# Patient Record
Sex: FEMALE | Race: ASIAN | Employment: FULL TIME | URBAN - METROPOLITAN AREA
[De-identification: names, ages, dates, MRNs, and addresses within clinical notes are randomized per-mention and may not be internally consistent; named-entity substitution may affect disease eponyms.]

---

## 2018-05-09 ENCOUNTER — OFFICE VISIT (OUTPATIENT)
Dept: FAMILY MEDICINE CLINIC | Age: 53
End: 2018-05-09

## 2018-05-09 VITALS
HEIGHT: 63 IN | DIASTOLIC BLOOD PRESSURE: 63 MMHG | WEIGHT: 140.2 LBS | TEMPERATURE: 98.3 F | BODY MASS INDEX: 24.84 KG/M2 | HEART RATE: 67 BPM | RESPIRATION RATE: 16 BRPM | SYSTOLIC BLOOD PRESSURE: 103 MMHG | OXYGEN SATURATION: 100 %

## 2018-05-09 DIAGNOSIS — T73.2XXA FATIGUE DUE TO EXPOSURE, INITIAL ENCOUNTER: ICD-10-CM

## 2018-05-09 DIAGNOSIS — Z12.11 COLON CANCER SCREENING: ICD-10-CM

## 2018-05-09 DIAGNOSIS — O24.410 DIET CONTROLLED GESTATIONAL DIABETES MELLITUS (GDM) IN SECOND TRIMESTER: ICD-10-CM

## 2018-05-09 DIAGNOSIS — E55.9 HYPOVITAMINOSIS D: ICD-10-CM

## 2018-05-09 DIAGNOSIS — Z13.220 SCREENING CHOLESTEROL LEVEL: ICD-10-CM

## 2018-05-09 DIAGNOSIS — Z00.00 ENCOUNTER FOR ANNUAL PHYSICAL EXAM: Primary | ICD-10-CM

## 2018-05-09 DIAGNOSIS — Z84.81 FAMILY HISTORY OF BREAST CANCER GENE MUTATION IN FIRST DEGREE RELATIVE: ICD-10-CM

## 2018-05-09 NOTE — PATIENT INSTRUCTIONS

## 2018-05-09 NOTE — MR AVS SNAPSHOT
[address] [facility] [address] [facility] 
[phone] Patient: Teri Lerma MRN: [id number] :1965 Visit Information Date & Time Provider Department Dept. Phone Encounter #  
 2018 12:00 PM Tim Carrero MD [facility] at [address] [id number] Follow-up Instructions Return in about 1 year (around 2019), or if symptoms worsen or fail to improve, for routine follow up. Upcoming Health Maintenance Date Due COLONOSCOPY 1982 DTaP/Tdap/Td series (1 - Tdap) 1985 PAP AKA CERVICAL CYTOLOGY 1985 Influenza Age 5 to Adult 2018 BREAST CANCER SCRN MAMMOGRAM 2020 Allergies as of 2018  Review Complete On: 2018 By: Tim Carrero MD  
 No Known Allergies Current Immunizations  Reviewed on 2014 No immunizations on file. Not reviewed this visit You Were Diagnosed With   
  
 Codes Comments Encounter for annual physical exam    -  Primary ICD-10-CM: Z00.00 ICD-9-CM: V70.0 Screening cholesterol level     ICD-10-CM: A70.646 ICD-9-CM: V77.91 Fatigue due to exposure, initial encounter     ICD-10-CM: T73. 2XXA ICD-9-CM: 994.4 Diet controlled gestational diabetes mellitus (GDM) in second trimester     ICD-10-CM: O24.410 ICD-9-CM: 892.28 Hypovitaminosis D     ICD-10-CM: E55.9 ICD-9-CM: 268.9 Colon cancer screening     ICD-10-CM: Z12.11 ICD-9-CM: V76.51 Family history of breast cancer gene mutation in first degree relative     ICD-10-CM: Z84.81 ICD-9-CM: V18.9 Vitals BP Pulse Temp Resp Height(growth percentile) Weight(growth percentile) 103/63 (BP 1 Location: Left arm, BP Patient Position: Sitting) 67 98.3 °F (36.8 °C) (Oral) 16 5' 2.75\" (1.594 m) 140 lb 3.2 oz (63.6 kg) SpO2 BMI OB Status Smoking Status 100% 25.03 kg/m2 Having regular periods Never Smoker BMI and BSA Data Body Mass Index Body Surface Area 25.03 kg/m 2 1.68 m 2 Preferred Pharmacy Pharmacy Name Phone [id number] [place], [address] [facility] [phone] Your Updated Medication List  
  
   
This list is accurate as of 5/9/18 12:50 PM.  Always use your most recent med list.  
  
  
  
  
 calcium-vitamin D 500 mg(1,250mg) -200 unit per tablet Commonly known as:  OYSTER SHELL Take 1 Tab by mouth daily. therapeutic multivitamin tablet Commonly known as:  [facility] Take 1 Tab by mouth daily. We Performed the Following BRCA1 TARGETED ANALYSIS [[id number] Custom] BRCA2 TARGETED ANALYSIS [[id number] Custom] CBC WITH AUTOMATED DIFF [44066 CPT(R)] HEMOGLOBIN A1C WITH EAG [51071 CPT(R)] LIPID PANEL [03305 CPT(R)] METABOLIC PANEL, COMPREHENSIVE [90200 CPT(R)] OCCULT BLOOD, IMMUNOASSAY (FIT) [id number] CPT(R)] TSH 3RD GENERATION [83172 CPT(R)] VITAMIN D, 25 HYDROXY [id number] CPT(R)] Follow-up Instructions Return in about 1 year (around 5/9/2019), or if symptoms worsen or fail to improve, for routine follow up. Patient Instructions Well Visit, Ages 25 to 48: Care Instructions Your Care Instructions Physical exams can help you stay healthy. Your doctor has checked your overall health and may have suggested ways to take good care of yourself. He or she also may have recommended tests. At home, you can help prevent illness with healthy eating, regular exercise, and other steps. Follow-up care is a key part of your treatment and safety. Be sure to make and go to all appointments, and call your doctor if you are having problems. It's also a good idea to know your test results and keep a list of the medicines you take. How can you care for yourself at home? · Reach and stay at a healthy weight.  This will lower your risk for many problems, such as obesity, diabetes, heart disease, and high blood pressure. · Get at least 30 minutes of physical activity on most days of the week. Walking is a good choice. You also may want to do other activities, such as running, swimming, cycling, or playing tennis or team sports. Discuss any changes in your exercise program with your doctor. · Do not smoke or allow others to smoke around you. If you need help quitting, talk to your doctor about stop-smoking programs and medicines. These can increase your chances of quitting for good. · Talk to your doctor about whether you have any risk factors for sexually transmitted infections (STIs). Having one sex partner (who does not have STIs and does not have sex with anyone else) is a good way to avoid these infections. · Use birth control if you do not want to have children at this time. Talk with your doctor about the choices available and what might be best for you. · Protect your skin from too much sun. When you're outdoors from 10 a.m. to 4 p.m., stay in the shade or cover up with clothing and a hat with a wide brim. Wear sunglasses that block UV rays. Even when it's cloudy, put broad-spectrum sunscreen (SPF 30 or higher) on any exposed skin. · See a dentist one or two times a year for checkups and to have your teeth cleaned. · Wear a seat belt in the car. · Drink alcohol in moderation, if at all. That means no more than 2 drinks a day for men and 1 drink a day for women. Follow your doctor's advice about when to have certain tests. These tests can spot problems early. For everyone · Cholesterol. Have the fat (cholesterol) in your blood tested after age 21. Your doctor will tell you how often to have this done based on your age, family history, or other things that can increase your risk for heart disease. · Blood pressure.  Have your blood pressure checked during a routine doctor visit. Your doctor will tell you how often to check your blood pressure based on your age, your blood pressure results, and other factors. · Vision. Talk with your doctor about how often to have a glaucoma test. 
· Diabetes. Ask your doctor whether you should have tests for diabetes. · Colon cancer. Have a test for colon cancer at age 48. You may have one of several tests. If you are younger than 48, you may need a test earlier if you have any risk factors. Risk factors include whether you already had a precancerous polyp removed from your colon or whether your parent, brother, sister, or child has had colon cancer. For women · Breast exam and mammogram. Talk to your doctor about when you should have a clinical breast exam and a mammogram. Medical experts differ on whether and how often women under 50 should have these tests. Your doctor can help you decide what is right for you. · Pap test and pelvic exam. Begin Pap tests at age 24. A Pap test is the best way to find cervical cancer. The test often is part of a pelvic exam. Ask how often to have this test. 
· Tests for sexually transmitted infections (STIs). Ask whether you should have tests for STIs. You may be at risk if you have sex with more than one person, especially if your partners do not wear condoms. For men · Tests for sexually transmitted infections (STIs). Ask whether you should have tests for STIs. You may be at risk if you have sex with more than one person, especially if you do not wear a condom. · Testicular cancer exam. Ask your doctor whether you should check your testicles regularly. · Prostate exam. Talk to your doctor about whether you should have a blood test (called a PSA test) for prostate cancer. Experts differ on whether and when men should have this test. Some experts suggest it if you are older than 39 and are -American or have a father or brother who got prostate cancer when he was younger than 72. When should you call for help? Watch closely for changes in your health, and be sure to contact your doctor if you have any problems or symptoms that concern you. Where can you learn more? Go to http://amy-jose.info/. Enter P072 in the search box to learn more about \"Well Visit, Ages 25 to 48: Care Instructions. \" Current as of: May 12, 2017 Content Version: 11.4 © 6223-4228 [employer]. Care instructions adapted under license by [employer] (which disclaims liability or warranty for this information). If you have questions about a medical condition or this instruction, always ask your healthcare professional. [address] any warranty or liability for your use of this information. Well Visit, Ages 25 to 48: Care Instructions Your Care Instructions Physical exams can help you stay healthy. Your doctor has checked your overall health and may have suggested ways to take good care of yourself. He or she also may have recommended tests. At home, you can help prevent illness with healthy eating, regular exercise, and other steps. Follow-up care is a key part of your treatment and safety. Be sure to make and go to all appointments, and call your doctor if you are having problems. It's also a good idea to know your test results and keep a list of the medicines you take. How can you care for yourself at home? · Reach and stay at a healthy weight. This will lower your risk for many problems, such as obesity, diabetes, heart disease, and high blood pressure. · Get at least 30 minutes of physical activity on most days of the week. Walking is a good choice. You also may want to do other activities, such as running, swimming, cycling, or playing tennis or team sports. Discuss any changes in your exercise program with your doctor. · Do not smoke or allow others to smoke around you.  If you need help quitting, talk to your doctor about stop-smoking programs and medicines. These can increase your chances of quitting for good. · Talk to your doctor about whether you have any risk factors for sexually transmitted infections (STIs). Having one sex partner (who does not have STIs and does not have sex with anyone else) is a good way to avoid these infections. · Use birth control if you do not want to have children at this time. Talk with your doctor about the choices available and what might be best for you. · Protect your skin from too much sun. When you're outdoors from 10 a.m. to 4 p.m., stay in the shade or cover up with clothing and a hat with a wide brim. Wear sunglasses that block UV rays. Even when it's cloudy, put broad-spectrum sunscreen (SPF 30 or higher) on any exposed skin. · See a dentist one or two times a year for checkups and to have your teeth cleaned. · Wear a seat belt in the car. · Drink alcohol in moderation, if at all. That means no more than 2 drinks a day for men and 1 drink a day for women. Follow your doctor's advice about when to have certain tests. These tests can spot problems early. For everyone · Cholesterol. Have the fat (cholesterol) in your blood tested after age 21. Your doctor will tell you how often to have this done based on your age, family history, or other things that can increase your risk for heart disease. · Blood pressure. Have your blood pressure checked during a routine doctor visit. Your doctor will tell you how often to check your blood pressure based on your age, your blood pressure results, and other factors. · Vision. Talk with your doctor about how often to have a glaucoma test. 
· Diabetes. Ask your doctor whether you should have tests for diabetes. · Colon cancer. Have a test for colon cancer at age 48. You may have one of several tests.  If you are younger than 48, you may need a test earlier if you have any risk factors. Risk factors include whether you already had a precancerous polyp removed from your colon or whether your parent, brother, sister, or child has had colon cancer. For women · Breast exam and mammogram. Talk to your doctor about when you should have a clinical breast exam and a mammogram. Medical experts differ on whether and how often women under 50 should have these tests. Your doctor can help you decide what is right for you. · Pap test and pelvic exam. Begin Pap tests at age 24. A Pap test is the best way to find cervical cancer. The test often is part of a pelvic exam. Ask how often to have this test. 
· Tests for sexually transmitted infections (STIs). Ask whether you should have tests for STIs. You may be at risk if you have sex with more than one person, especially if your partners do not wear condoms. For men · Tests for sexually transmitted infections (STIs). Ask whether you should have tests for STIs. You may be at risk if you have sex with more than one person, especially if you do not wear a condom. · Testicular cancer exam. Ask your doctor whether you should check your testicles regularly. · Prostate exam. Talk to your doctor about whether you should have a blood test (called a PSA test) for prostate cancer. Experts differ on whether and when men should have this test. Some experts suggest it if you are older than 39 and are -American or have a father or brother who got prostate cancer when he was younger than 72. When should you call for help? Watch closely for changes in your health, and be sure to contact your doctor if you have any problems or symptoms that concern you. Where can you learn more? Go to http://amy-jose.info/. Enter P072 in the search box to learn more about \"Well Visit, Ages 25 to 48: Care Instructions. \" Current as of: May 12, 2017 Content Version: 11.4 © 9537-2594 [employer]. Care instructions adapted under license by [employer] (which disclaims liability or warranty for this information). If you have questions about a medical condition or this instruction, always ask your healthcare professional. [address] any warranty or liability for your use of this information. Introducing [facility]! Eddie Rico introduces NICE patient portal. Now you can access parts of your medical record, email your doctor's office, and request medication refills online. 1. In your internet browser, go to [email or web address] 2. Click on the First Time User? Click Here link in the Sign In box. You will see the New Member Sign Up page. 3. Enter your NICE Access Code exactly as it appears below. You will not need to use this code after youve completed the sign-up process. If you do not sign up before the expiration date, you must request a new code. · NICE Access Code: [email or web address] Expires: 8/7/2018 12:49 PM 
 
4. Enter the last four digits of your Social Security Number (xxxx) and Date of Birth (mm/dd/yyyy) as indicated and click Submit. You will be taken to the next sign-up page. 5. Create a NICE ID. This will be your NICE login ID and cannot be changed, so think of one that is secure and easy to remember. 6. Create a NICE password. You can change your password at any time. 7. Enter your Password Reset Question and Answer. This can be used at a later time if you forget your password. 8. Enter your e-mail address. You will receive e-mail notification when new information is available in [address] 9. Click Sign Up. You can now view and download portions of your medical record. 10. Click the Download Summary menu link to download a portable copy of your medical information.  
 
If you have questions, please visit the Frequently Asked Questions section of the GÃ¼dpod. Remember, SEE Forgehart is NOT to be used for urgent needs. For medical emergencies, dial 911. Now available from your iPhone and Android! Please provide this summary of care documentation to your next provider. Your primary care clinician is listed as Peri Marshall. If you have any questions after today's visit, please call [phone].

## 2018-05-09 NOTE — PROGRESS NOTES
Chief Complaint   Patient presents with   [address] Patient (previous PCP: Marisabel Aragon)

## 2018-05-14 NOTE — PROGRESS NOTES
Chief Complaint   Patient presents with   [address] Patient (previous PCP: Katt Mccord)     Subjective:  Jordan García is a 46 y.o. female presents to [facility] care for annual medical exam.   Her gyne and breast care is done elsewhere by her Ob-Gyne physician. Current Outpatient Prescriptions   Medication Sig Dispense Refill    therapeutic multivitamin (THERAGRAN) tablet Take 1 Tab by mouth daily.  calcium-vitamin D (OYSTER SHELL) 500 mg(1,250mg) -200 unit per tablet Take 1 Tab by mouth daily.        No Known Allergies  Past Medical History:   Diagnosis Date    Ventral hernia 2014     Past Surgical History:   Procedure Laterality Date    HX  SECTION      HX GYN      fibroid sx    HX GYN      c section    HX MYOMECTOMY   &      Family History   Problem Relation Age of Onset    Hypertension Mother     Diabetes Father     Cancer Sister      Breast Cancer     Social History   Substance Use Topics    Smoking status: Never Smoker    Smokeless tobacco: Never Used    Alcohol use No        Lab Results  Component Value Date/Time   WBC 8.4 2014 02:06 AM   HGB 12.3 2014 02:06 AM   HCT 38.1 2014 02:06 AM   PLATELET 915  02:06 AM   MCV 90.9 2014 02:06 AM     No results found for: CHOL, CHOLPOCT, HDL, LDL, LDLC, LDLCPOC, LDLCEXT, TRIGL, TGLPOCT, CHHD, CHHDX  No results found for: TSH, TSH2, TSH3, TSHP, TSHELE, TSHEXT, TT3, T3U, T3UP, FRT3, FT3, FT4, FT4P, T4, T4P, FT4T, TT7, TSHEXT   Lab Results   Component Value Date/Time    Sodium 137 05/15/2014 12:43 PM    Potassium 3.9 05/15/2014 12:43 PM    Chloride 104 05/15/2014 12:43 PM    CO2 29 05/15/2014 12:43 PM    Anion gap 4 (L) 05/15/2014 12:43 PM    Glucose 79 05/15/2014 12:43 PM    BUN 9 05/15/2014 12:43 PM    Creatinine 0.53 05/15/2014 12:43 PM    BUN/Creatinine ratio 17 05/15/2014 12:43 PM    GFR est AA >60 05/15/2014 12:43 PM    GFR est non-AA >60 05/15/2014 12:43 PM    Calcium 9.3 05/15/2014 12:43 PM    Bilirubin, total 0.8 05/15/2014 12:43 PM    ALT (SGPT) 49 05/15/2014 12:43 PM    AST (SGOT) 30 05/15/2014 12:43 PM    Alk. phosphatase 99 05/15/2014 12:43 PM    Protein, total 7.6 05/15/2014 12:43 PM    Albumin 3.7 05/15/2014 12:43 PM    Globulin 3.9 05/15/2014 12:43 PM    A-G Ratio 0.9 (L) 05/15/2014 12:43 PM      ROS:   Feeling generally well  No unusual headaches, no dysphagia  No prolonged cough  No dyspnea or chest pain on exertion  No abdominal pain, change in bowel habits, black or bloody stools  No urinary tract symptoms  No new or unusual musculoskeletal symptoms. Specific concerns today: Significant firstt degree family history of     Objective:  Blood pressure 103/63, pulse 67, temperature 98.3 °F (36.8 °C), temperature source Oral, resp. rate 16, height 5' 2.75\" (1.594 m), weight 140 lb 3.2 oz (63.6 kg), SpO2 100 %. Patient appears well, alert, oriented x 3, in no distress. ENT normal.  Neck supple. No adenopathy or thyromegaly, PERRRL. Lungs are clear, good air entry, no wheezes, rhonchi or rales  CVS:  S1 and S2 normal, no murmurs, regular rate and rhythm  Abdomen soft without tenderness, guarding, mass or organomegaly  Extremities show no edema, normal peripheral pulses  Neurological is normal, no focal findings. Breast and Pelvic exams are deferred.     Assessment/Plan:  Diagnoses and all orders for this visit:    Encounter for annual physical exam  -     METABOLIC PANEL, COMPREHENSIVE  -     CBC WITH AUTOMATED DIFF    Screening cholesterol level  -     LIPID PANEL    Fatigue due to exposure, initial encounter  -     TSH 3RD GENERATION    Diet controlled gestational diabetes mellitus (GDM) in second trimester  -     HEMOGLOBIN A1C WITH EAG    Hypovitaminosis D  -     VITAMIN D, 25 HYDROXY    Colon cancer screening  -     OCCULT BLOOD, IMMUNOASSAY (FIT)    Family history of breast cancer gene mutation in first degree relative  -     BRCA1 TARGETED ANALYSIS  - BRCA2 TARGETED ANALYSIS      Patient Instructions        Well Visit, Ages 25 to 48: Care Instructions  Your Care Instructions    Physical exams can help you stay healthy. Your doctor has checked your overall health and may have suggested ways to take good care of yourself. He or she also may have recommended tests. At home, you can help prevent illness with healthy eating, regular exercise, and other steps. Follow-up care is a key part of your treatment and safety. Be sure to make and go to all appointments, and call your doctor if you are having problems. It's also a good idea to know your test results and keep a list of the medicines you take. How can you care for yourself at home? · Reach and stay at a healthy weight. This will lower your risk for many problems, such as obesity, diabetes, heart disease, and high blood pressure. · Get at least 30 minutes of physical activity on most days of the week. Walking is a good choice. You also may want to do other activities, such as running, swimming, cycling, or playing tennis or team sports. Discuss any changes in your exercise program with your doctor. · Do not smoke or allow others to smoke around you. If you need help quitting, talk to your doctor about stop-smoking programs and medicines. These can increase your chances of quitting for good. · Talk to your doctor about whether you have any risk factors for sexually transmitted infections (STIs). Having one sex partner (who does not have STIs and does not have sex with anyone else) is a good way to avoid these infections. · Use birth control if you do not want to have children at this time. Talk with your doctor about the choices available and what might be best for you. · Protect your skin from too much sun. When you're outdoors from 10 a.m. to 4 p.m., stay in the shade or cover up with clothing and a hat with a wide brim. Wear sunglasses that block UV rays.  Even when it's cloudy, put broad-spectrum sunscreen (SPF 30 or higher) on any exposed skin. · See a dentist one or two times a year for checkups and to have your teeth cleaned. · Wear a seat belt in the car. · Drink alcohol in moderation, if at all. That means no more than 2 drinks a day for men and 1 drink a day for women. Follow your doctor's advice about when to have certain tests. These tests can spot problems early. For everyone  · Cholesterol. Have the fat (cholesterol) in your blood tested after age 21. Your doctor will tell you how often to have this done based on your age, family history, or other things that can increase your risk for heart disease. · Blood pressure. Have your blood pressure checked during a routine doctor visit. Your doctor will tell you how often to check your blood pressure based on your age, your blood pressure results, and other factors. · Vision. Talk with your doctor about how often to have a glaucoma test.  · Diabetes. Ask your doctor whether you should have tests for diabetes. · Colon cancer. Have a test for colon cancer at age 48. You may have one of several tests. If you are younger than 48, you may need a test earlier if you have any risk factors. Risk factors include whether you already had a precancerous polyp removed from your colon or whether your parent, brother, sister, or child has had colon cancer. For women  · Breast exam and mammogram. Talk to your doctor about when you should have a clinical breast exam and a mammogram. Medical experts differ on whether and how often women under 50 should have these tests. Your doctor can help you decide what is right for you. · Pap test and pelvic exam. Begin Pap tests at age 24. A Pap test is the best way to find cervical cancer. The test often is part of a pelvic exam. Ask how often to have this test.  · Tests for sexually transmitted infections (STIs). Ask whether you should have tests for STIs.  You may be at risk if you have sex with more than one person, especially if your partners do not wear condoms. For men  · Tests for sexually transmitted infections (STIs). Ask whether you should have tests for STIs. You may be at risk if you have sex with more than one person, especially if you do not wear a condom. · Testicular cancer exam. Ask your doctor whether you should check your testicles regularly. · Prostate exam. Talk to your doctor about whether you should have a blood test (called a PSA test) for prostate cancer. Experts differ on whether and when men should have this test. Some experts suggest it if you are older than 39 and are -American or have a father or brother who got prostate cancer when he was younger than 72. When should you call for help? Watch closely for changes in your health, and be sure to contact your doctor if you have any problems or symptoms that concern you. Where can you learn more? Go to http://amy-jose.info/. Enter P072 in the search box to learn more about \"Well Visit, Ages 25 to 48: Care Instructions. \"  Current as of: May 12, 2017  Content Version: 11.4  © 1702-8712 [employer]. Care instructions adapted under license by [employer] (which disclaims liability or warranty for this information). If you have questions about a medical condition or this instruction, always ask your healthcare professional. [address] any warranty or liability for your use of this information. Well Visit, Ages 25 to 48: Care Instructions  Your Care Instructions    Physical exams can help you stay healthy. Your doctor has checked your overall health and may have suggested ways to take good care of yourself. He or she also may have recommended tests. At home, you can help prevent illness with healthy eating, regular exercise, and other steps. Follow-up care is a key part of your treatment and safety.  Be sure to make and go to all appointments, and call your doctor if you are having problems. It's also a good idea to know your test results and keep a list of the medicines you take. How can you care for yourself at home? · Reach and stay at a healthy weight. This will lower your risk for many problems, such as obesity, diabetes, heart disease, and high blood pressure. · Get at least 30 minutes of physical activity on most days of the week. Walking is a good choice. You also may want to do other activities, such as running, swimming, cycling, or playing tennis or team sports. Discuss any changes in your exercise program with your doctor. · Do not smoke or allow others to smoke around you. If you need help quitting, talk to your doctor about stop-smoking programs and medicines. These can increase your chances of quitting for good. · Talk to your doctor about whether you have any risk factors for sexually transmitted infections (STIs). Having one sex partner (who does not have STIs and does not have sex with anyone else) is a good way to avoid these infections. · Use birth control if you do not want to have children at this time. Talk with your doctor about the choices available and what might be best for you. · Protect your skin from too much sun. When you're outdoors from 10 a.m. to 4 p.m., stay in the shade or cover up with clothing and a hat with a wide brim. Wear sunglasses that block UV rays. Even when it's cloudy, put broad-spectrum sunscreen (SPF 30 or higher) on any exposed skin. · See a dentist one or two times a year for checkups and to have your teeth cleaned. · Wear a seat belt in the car. · Drink alcohol in moderation, if at all. That means no more than 2 drinks a day for men and 1 drink a day for women. Follow your doctor's advice about when to have certain tests. These tests can spot problems early. For everyone  · Cholesterol. Have the fat (cholesterol) in your blood tested after age 21.  Your doctor will tell you how often to have this done based on your age, family history, or other things that can increase your risk for heart disease. · Blood pressure. Have your blood pressure checked during a routine doctor visit. Your doctor will tell you how often to check your blood pressure based on your age, your blood pressure results, and other factors. · Vision. Talk with your doctor about how often to have a glaucoma test.  · Diabetes. Ask your doctor whether you should have tests for diabetes. · Colon cancer. Have a test for colon cancer at age 48. You may have one of several tests. If you are younger than 48, you may need a test earlier if you have any risk factors. Risk factors include whether you already had a precancerous polyp removed from your colon or whether your parent, brother, sister, or child has had colon cancer. For women  · Breast exam and mammogram. Talk to your doctor about when you should have a clinical breast exam and a mammogram. Medical experts differ on whether and how often women under 50 should have these tests. Your doctor can help you decide what is right for you. · Pap test and pelvic exam. Begin Pap tests at age 24. A Pap test is the best way to find cervical cancer. The test often is part of a pelvic exam. Ask how often to have this test.  · Tests for sexually transmitted infections (STIs). Ask whether you should have tests for STIs. You may be at risk if you have sex with more than one person, especially if your partners do not wear condoms. For men  · Tests for sexually transmitted infections (STIs). Ask whether you should have tests for STIs. You may be at risk if you have sex with more than one person, especially if you do not wear a condom. · Testicular cancer exam. Ask your doctor whether you should check your testicles regularly. · Prostate exam. Talk to your doctor about whether you should have a blood test (called a PSA test) for prostate cancer.  Experts differ on whether and when men should have this test. Some experts suggest it if you are older than 39 and are -American or have a father or brother who got prostate cancer when he was younger than 72. When should you call for help? Watch closely for changes in your health, and be sure to contact your doctor if you have any problems or symptoms that concern you. Where can you learn more? Go to http://amy-jose.info/. Enter P072 in the search box to learn more about \"Well Visit, Ages 25 to 48: Care Instructions. \"  Current as of: May 12, 2017  Content Version: 11.4  © 9059-8501 [employer]. Care instructions adapted under license by [employer] (which disclaims liability or warranty for this information). If you have questions about a medical condition or this instruction, always ask your healthcare professional. [address] any warranty or liability for your use of this information. Follow-up Disposition:  Return in about 1 year (around 5/9/2019), or if symptoms worsen or fail to improve, for routine follow up.

## 2018-07-03 DIAGNOSIS — B36.0 TINEA VERSICOLOR: Primary | ICD-10-CM

## 2018-07-03 RX ORDER — CLOTRIMAZOLE AND BETAMETHASONE DIPROPIONATE 10; .5 MG/ML; MG/ML
LOTION TOPICAL 2 TIMES DAILY
Qty: 30 ML | Refills: 1 | Status: SHIPPED | OUTPATIENT
Start: 2018-07-03 | End: 2019-11-06

## 2019-01-16 DIAGNOSIS — J20.9 ACUTE BRONCHITIS DUE TO INFECTION: Primary | ICD-10-CM

## 2019-01-16 RX ORDER — AMOXICILLIN 500 MG/1
500 CAPSULE ORAL 2 TIMES DAILY
Qty: 20 CAP | Refills: 0 | Status: SHIPPED | OUTPATIENT
Start: 2019-01-16 | End: 2019-01-26

## 2019-02-16 DIAGNOSIS — J20.9 ACUTE BRONCHITIS DUE TO INFECTION: Primary | ICD-10-CM

## 2019-02-16 RX ORDER — AMOXICILLIN 500 MG/1
500 CAPSULE ORAL 2 TIMES DAILY
Qty: 28 CAP | Refills: 0 | Status: SHIPPED | OUTPATIENT
Start: 2019-02-16 | End: 2019-11-10 | Stop reason: SDUPTHER

## 2019-04-30 DIAGNOSIS — M25.552 PAIN OF BOTH HIP JOINTS: Primary | ICD-10-CM

## 2019-04-30 DIAGNOSIS — M25.551 PAIN OF BOTH HIP JOINTS: Primary | ICD-10-CM

## 2019-06-26 DIAGNOSIS — J02.0 STREPTOCOCCAL PHARYNGITIS: Primary | ICD-10-CM

## 2019-06-26 RX ORDER — AZITHROMYCIN 250 MG/1
TABLET, FILM COATED ORAL
Qty: 8 TAB | Refills: 0 | Status: SHIPPED | OUTPATIENT
Start: 2019-06-26 | End: 2019-06-26 | Stop reason: SDUPTHER

## 2019-06-26 RX ORDER — AZITHROMYCIN 250 MG/1
TABLET, FILM COATED ORAL
Qty: 6 TAB | Refills: 0 | Status: SHIPPED | OUTPATIENT
Start: 2019-06-26 | End: 2019-07-01

## 2019-11-06 ENCOUNTER — OFFICE VISIT (OUTPATIENT)
Dept: FAMILY MEDICINE CLINIC | Age: 54
End: 2019-11-06

## 2019-11-06 VITALS
HEIGHT: 64 IN | BODY MASS INDEX: 24.41 KG/M2 | RESPIRATION RATE: 20 BRPM | HEART RATE: 61 BPM | DIASTOLIC BLOOD PRESSURE: 61 MMHG | OXYGEN SATURATION: 99 % | WEIGHT: 143 LBS | SYSTOLIC BLOOD PRESSURE: 107 MMHG | TEMPERATURE: 97 F

## 2019-11-06 DIAGNOSIS — Z00.00 ENCOUNTER FOR ANNUAL PHYSICAL EXAM: ICD-10-CM

## 2019-11-06 DIAGNOSIS — Z13.220 SCREENING CHOLESTEROL LEVEL: ICD-10-CM

## 2019-11-06 DIAGNOSIS — Z13.29 SCREENING FOR THYROID DISORDER: ICD-10-CM

## 2019-11-06 DIAGNOSIS — E55.9 VITAMIN D DEFICIENCY: ICD-10-CM

## 2019-11-06 DIAGNOSIS — Z86.32 H/O GESTATIONAL DIABETES MELLITUS, NOT CURRENTLY PREGNANT: ICD-10-CM

## 2019-11-06 DIAGNOSIS — T73.3XXS FATIGUE DUE TO EXCESSIVE EXERTION, SEQUELA: ICD-10-CM

## 2019-11-06 DIAGNOSIS — Z00.00 ENCOUNTER FOR ANNUAL PHYSICAL EXAM: Primary | ICD-10-CM

## 2019-11-06 NOTE — PATIENT INSTRUCTIONS
Well Visit, Women 48 to 72: Care Instructions  Your Care Instructions    Physical exams can help you stay healthy. Your doctor has checked your overall health and may have suggested ways to take good care of yourself. He or she also may have recommended tests. At home, you can help prevent illness with healthy eating, regular exercise, and other steps. Follow-up care is a key part of your treatment and safety. Be sure to make and go to all appointments, and call your doctor if you are having problems. It's also a good idea to know your test results and keep a list of the medicines you take. How can you care for yourself at home? · Reach and stay at a healthy weight. This will lower your risk for many problems, such as obesity, diabetes, heart disease, and high blood pressure. · Get at least 30 minutes of exercise on most days of the week. Walking is a good choice. You also may want to do other activities, such as running, swimming, cycling, or playing tennis or team sports. · Do not smoke. Smoking can make health problems worse. If you need help quitting, talk to your doctor about stop-smoking programs and medicines. These can increase your chances of quitting for good. · Protect your skin from too much sun. When you're outdoors from 10 a.m. to 4 p.m., stay in the shade or cover up with clothing and a hat with a wide brim. Wear sunglasses that block UV rays. Even when it's cloudy, put broad-spectrum sunscreen (SPF 30 or higher) on any exposed skin. · See a dentist one or two times a year for checkups and to have your teeth cleaned. · Wear a seat belt in the car. Follow your doctor's advice about when to have certain tests. These tests can spot problems early. · Cholesterol. Your doctor will tell you how often to have this done based on your age, family history, or other things that can increase your risk for heart attack and stroke. · Blood pressure.  Have your blood pressure checked during a routine doctor visit. Your doctor will tell you how often to check your blood pressure based on your age, your blood pressure results, and other factors. · Mammogram. Ask your doctor how often you should have a mammogram, which is an X-ray of your breasts. A mammogram can spot breast cancer before it can be felt and when it is easiest to treat. · Pap test and pelvic exam. Ask your doctor how often you should have a Pap test. You may not need to have a Pap test as often as you used to. · Vision. Have your eyes checked every year or two or as often as your doctor suggests. Some experts recommend that you have yearly exams for glaucoma and other age-related eye problems starting at age 48. · Hearing. Tell your doctor if you notice any change in your hearing. You can have tests to find out how well you hear. · Diabetes. Ask your doctor whether you should have tests for diabetes. · Colorectal cancer. Your risk for colorectal cancer gets higher as you get older. Some experts say that adults should start regular screening at age 48 and stop at age 76. Others say to start before age 48 or continue after age 76. Talk with your doctor about your risk and when to start and stop screening. · Thyroid disease. Talk to your doctor about whether to have your thyroid checked as part of a regular physical exam. Women have an increased chance of a thyroid problem. · Osteoporosis. You should begin tests for bone density at age 72. If you are younger than 72, ask your doctor whether you have factors that may increase your risk for this disease. You may want to have this test before age 72. · Heart attack and stroke risk. At least every 4 to 6 years, you should have your risk for heart attack and stroke assessed. Your doctor uses factors such as your age, blood pressure, cholesterol, and whether you smoke or have diabetes to show what your risk for a heart attack or stroke is over the next 10 years.   When should you call for help?  Watch closely for changes in your health, and be sure to contact your doctor if you have any problems or symptoms that concern you. Where can you learn more? Go to http://amy-jose.info/. Enter R845 in the search box to learn more about \"Well Visit, Women 50 to 72: Care Instructions. \"  Current as of: December 13, 2018  Content Version: 12.2  © 3348-2948 [employer]. Care instructions adapted under license by [employer] (which disclaims liability or warranty for this information). If you have questions about a medical condition or this instruction, always ask your healthcare professional. [address] any warranty or liability for your use of this information.

## 2019-11-06 NOTE — PROGRESS NOTES
Chief Complaint   Patient presents with    Complete Physical     HPI:  Jennifer Lorenzo is a 53 y.o. female with no significant medical history presents for annual physical .  Patient has been doing well. Blood pressure is at goal. Her only medication is multivitamin. She has no complaints. Review of Systems  Constitutional: negative for fevers/chills  Eyes:   negative for visual disturbance   Respiratory:  negative for cough, dyspnea,wheezing  CV:   negative for chest pain, palpitations, lower extremity edema  GI:   negative for abdominal pain  Endo:             negative for polyuria,polydipsia,polyphagia,heat intolerance  Genitourinary: negative for frequency  Integument:  negative for rash and pruritus  Musculoskel: negative for myalgias, arthralgias, back pain, joint pain  Neurological:  negative for headaches, dizziness  Behavl/Psych: negative for feelings of anxiety, depression, mood changes    Past Medical History:   Diagnosis Date    Ventral hernia 2014     Past Surgical History:   Procedure Laterality Date    HX  SECTION  2012    HX GYN      fibroid sx    HX GYN      c section    HX MYOMECTOMY   &      Social History     Socioeconomic History    Marital status:      Spouse name: Not on file    Number of children: Not on file    Years of education: Not on file    Highest education level: Not on file   Tobacco Use    Smoking status: Never Smoker    Smokeless tobacco: Never Used   Substance and Sexual Activity    Alcohol use: No    Drug use: No    Sexual activity: Yes     Partners: Male     Family History   Problem Relation Age of Onset    Hypertension Mother     Diabetes Father     Cancer Sister         Breast Cancer     Current Outpatient Medications   Medication Sig Dispense Refill    calcium-vitamin D (OYSTER SHELL) 500 mg(1,250mg) -200 unit per tablet Take 1 Tab by mouth daily.        No Known Allergies    Objective:  Visit Vitals  /61   Pulse 61 Temp 97 °F (36.1 °C) (Oral)   Resp 20   Ht 5' 3.5\" (1.613 m)   Wt 143 lb (64.9 kg)   SpO2 99%   BMI 24.93 kg/m²     Physical Exam:   General appearance - alert, well appearing in no distress  Mental status - alert, oriented to person, place, and time  EYE-PERRL, EOMI  ENT-ENT exam normal, no neck nodes or sinus tenderness  Neck - supple, no significant adenopathy   Chest - clear to auscultation, no wheezes, rales or rhonchi  Heart - normal rate, regular rhythm, normal blood pressre  Abdomen - soft, nontender, nondistended, no organomegaly  Ext-peripheral pulses normal, no pedal edema  Neuro -alert, oriented, normal speech, no focal findings  Back-full range of motion, no tenderness, palpable spasm or pain on motion     Results for orders placed or performed during the hospital encounter of 05/15/14   CBC WITH AUTOMATED DIFF   Result Value Ref Range    WBC 8.2 3.6 - 11.0 K/uL    RBC 4.34 3.80 - 5.20 M/uL    HGB 13.4 11.5 - 16.0 g/dL    HCT 38.3 35.0 - 47.0 %    MCV 88.2 80.0 - 99.0 FL    MCH 30.9 26.0 - 34.0 PG    MCHC 35.0 30.0 - 36.5 g/dL    RDW 12.6 11.5 - 14.5 %    PLATELET 928 624 - 799 K/uL    NEUTROPHILS 85 (H) 32 - 75 %    LYMPHOCYTES 9 (L) 12 - 49 %    MONOCYTES 6 5 - 13 %    EOSINOPHILS 0 0 - 7 %    BASOPHILS 0 0 - 1 %    ABS. NEUTROPHILS 7.0 1.8 - 8.0 K/UL    ABS. LYMPHOCYTES 0.7 (L) 0.8 - 3.5 K/UL    ABS. MONOCYTES 0.5 0.0 - 1.0 K/UL    ABS. EOSINOPHILS 0.0 0.0 - 0.4 K/UL    ABS.  BASOPHILS 0.0 0.0 - 0.1 K/UL    DF SMEAR SCANNED     RBC COMMENTS NORMOCYTIC, NORMOCHROMIC    METABOLIC PANEL, COMPREHENSIVE   Result Value Ref Range    Sodium 137 136 - 145 mmol/L    Potassium 3.9 3.5 - 5.1 mmol/L    Chloride 104 97 - 108 mmol/L    CO2 29 21 - 32 mmol/L    Anion gap 4 (L) 5 - 15 mmol/L    Glucose 79 65 - 100 mg/dL    BUN 9 6 - 20 MG/DL    Creatinine 0.53 0.45 - 1.15 MG/DL    BUN/Creatinine ratio 17 12 - 20      GFR est AA >60 >60 ml/min/1.73m2    GFR est non-AA >60 >60 ml/min/1.73m2    Calcium 9.3 8.5 - 10.1 MG/DL    Bilirubin, total 0.8 0.2 - 1.0 MG/DL    ALT (SGPT) 49 12 - 78 U/L    AST (SGOT) 30 15 - 37 U/L    Alk. phosphatase 99 50 - 136 U/L    Protein, total 7.6 6.4 - 8.2 g/dL    Albumin 3.7 3.5 - 5.0 g/dL    Globulin 3.9 2.0 - 4.0 g/dL    A-G Ratio 0.9 (L) 1.1 - 2.2     URINALYSIS W/ REFLEX CULTURE   Result Value Ref Range    Color YELLOW/STRAW     Appearance CLEAR CLEAR    Specific gravity 1.007 1.003 - 1.030      pH (UA) 6.0 5.0 - 8.0      Protein NEGATIVE  NEGATIVE mg/dL    Glucose NEGATIVE  NEGATIVE mg/dL    Ketone NEGATIVE  NEGATIVE mg/dL    Bilirubin NEGATIVE  NEGATIVE    Blood SMALL (A) NEGATIVE    Urobilinogen 0.2 0.2 - 1.0 EU/dL    Nitrites NEGATIVE  NEGATIVE    Leukocyte Esterase NEGATIVE  NEGATIVE    UA:UC IF INDICATED CULTURE NOT INDICATED BY UA RESULT CULTURE NOT INDICATE    WBC 0-4 0 - 4 /hpf    RBC 0-5 0 - 5 /hpf    Epithelial cells FEW FEW /lpf    Bacteria NEGATIVE  NEGATIVE /hpf    Hyaline cast 0-2 0 - 2   CBC W/O DIFF   Result Value Ref Range    WBC 8.4 3.6 - 11.0 K/uL    RBC 4.19 3.80 - 5.20 M/uL    HGB 12.3 11.5 - 16.0 g/dL    HCT 38.1 35.0 - 47.0 %    MCV 90.9 80.0 - 99.0 FL    MCH 29.4 26.0 - 34.0 PG    MCHC 32.3 30.0 - 36.5 g/dL    RDW 12.9 11.5 - 14.5 %    PLATELET 484 689 - 186 K/uL   HCG URINE, QL. - POC   Result Value Ref Range    Pregnancy test,urine (POC) NEGATIVE  NEGATIVE     Assessment/Plan:  Diagnoses and all orders for this visit:    Encounter for annual physical exam  -     METABOLIC PANEL, COMPREHENSIVE; Future  -     CBC W/O DIFF; Future    Vitamin D deficiency  -     VITAMIN D, 25 HYDROXY; Future    Fatigue due to excessive exertion, sequela  -     CBC W/O DIFF; Future    Screening for thyroid disorder  -     TSH 3RD GENERATION; Future    Screening cholesterol level  -     LIPID PANEL; Future    H/O gestational diabetes mellitus, not currently pregnant  -     HEMOGLOBIN A1C WITH EAG;  Future      Patient Instructions        Well Visit, Women 48 to 72: Care Instructions  Your Care Instructions    Physical exams can help you stay healthy. Your doctor has checked your overall health and may have suggested ways to take good care of yourself. He or she also may have recommended tests. At home, you can help prevent illness with healthy eating, regular exercise, and other steps. Follow-up care is a key part of your treatment and safety. Be sure to make and go to all appointments, and call your doctor if you are having problems. It's also a good idea to know your test results and keep a list of the medicines you take. How can you care for yourself at home? · Reach and stay at a healthy weight. This will lower your risk for many problems, such as obesity, diabetes, heart disease, and high blood pressure. · Get at least 30 minutes of exercise on most days of the week. Walking is a good choice. You also may want to do other activities, such as running, swimming, cycling, or playing tennis or team sports. · Do not smoke. Smoking can make health problems worse. If you need help quitting, talk to your doctor about stop-smoking programs and medicines. These can increase your chances of quitting for good. · Protect your skin from too much sun. When you're outdoors from 10 a.m. to 4 p.m., stay in the shade or cover up with clothing and a hat with a wide brim. Wear sunglasses that block UV rays. Even when it's cloudy, put broad-spectrum sunscreen (SPF 30 or higher) on any exposed skin. · See a dentist one or two times a year for checkups and to have your teeth cleaned. · Wear a seat belt in the car. Follow your doctor's advice about when to have certain tests. These tests can spot problems early. · Cholesterol. Your doctor will tell you how often to have this done based on your age, family history, or other things that can increase your risk for heart attack and stroke. · Blood pressure. Have your blood pressure checked during a routine doctor visit.  Your doctor will tell you how often to check your blood pressure based on your age, your blood pressure results, and other factors. · Mammogram. Ask your doctor how often you should have a mammogram, which is an X-ray of your breasts. A mammogram can spot breast cancer before it can be felt and when it is easiest to treat. · Pap test and pelvic exam. Ask your doctor how often you should have a Pap test. You may not need to have a Pap test as often as you used to. · Vision. Have your eyes checked every year or two or as often as your doctor suggests. Some experts recommend that you have yearly exams for glaucoma and other age-related eye problems starting at age 48. · Hearing. Tell your doctor if you notice any change in your hearing. You can have tests to find out how well you hear. · Diabetes. Ask your doctor whether you should have tests for diabetes. · Colorectal cancer. Your risk for colorectal cancer gets higher as you get older. Some experts say that adults should start regular screening at age 48 and stop at age 76. Others say to start before age 48 or continue after age 76. Talk with your doctor about your risk and when to start and stop screening. · Thyroid disease. Talk to your doctor about whether to have your thyroid checked as part of a regular physical exam. Women have an increased chance of a thyroid problem. · Osteoporosis. You should begin tests for bone density at age 72. If you are younger than 72, ask your doctor whether you have factors that may increase your risk for this disease. You may want to have this test before age 72. · Heart attack and stroke risk. At least every 4 to 6 years, you should have your risk for heart attack and stroke assessed. Your doctor uses factors such as your age, blood pressure, cholesterol, and whether you smoke or have diabetes to show what your risk for a heart attack or stroke is over the next 10 years. When should you call for help?   Watch closely for changes in your health, and be sure to contact your doctor if you have any problems or symptoms that concern you. Where can you learn more? Go to http://amy-jose.info/. Enter Z557 in the search box to learn more about \"Well Visit, Women 50 to 72: Care Instructions. \"  Current as of: December 13, 2018  Content Version: 12.2  © 3761-5300 [employer]. Care instructions adapted under license by [employer] (which disclaims liability or warranty for this information). If you have questions about a medical condition or this instruction, always ask your healthcare professional. [address] any warranty or liability for your use of this information. Follow-up and Dispositions    · Return in about 1 year (around 11/6/2020), or if symptoms worsen or fail to improve, for routine follow up.

## 2019-11-07 LAB
25(OH)D3+25(OH)D2 SERPL-MCNC: 45.7 NG/ML (ref 30–100)
ALBUMIN SERPL-MCNC: 4.6 G/DL (ref 3.5–5.5)
ALBUMIN/GLOB SERPL: 2.3 {RATIO} (ref 1.2–2.2)
ALP SERPL-CCNC: 89 IU/L (ref 39–117)
ALT SERPL-CCNC: 19 IU/L (ref 0–32)
AST SERPL-CCNC: 17 IU/L (ref 0–40)
BILIRUB SERPL-MCNC: 0.4 MG/DL (ref 0–1.2)
BUN SERPL-MCNC: 8 MG/DL (ref 6–24)
BUN/CREAT SERPL: 11 (ref 9–23)
CALCIUM SERPL-MCNC: 9.9 MG/DL (ref 8.7–10.2)
CHLORIDE SERPL-SCNC: 102 MMOL/L (ref 96–106)
CHOLEST SERPL-MCNC: 169 MG/DL (ref 100–199)
CO2 SERPL-SCNC: 27 MMOL/L (ref 20–29)
CREAT SERPL-MCNC: 0.7 MG/DL (ref 0.57–1)
ERYTHROCYTE [DISTWIDTH] IN BLOOD BY AUTOMATED COUNT: 12.7 % (ref 12.3–15.4)
EST. AVERAGE GLUCOSE BLD GHB EST-MCNC: 103 MG/DL
GLOBULIN SER CALC-MCNC: 2 G/DL (ref 1.5–4.5)
GLUCOSE SERPL-MCNC: 90 MG/DL (ref 65–99)
HBA1C MFR BLD: 5.2 % (ref 4.8–5.6)
HCT VFR BLD AUTO: 40 % (ref 34–46.6)
HDLC SERPL-MCNC: 70 MG/DL
HGB BLD-MCNC: 13.6 G/DL (ref 11.1–15.9)
LDLC SERPL CALC-MCNC: 88 MG/DL (ref 0–99)
MCH RBC QN AUTO: 29.5 PG (ref 26.6–33)
MCHC RBC AUTO-ENTMCNC: 34 G/DL (ref 31.5–35.7)
MCV RBC AUTO: 87 FL (ref 79–97)
PLATELET # BLD AUTO: 194 X10E3/UL (ref 150–450)
POTASSIUM SERPL-SCNC: 4.1 MMOL/L (ref 3.5–5.2)
PROT SERPL-MCNC: 6.6 G/DL (ref 6–8.5)
RBC # BLD AUTO: 4.61 X10E6/UL (ref 3.77–5.28)
SODIUM SERPL-SCNC: 143 MMOL/L (ref 134–144)
TRIGL SERPL-MCNC: 54 MG/DL (ref 0–149)
TSH SERPL DL<=0.005 MIU/L-ACNC: 1.66 UIU/ML (ref 0.45–4.5)
VLDLC SERPL CALC-MCNC: 11 MG/DL (ref 5–40)
WBC # BLD AUTO: 3.6 X10E3/UL (ref 3.4–10.8)

## 2019-11-10 DIAGNOSIS — J20.9 ACUTE BRONCHITIS DUE TO INFECTION: ICD-10-CM

## 2019-11-10 RX ORDER — AMOXICILLIN 500 MG/1
500 CAPSULE ORAL 2 TIMES DAILY
Qty: 28 CAP | Refills: 0 | Status: SHIPPED | OUTPATIENT
Start: 2019-11-10 | End: 2019-11-24

## 2020-02-07 DIAGNOSIS — L08.9 SKIN INFECTION: ICD-10-CM

## 2020-02-07 DIAGNOSIS — H60.542 ECZEMA OF EXTERNAL EAR, LEFT: Primary | ICD-10-CM

## 2020-02-07 RX ORDER — DOXYCYCLINE HYCLATE 100 MG
100 TABLET ORAL 2 TIMES DAILY
Qty: 14 TAB | Refills: 0 | Status: SHIPPED | OUTPATIENT
Start: 2020-02-07 | End: 2020-02-14

## 2020-02-07 RX ORDER — TRIAMCINOLONE ACETONIDE 1 MG/G
CREAM TOPICAL 2 TIMES DAILY
Qty: 15 G | Refills: 0 | Status: SHIPPED | OUTPATIENT
Start: 2020-02-07 | End: 2020-02-21 | Stop reason: ALTCHOICE

## 2020-02-07 NOTE — TELEPHONE ENCOUNTER
Briefly saw pt in office for concerns in left ear. No AOM noted - no erythema or bulging of TM. Eczematous changes noted. Will use topical TAC and cover for MRSA with Doxycycline given profession.

## 2020-02-08 ENCOUNTER — NURSE TRIAGE (OUTPATIENT)
Dept: OTHER | Facility: CLINIC | Age: 55
End: 2020-02-08

## 2020-02-08 ENCOUNTER — APPOINTMENT (OUTPATIENT)
Dept: CT IMAGING | Age: 55
DRG: 565 | End: 2020-02-08
Attending: EMERGENCY MEDICINE
Payer: COMMERCIAL

## 2020-02-08 ENCOUNTER — HOSPITAL ENCOUNTER (INPATIENT)
Age: 55
LOS: 3 days | Discharge: HOME | DRG: 565 | End: 2020-02-12
Attending: EMERGENCY MEDICINE | Admitting: INTERNAL MEDICINE
Payer: COMMERCIAL

## 2020-02-08 DIAGNOSIS — H60.12 CELLULITIS OF AURICLE OF LEFT EAR: Primary | ICD-10-CM

## 2020-02-08 LAB
ALBUMIN SERPL-MCNC: 4 G/DL (ref 3.5–5)
ALBUMIN/GLOB SERPL: 1 {RATIO} (ref 1.1–2.2)
ALP SERPL-CCNC: 88 U/L (ref 45–117)
ALT SERPL-CCNC: 45 U/L (ref 12–78)
ANION GAP SERPL CALC-SCNC: 4 MMOL/L (ref 5–15)
AST SERPL-CCNC: 41 U/L (ref 15–37)
BASOPHILS # BLD: 0 K/UL (ref 0–0.1)
BASOPHILS NFR BLD: 1 % (ref 0–1)
BILIRUB SERPL-MCNC: 0.5 MG/DL (ref 0.2–1)
BUN SERPL-MCNC: 7 MG/DL (ref 6–20)
BUN/CREAT SERPL: 11 (ref 12–20)
CALCIUM SERPL-MCNC: 9.3 MG/DL (ref 8.5–10.1)
CHLORIDE SERPL-SCNC: 105 MMOL/L (ref 97–108)
CO2 SERPL-SCNC: 29 MMOL/L (ref 21–32)
COMMENT, HOLDF: NORMAL
CREAT SERPL-MCNC: 0.62 MG/DL (ref 0.55–1.02)
DIFFERENTIAL METHOD BLD: NORMAL
EOSINOPHIL # BLD: 0.1 K/UL (ref 0–0.4)
EOSINOPHIL NFR BLD: 2 % (ref 0–7)
ERYTHROCYTE [DISTWIDTH] IN BLOOD BY AUTOMATED COUNT: 12.6 % (ref 11.5–14.5)
GLOBULIN SER CALC-MCNC: 4 G/DL (ref 2–4)
GLUCOSE SERPL-MCNC: 78 MG/DL (ref 65–100)
HCT VFR BLD AUTO: 42.7 % (ref 35–47)
HGB BLD-MCNC: 14.2 G/DL (ref 11.5–16)
IMM GRANULOCYTES # BLD AUTO: 0 K/UL (ref 0–0.04)
IMM GRANULOCYTES NFR BLD AUTO: 0 % (ref 0–0.5)
LACTATE SERPL-SCNC: 0.7 MMOL/L (ref 0.4–2)
LYMPHOCYTES # BLD: 1.2 K/UL (ref 0.8–3.5)
LYMPHOCYTES NFR BLD: 30 % (ref 12–49)
MCH RBC QN AUTO: 29.3 PG (ref 26–34)
MCHC RBC AUTO-ENTMCNC: 33.3 G/DL (ref 30–36.5)
MCV RBC AUTO: 88.2 FL (ref 80–99)
MONOCYTES # BLD: 0.3 K/UL (ref 0–1)
MONOCYTES NFR BLD: 8 % (ref 5–13)
NEUTS SEG # BLD: 2.4 K/UL (ref 1.8–8)
NEUTS SEG NFR BLD: 59 % (ref 32–75)
NRBC # BLD: 0 K/UL (ref 0–0.01)
NRBC BLD-RTO: 0 PER 100 WBC
PLATELET # BLD AUTO: 162 K/UL (ref 150–400)
PMV BLD AUTO: 10.5 FL (ref 8.9–12.9)
POTASSIUM SERPL-SCNC: 3.9 MMOL/L (ref 3.5–5.1)
PROT SERPL-MCNC: 8 G/DL (ref 6.4–8.2)
RBC # BLD AUTO: 4.84 M/UL (ref 3.8–5.2)
SAMPLES BEING HELD,HOLD: NORMAL
SODIUM SERPL-SCNC: 138 MMOL/L (ref 136–145)
WBC # BLD AUTO: 4.1 K/UL (ref 3.6–11)

## 2020-02-08 PROCEDURE — 96367 TX/PROPH/DG ADDL SEQ IV INF: CPT

## 2020-02-08 PROCEDURE — 83605 ASSAY OF LACTIC ACID: CPT

## 2020-02-08 PROCEDURE — 83036 HEMOGLOBIN GLYCOSYLATED A1C: CPT

## 2020-02-08 PROCEDURE — 70491 CT SOFT TISSUE NECK W/DYE: CPT

## 2020-02-08 PROCEDURE — 74011636320 HC RX REV CODE- 636/320: Performed by: RADIOLOGY

## 2020-02-08 PROCEDURE — 96365 THER/PROPH/DIAG IV INF INIT: CPT

## 2020-02-08 PROCEDURE — 74011000258 HC RX REV CODE- 258: Performed by: RADIOLOGY

## 2020-02-08 PROCEDURE — 36415 COLL VENOUS BLD VENIPUNCTURE: CPT

## 2020-02-08 PROCEDURE — 87040 BLOOD CULTURE FOR BACTERIA: CPT

## 2020-02-08 PROCEDURE — 99283 EMERGENCY DEPT VISIT LOW MDM: CPT

## 2020-02-08 PROCEDURE — 80053 COMPREHEN METABOLIC PANEL: CPT

## 2020-02-08 PROCEDURE — 85025 COMPLETE CBC W/AUTO DIFF WBC: CPT

## 2020-02-08 RX ORDER — VANCOMYCIN HYDROCHLORIDE
1250
Status: COMPLETED | OUTPATIENT
Start: 2020-02-08 | End: 2020-02-09

## 2020-02-08 RX ORDER — SODIUM CHLORIDE 0.9 % (FLUSH) 0.9 %
10 SYRINGE (ML) INJECTION
Status: COMPLETED | OUTPATIENT
Start: 2020-02-08 | End: 2020-02-08

## 2020-02-08 RX ADMIN — SODIUM CHLORIDE 100 ML: 900 INJECTION, SOLUTION INTRAVENOUS at 23:33

## 2020-02-08 RX ADMIN — IOPAMIDOL 100 ML: 612 INJECTION, SOLUTION INTRAVENOUS at 23:33

## 2020-02-08 RX ADMIN — Medication 10 ML: at 23:33

## 2020-02-08 NOTE — ED TRIAGE NOTES
Triage: Pt arrives from home with CC of left ear pain. She was diagnosed by her family doctor with an ear infection and prescribed doxycycline and amoxicillin. She has been taking them but now has continued pain, swelling to the ear, and headache.  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

## 2020-02-08 NOTE — TELEPHONE ENCOUNTER
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 Nursing judgement to be seen by HCP within 4  Hours.     Understanding verbalized    Reason for Disposition   Nursing judgment    Protocols used: NO GUIDELINE OR REFERENCE AVAILABLE-ADULT-

## 2020-02-09 ENCOUNTER — APPOINTMENT (OUTPATIENT)
Dept: GENERAL RADIOLOGY | Age: 55
DRG: 565 | End: 2020-02-09
Attending: INTERNAL MEDICINE
Payer: COMMERCIAL

## 2020-02-09 PROBLEM — L03.90 CELLULITIS: Status: ACTIVE | Noted: 2020-02-09

## 2020-02-09 LAB
ANION GAP SERPL CALC-SCNC: 5 MMOL/L (ref 5–15)
BUN SERPL-MCNC: 11 MG/DL (ref 6–20)
BUN/CREAT SERPL: 18 (ref 12–20)
CALCIUM SERPL-MCNC: 8.1 MG/DL (ref 8.5–10.1)
CHLORIDE SERPL-SCNC: 110 MMOL/L (ref 97–108)
CO2 SERPL-SCNC: 24 MMOL/L (ref 21–32)
CREAT SERPL-MCNC: 0.61 MG/DL (ref 0.55–1.02)
CRP SERPL-MCNC: 1.93 MG/DL (ref 0–0.6)
EST. AVERAGE GLUCOSE BLD GHB EST-MCNC: 103 MG/DL
GLUCOSE SERPL-MCNC: 106 MG/DL (ref 65–100)
HBA1C MFR BLD: 5.2 % (ref 4–5.6)
MAGNESIUM SERPL-MCNC: 1.7 MG/DL (ref 1.6–2.4)
PHOSPHATE SERPL-MCNC: 3.2 MG/DL (ref 2.6–4.7)
POTASSIUM SERPL-SCNC: 3.5 MMOL/L (ref 3.5–5.1)
SODIUM SERPL-SCNC: 139 MMOL/L (ref 136–145)
TSH SERPL DL<=0.05 MIU/L-ACNC: 1.52 UIU/ML (ref 0.36–3.74)

## 2020-02-09 PROCEDURE — 74011000258 HC RX REV CODE- 258: Performed by: EMERGENCY MEDICINE

## 2020-02-09 PROCEDURE — 83735 ASSAY OF MAGNESIUM: CPT

## 2020-02-09 PROCEDURE — 74011250636 HC RX REV CODE- 250/636: Performed by: INTERNAL MEDICINE

## 2020-02-09 PROCEDURE — 74011250636 HC RX REV CODE- 250/636: Performed by: EMERGENCY MEDICINE

## 2020-02-09 PROCEDURE — 84100 ASSAY OF PHOSPHORUS: CPT

## 2020-02-09 PROCEDURE — 74011000258 HC RX REV CODE- 258: Performed by: INTERNAL MEDICINE

## 2020-02-09 PROCEDURE — 71045 X-RAY EXAM CHEST 1 VIEW: CPT

## 2020-02-09 PROCEDURE — 74011250637 HC RX REV CODE- 250/637: Performed by: EMERGENCY MEDICINE

## 2020-02-09 PROCEDURE — 65270000029 HC RM PRIVATE

## 2020-02-09 PROCEDURE — 84443 ASSAY THYROID STIM HORMONE: CPT

## 2020-02-09 PROCEDURE — 36415 COLL VENOUS BLD VENIPUNCTURE: CPT

## 2020-02-09 PROCEDURE — 86140 C-REACTIVE PROTEIN: CPT

## 2020-02-09 PROCEDURE — 80048 BASIC METABOLIC PNL TOTAL CA: CPT

## 2020-02-09 PROCEDURE — 74011250637 HC RX REV CODE- 250/637: Performed by: INTERNAL MEDICINE

## 2020-02-09 RX ORDER — HEPARIN SODIUM 5000 [USP'U]/ML
5000 INJECTION, SOLUTION INTRAVENOUS; SUBCUTANEOUS EVERY 8 HOURS
Status: DISCONTINUED | OUTPATIENT
Start: 2020-02-09 | End: 2020-02-12 | Stop reason: HOSPADM

## 2020-02-09 RX ORDER — FERROUS SULFATE, DRIED 160(50) MG
1 TABLET, EXTENDED RELEASE ORAL DAILY
Status: DISCONTINUED | OUTPATIENT
Start: 2020-02-09 | End: 2020-02-12 | Stop reason: HOSPADM

## 2020-02-09 RX ORDER — AMOXICILLIN AND CLAVULANATE POTASSIUM 875; 125 MG/1; MG/1
1 TABLET, FILM COATED ORAL 2 TIMES DAILY
Qty: 20 TAB | Refills: 0 | Status: SHIPPED | OUTPATIENT
Start: 2020-02-09 | End: 2020-02-12

## 2020-02-09 RX ORDER — ACETAMINOPHEN 325 MG/1
650 TABLET ORAL
Status: DISCONTINUED | OUTPATIENT
Start: 2020-02-09 | End: 2020-02-12 | Stop reason: HOSPADM

## 2020-02-09 RX ORDER — SODIUM CHLORIDE 0.9 % (FLUSH) 0.9 %
5-40 SYRINGE (ML) INJECTION EVERY 8 HOURS
Status: DISCONTINUED | OUTPATIENT
Start: 2020-02-09 | End: 2020-02-12 | Stop reason: HOSPADM

## 2020-02-09 RX ORDER — ACETAMINOPHEN 500 MG
1000 TABLET ORAL
Status: COMPLETED | OUTPATIENT
Start: 2020-02-09 | End: 2020-02-09

## 2020-02-09 RX ORDER — KETOROLAC TROMETHAMINE 30 MG/ML
15 INJECTION, SOLUTION INTRAMUSCULAR; INTRAVENOUS
Status: ACTIVE | OUTPATIENT
Start: 2020-02-09 | End: 2020-02-12

## 2020-02-09 RX ORDER — CIPROFLOXACIN 500 MG/1
500 TABLET ORAL 2 TIMES DAILY
Qty: 20 TAB | Refills: 0 | Status: SHIPPED | OUTPATIENT
Start: 2020-02-09 | End: 2020-02-12

## 2020-02-09 RX ORDER — SODIUM CHLORIDE 0.9 % (FLUSH) 0.9 %
5-40 SYRINGE (ML) INJECTION AS NEEDED
Status: DISCONTINUED | OUTPATIENT
Start: 2020-02-09 | End: 2020-02-12 | Stop reason: HOSPADM

## 2020-02-09 RX ORDER — DIPHENHYDRAMINE HCL 25 MG
25 CAPSULE ORAL
Status: COMPLETED | OUTPATIENT
Start: 2020-02-09 | End: 2020-02-09

## 2020-02-09 RX ADMIN — Medication 10 ML: at 21:13

## 2020-02-09 RX ADMIN — PIPERACILLIN AND TAZOBACTAM 3.38 G: 3; .375 INJECTION, POWDER, LYOPHILIZED, FOR SOLUTION INTRAVENOUS at 21:13

## 2020-02-09 RX ADMIN — ACETAMINOPHEN 650 MG: 325 TABLET ORAL at 15:19

## 2020-02-09 RX ADMIN — CEFEPIME HYDROCHLORIDE 2 G: 2 INJECTION, POWDER, FOR SOLUTION INTRAVENOUS at 00:09

## 2020-02-09 RX ADMIN — Medication 10 ML: at 12:15

## 2020-02-09 RX ADMIN — PIPERACILLIN AND TAZOBACTAM 3.38 G: 3; .375 INJECTION, POWDER, LYOPHILIZED, FOR SOLUTION INTRAVENOUS at 06:22

## 2020-02-09 RX ADMIN — Medication 10 ML: at 06:22

## 2020-02-09 RX ADMIN — VANCOMYCIN HYDROCHLORIDE 1000 MG: 1 INJECTION, POWDER, LYOPHILIZED, FOR SOLUTION INTRAVENOUS at 12:12

## 2020-02-09 RX ADMIN — PIPERACILLIN AND TAZOBACTAM 3.38 G: 3; .375 INJECTION, POWDER, LYOPHILIZED, FOR SOLUTION INTRAVENOUS at 12:15

## 2020-02-09 RX ADMIN — OYSTER SHELL CALCIUM WITH VITAMIN D 1 TABLET: 500; 200 TABLET, FILM COATED ORAL at 08:24

## 2020-02-09 RX ADMIN — DIPHENHYDRAMINE HYDROCHLORIDE 25 MG: 25 CAPSULE ORAL at 03:08

## 2020-02-09 RX ADMIN — VANCOMYCIN HYDROCHLORIDE 1250 MG: 10 INJECTION, POWDER, LYOPHILIZED, FOR SOLUTION INTRAVENOUS at 00:56

## 2020-02-09 RX ADMIN — Medication 1 CAPSULE: at 08:24

## 2020-02-09 RX ADMIN — ACETAMINOPHEN 1000 MG: 500 TABLET ORAL at 00:30

## 2020-02-09 NOTE — H&P
[address]  HISTORY AND PHYSICAL    Name:  Pasha Rendon  MR#:  [id number]  :  1965  ACCOUNT #:  [de-identified]  ADMIT DATE:  2020    The patient was seen, evaluated and admitted by me on 2020. PRIMARY CARE PHYSICIAN:  Tatyana Vásquez MD    SOURCE OF INFORMATION:  The patient. CHIEF COMPLAINT:  Swelling and redness of the left ear. HISTORY OF PRESENT ILLNESS:  This is a 58-year-old woman with no significant past medical history, was in her usual state of health until about 2 days ago when the patient developed swelling and redness involving the auricle of the left ear. 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 The patient has been on these 2 antibiotics for 24 hours without any significant improvement in the swelling and redness of the left ear. The swelling is spreading to the periauricular region. No trauma to the ear. No drainage from the ear. No prior infection of the ear. The swelling is associated with low-grade fever, also associated with pain. The pain is constant with radiation to the neck, described as aching pain with no known relieving factors. The patient came to the emergency room because of worsening symptoms. When the patient arrived at the emergency room, CT scan of the soft tissue of the neck was obtained. The CT scan shows left periauricular soft tissue swelling with no abscess formation. The patient was started on antibiotics and was referred to the hospitalist service for evaluation for admission. No record of prior admission to this hospital.    PAST MEDICAL HISTORY:  Not significant. ALLERGIES:  NO KNOWN DRUG ALLERGIES. MEDICATIONS:  1. Calcium with vitamin D 1 tablet daily. 2.  Doxycycline 100 mg twice daily. 3.  Kenalog topical cream applied to affected area twice daily. FAMILY HISTORY:  This was reviewed. Mother has hypertension. Father has diabetes. PAST SURGICAL HISTORY:  This is significant for  section and myomectomy. SOCIAL HISTORY:  No history of alcohol or tobacco abuse. REVIEW OF SYSTEMS:  HEAD, EYES, EARS, NOSE AND THROAT:  No headache, no dizziness, no blurring of vision, no photophobia. RESPIRATORY SYSTEM:  No cough, no shortness of breath, no hemoptysis. CARDIOVASCULAR SYSTEM:  No chest pain, no orthopnea, no palpitation. GASTROINTESTINAL SYSTEM:  No nausea or vomiting. No diarrhea. No constipation. GENITOURINARY SYSTEM:  No dysuria, no urgency and no frequency. All other systems are reviewed and they are negative. PHYSICAL EXAMINATION:  GENERAL APPEARANCE:  The patient appeared ill in moderate distress. VITAL SIGNS:  On arrival at the emergency room, temperature 97.9, pulse 72, respiratory rate 18, blood pressure 113/76, oxygen saturation 100% on room air. HEAD:  Normocephalic, atraumatic. EYES:  Normal eye movements. No redness, no drainage, no discharge. EARS:  Redness, swelling and tenderness of the auricle of the left ear noted and present on admission. MOUTH AND THROAT:  No visible oral lesion. NECK:  Neck is supple. No JVD, no thyromegaly. CHEST:  Clear breath sounds. No wheezing, no crackles. HEART:  Normal S1 and S2, regular. No clinically appreciable murmur. ABDOMEN:  Soft, nontender, normal bowel sounds. CNS:  Alert, oriented x3. No gross focal neurological deficit. EXTREMITIES:  No edema. Pulses 2+ bilaterally. MUSCULOSKELETAL SYSTEM:  No evidence of joint deformity and swelling. SKIN:  Swelling, redness and tenderness of the auricle of the left ear as well as the periauricular region noted and present on admission. PSYCHIATRY:  Normal mood and affect. LYMPHATIC SYSTEM:  No cervical lymphadenopathy. DIAGNOSTIC DATA:  CT scan of the soft tissue of the neck shows left periauricular soft tissue swelling. No abscess. No significant adenopathy.     LABORATORY DATA: Lactic acid level 0.7. Hematology:  WBC 4.1, hemoglobin, hematocrit 42.7, platelets 928. Chemistry:  Sodium 138, potassium 3.9, chloride 105, CO2 29, glucose 78, BUN 7, creatinine 0.62, calcium 9.3, total bilirubin 0.5, ALT 45, AST 41, alkaline phosphatase 88, total protein 8.0, albumin level 4.0, globulin at 4.0.    ASSESSMENT:  Cellulitis of auricle of left ear. PLAN:  Cellulitis of auricle of left ear. We will admit the patient for further evaluation and treatment. We will start the patient on vancomycin and Zosyn. We will monitor the patient's clinical response to antibiotics. ENT consult will be requested to assist in further evaluation and treatment. We will check hemoglobin A1c level. We will check C-reactive protein level. We will also check chest x-ray. OTHER ISSUES:  Code status, the patient is a full code. We will place the patient on heparin for DVT prophylaxis. FUNCTIONAL STATUS PRIOR TO ADMISSION:  The patient is ambulatory with no assistant or device.       Jerel Larid MD      [id number]  D:  02/09/2020 5:16  T:  02/09/2020 6:36  JOB #:  [id number]  CC:  Edwin Wise MD

## 2020-02-09 NOTE — ROUTINE PROCESS
TRANSFER - OUT REPORT: 
 
Verbal report given to Tabitha López RN(name) on Vicenta Olvera  being transferred to (unit) for routine progression of care Report consisted of patients Situation, Background, Assessment and  
Recommendations(SBAR). Information from the following report(s) SBAR, ED Summary, [facility] and Recent Results was reviewed with the receiving nurse. Lines:  
Peripheral IV 02/08/20 Right Antecubital (Active) Site Assessment Clean, dry, & intact 2/9/2020  1:02 AM  
Phlebitis Assessment 0 2/9/2020  1:02 AM  
Dressing Status Clean, dry, & intact 2/9/2020  1:02 AM  
  
 
Opportunity for questions and clarification was provided. Patient transported with: 
  
Visit Vitals /57 (BP 1 Location: Left arm, BP Patient Position: At rest) Pulse 69 Temp 97.6 °F (36.4 °C) Resp 16 Ht 5' 3.5\" (1.613 m) Wt 62.6 kg (138 lb) SpO2 97% BMI 24.06 kg/m²

## 2020-02-09 NOTE — PROGRESS NOTES
TRANSFER - IN REPORT:    Verbal report received from [address] (name) on Elena Gamboa  being received from ED (unit) for routine progression of care      Report consisted of patients Situation, Background, Assessment and   Recommendations(SBAR). Information from the following report(s) ED Summary, MAR and Med Rec Status was reviewed with the receiving nurse. Opportunity for questions and clarification was provided. Assessment completed upon patients arrival to unit and care assumed. Patient arrived via wheelchair. Patient is alert and orient x 4, now in bed resting comfortably. Bed in lowest position, side rails up x 2, call bell within pt reach.

## 2020-02-09 NOTE — PROGRESS NOTES
Problem: Falls - Risk of  Goal: *Absence of Falls  Description  Document Sugey Flores Fall Risk and appropriate interventions in the flowsheet. Outcome: Progressing Towards Goal  Note:   Fall Risk Interventions:     Bed in lowest position. Side rails up x 2. Call bell within the patient reach. Personal belongings within patient reach. Problem: Pain  Goal: *Control of Pain  Outcome: Progressing Towards Goal  Note:   Patient verbalizes pain is controlled 0-2/10.

## 2020-02-09 NOTE — ED PROVIDER NOTES
HPI the patient has a 2-day history of a cellulitis of her left ear, and she has been on doxycycline and amoxicillin for 24 hours. She now has developed increased pain and swelling of the ear and also tenderness and mild swelling to the upper neck just inferior to the ear. She has had a low-grade fever. She has not had vomiting or other systemic symptoms.     Past Medical History:   Diagnosis Date    Ventral hernia 2014       Past Surgical History:   Procedure Laterality Date    HX  SECTION  2012    HX GYN      fibroid sx    HX GYN      c section    HX MYOMECTOMY   &          Family History:   Problem Relation Age of Onset    Hypertension Mother     Diabetes Father     Cancer Sister         Breast Cancer       Social History     Socioeconomic History    Marital status:      Spouse name: Not on file    Number of children: Not on file    Years of education: Not on file    Highest education level: Not on file   Occupational History    Not on file   Social Needs    Financial resource strain: Not on file    Food insecurity:     Worry: Not on file     Inability: Not on file    Transportation needs:     Medical: Not on file     Non-medical: Not on file   Tobacco Use    Smoking status: Never Smoker    Smokeless tobacco: Never Used   Substance and Sexual Activity    Alcohol use: No    Drug use: No    Sexual activity: Yes     Partners: Male   Lifestyle    Physical activity:     Days per week: Not on file     Minutes per session: Not on file    Stress: Not on file   Relationships    Social connections:     Talks on phone: Not on file     Gets together: Not on file     Attends [employer] service: Not on file     Active member of club or organization: Not on file     Attends meetings of clubs or organizations: Not on file     Relationship status: Not on file    Intimate partner violence:     Fear of current or ex partner: Not on file     Emotionally abused: Not on file Physically abused: Not on file     Forced sexual activity: Not on file   Other Topics Concern    Not on file   Social History Narrative    Not on file         ALLERGIES: Patient has no known allergies. Review of Systems   Constitutional: Positive for fever. HENT: Positive for ear pain. Negative for facial swelling. Respiratory: Negative for shortness of breath. Cardiovascular: Negative for chest pain. Gastrointestinal: Negative for abdominal pain, diarrhea, nausea and vomiting. Musculoskeletal: Positive for neck pain. Negative for back pain and neck stiffness. Skin: Negative for rash. Neurological: Negative. Negative for syncope and headaches. Psychiatric/Behavioral: Negative for confusion. All other systems reviewed and are negative. Vitals:    02/08/20 1756   BP: 113/76   Pulse: 72   Resp: 16   Temp: 97.9 °F (36.6 °C)   SpO2: 100%            Physical Exam  Constitutional:       General: She is not in acute distress. Appearance: She is well-developed. HENT:      Head: Normocephalic. Comments: The left auricle is red, edematous, and tender and appears to be a cellulitis. There is also tenderness and mild swelling of the neck inferior to the auricle     Left Ear: Tympanic membrane and ear canal normal.   Eyes:      Pupils: Pupils are equal, round, and reactive to light. Neck:      Musculoskeletal: Normal range of motion. Cardiovascular:      Rate and Rhythm: Normal rate. Pulmonary:      Effort: Pulmonary effort is normal.   Musculoskeletal: Normal range of motion. Skin:     General: Skin is warm and dry. Capillary Refill: Capillary refill takes less than 2 seconds. Neurological:      Mental Status: She is alert. Psychiatric:         Behavior: Behavior normal.          [facility]       Procedures      Hospitalist Perfect Serve for Admission  12:15 AM    ED Room Number: ER07/07  Patient Name and age:  Andrea Vergara 63 y.o.  female  Working Diagnosis:   1. Cellulitis of auricle of left ear      Readmission: no  Isolation Requirements:  no  Recommended Level of Care:  med/surg  Code Status:  Full Code  Department:[facility] Adult ED - 21   Other:  Cellulitis of ear. On atb for 2 days - no better.  Pt is one of our id

## 2020-02-09 NOTE — PROGRESS NOTES
Patient seen and examined. Otitis externa likely infectious. On IV antibiotics CT scan reviewed no abnormality seen. .  I discussed with ENT physician who looked at the CAT scan as well and said no surgical indication.

## 2020-02-09 NOTE — PROGRESS NOTES
Pharmacist Note - Vancomycin Dosing    Consult provided for this 63 y.o. female for indication of skin and soft tissue infection. Antibiotic regimen(s): Zosyn and Vancomycin  Patient on vancomycin PTA? NO     Recent Labs     20  0346 20  2225   WBC  --  4.1   CREA 0.61 0.62   BUN 11 7     Frequency of BMP: daily  Height: 161 cm  Weight: 62.6 kg  Est CrCl: 76 ml/min  Temp (24hrs), Av.9 °F (36.6 °C), Min:97.6 °F (36.4 °C), Max:98.2 °F (36.8 °C)    Cultures: blood      Goal trough = 10 - 15 mcg/mL    Therapy will be initiated with a loading dose of 1250 mg IV x 1 to be followed by a maintenance dose of 1000 mg IV every 12 hours. Pharmacy to follow patient daily and order levels / make dose adjustments as appropriate.

## 2020-02-10 ENCOUNTER — APPOINTMENT (OUTPATIENT)
Dept: VASCULAR SURGERY | Age: 55
DRG: 565 | End: 2020-02-10
Attending: INTERNAL MEDICINE
Payer: COMMERCIAL

## 2020-02-10 PROCEDURE — 74011250637 HC RX REV CODE- 250/637: Performed by: INTERNAL MEDICINE

## 2020-02-10 PROCEDURE — 74011000258 HC RX REV CODE- 258: Performed by: INTERNAL MEDICINE

## 2020-02-10 PROCEDURE — 65270000029 HC RM PRIVATE

## 2020-02-10 PROCEDURE — 93971 EXTREMITY STUDY: CPT

## 2020-02-10 PROCEDURE — 94760 N-INVAS EAR/PLS OXIMETRY 1: CPT

## 2020-02-10 PROCEDURE — 74011250636 HC RX REV CODE- 250/636: Performed by: INTERNAL MEDICINE

## 2020-02-10 RX ORDER — SODIUM CHLORIDE 9 MG/ML
75 INJECTION, SOLUTION INTRAVENOUS CONTINUOUS
Status: DISCONTINUED | OUTPATIENT
Start: 2020-02-10 | End: 2020-02-12 | Stop reason: HOSPADM

## 2020-02-10 RX ADMIN — SODIUM CHLORIDE 75 ML/HR: 900 INJECTION, SOLUTION INTRAVENOUS at 12:00

## 2020-02-10 RX ADMIN — VANCOMYCIN HYDROCHLORIDE 1000 MG: 1 INJECTION, POWDER, LYOPHILIZED, FOR SOLUTION INTRAVENOUS at 16:55

## 2020-02-10 RX ADMIN — Medication 10 ML: at 22:00

## 2020-02-10 RX ADMIN — Medication 10 ML: at 07:36

## 2020-02-10 RX ADMIN — PIPERACILLIN AND TAZOBACTAM 3.38 G: 3; .375 INJECTION, POWDER, LYOPHILIZED, FOR SOLUTION INTRAVENOUS at 10:05

## 2020-02-10 RX ADMIN — VANCOMYCIN HYDROCHLORIDE 1000 MG: 1 INJECTION, POWDER, LYOPHILIZED, FOR SOLUTION INTRAVENOUS at 02:06

## 2020-02-10 RX ADMIN — OYSTER SHELL CALCIUM WITH VITAMIN D 1 TABLET: 500; 200 TABLET, FILM COATED ORAL at 10:06

## 2020-02-10 RX ADMIN — Medication 1 CAPSULE: at 10:06

## 2020-02-10 RX ADMIN — ACETAMINOPHEN 650 MG: 325 TABLET ORAL at 02:05

## 2020-02-10 RX ADMIN — PIPERACILLIN AND TAZOBACTAM 3.38 G: 3; .375 INJECTION, POWDER, LYOPHILIZED, FOR SOLUTION INTRAVENOUS at 20:28

## 2020-02-10 NOTE — PROGRESS NOTES
[address] [facility] Adult  Hospitalist Group                                                                                          Hospitalist Progress Note  Dany Vallejo MD  Answering service: [phone] from in house phone        Date of Service:  2/10/2020  NAME:  Arianna Sparks  :  1965  MRN:  [id number]      Admission Summary: This is a 77-year-old woman with no significant past medical history, was in her usual state of health until about 2 days ago when the patient developed swelling and redness involving the auricle of the left ear. 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 The patient has been on these 2 antibiotics for 24 hours without any significant improvement in the swelling and redness of the left ear. The swelling is spreading to the periauricular region. No trauma to the ear. No drainage from the ear. No prior infection of the ear. The swelling is associated with low-grade fever, also associated with pain. The pain is constant with radiation to the neck, described as aching pain with no known relieving factors. The patient came to the emergency room because of worsening symptoms. When the patient arrived at the emergency room, CT scan of the soft tissue of the neck was obtained. The CT scan shows left periauricular soft tissue swelling with no abscess formation. The patient was started on antibiotics and was referred to the hospitalist service for evaluation for admission. No record of prior admission to this hospital.       Interval history / Subjective:   Patient seen and examined. Chart labs and imaging reviewed. Her swelling of the left ear is getting better but still redness and swelling in the area. Looks like the cartilage is involved. Consult ENT today. Discussion with vascular surgery if any blood vessels are involved.   CT scan reviewed again and does not show any thrombus in the vein . Started on IV fluids. Repeat CBC BMP today     Assessment & Plan:     Left ear chondritis and cellulitis with neck swelling. crp 1.93  Failure of doxy OP  Started on vanc and zosyn iv . Improving now though the swelling in the ear is persistent. Continue IV fluids. CT scan reviewed does not have any clot in the veins  and also did not have  Mastoid involvement. ENT consulted. Blood culture NGTD   Toradol for pain     Mild hypokalemia  Potassium of 3.5. Replace   Check again         Code status: full   DVT prophylaxis: SCD     Care Plan discussed with: Patient/Family  Anticipated Disposition: Home w/Family  Anticipated Discharge: Less than 24 hours     Hospital Problems  Date Reviewed: 2/9/2020          Codes Class Noted POA    * (Principal) Cellulitis ICD-10-CM: L03.90  ICD-9-CM: 682.9  2/9/2020 Yes                Review of Systems:   A comprehensive review of systems was negative except for that written in the HPI. Vital Signs:    Last 24hrs VS reviewed since prior progress note. Most recent are:  Visit Vitals  /82 (BP 1 Location: Left arm, BP Patient Position: At rest)   Pulse (!) 55   Temp 97.5 °F (36.4 °C)   Resp 17   Ht 5' 3.5\" (1.613 m)   Wt 62.6 kg (138 lb)   SpO2 100%   BMI 24.06 kg/m²       No intake or output data in the 24 hours ending 02/10/20 1447     Physical Examination:             Constitutional:  No acute distress, cooperative, pleasant    ENT:  Left ear has redness and swelling. Asymmetrical left neck swelling. Resp:  CTA bilaterally. No wheezing/rhonchi/rales. No accessory muscle use   CV:  Regular rhythm, normal rate, no murmurs, gallops, rubs    GI:  Soft, non distended, non tender. normoactive bowel sounds, no hepatosplenomegaly     Musculoskeletal:  No edema, warm, 2+ pulses throughout    Neurologic:  Moves all extremities.   AAOx3, CN II-XII reviewed            Data Review:    Review and/or order of clinical lab test      Labs:     Recent Labs 02/08/20  2225   WBC 4.1   HGB 14.2   HCT 42.7        Recent Labs     02/09/20  0346 02/08/20  2225    138   K 3.5 3.9   * 105   CO2 24 29   BUN 11 7   CREA 0.61 0.62   * 78   CA 8.1* 9.3   MG 1.7  --    PHOS 3.2  --      Recent Labs     02/08/20  2225   SGOT 41*   ALT 45   AP 88   TBILI 0.5   TP 8.0   ALB 4.0   GLOB 4.0     No results for input(s): INR, PTP, APTT, INREXT in the last 72 hours. No results for input(s): FE, TIBC, PSAT, FERR in the last 72 hours. No results found for: FOL, RBCF   No results for input(s): PH, PCO2, PO2 in the last 72 hours. No results for input(s): CPK, CKNDX, TROIQ in the last 72 hours.     No lab exists for component: CPKMB  Lab Results   Component Value Date/Time    Cholesterol, total 169 11/06/2019 02:10 PM    HDL Cholesterol 70 11/06/2019 02:10 PM    LDL, calculated 88 11/06/2019 02:10 PM    Triglyceride 54 11/06/2019 02:10 PM     No results found for: [facility]  Lab Results   Component Value Date/Time    Color YELLOW/STRAW 05/15/2014 12:43 PM    Appearance CLEAR 05/15/2014 12:43 PM    Specific gravity 1.007 05/15/2014 12:43 PM    pH (UA) 6.0 05/15/2014 12:43 PM    Protein NEGATIVE  05/15/2014 12:43 PM    Glucose NEGATIVE  05/15/2014 12:43 PM    Ketone NEGATIVE  05/15/2014 12:43 PM    Bilirubin NEGATIVE  05/15/2014 12:43 PM    Urobilinogen 0.2 05/15/2014 12:43 PM    Nitrites NEGATIVE  05/15/2014 12:43 PM    Leukocyte Esterase NEGATIVE  05/15/2014 12:43 PM    Epithelial cells FEW 05/15/2014 12:43 PM    Bacteria NEGATIVE  05/15/2014 12:43 PM    WBC 0-4 05/15/2014 12:43 PM    RBC 0-5 05/15/2014 12:43 PM         Medications Reviewed:     Current Facility-Administered Medications   Medication Dose Route Frequency    Vancomycin:  Please draw a trough immediately prior to the dose due at 13:00 today, 2/10/2020   Other ONCE    0.9% sodium chloride infusion  75 mL/hr IntraVENous CONTINUOUS    calcium-vitamin D (OS-JAVON) 500 mg-200 unit tablet  1 Tab Oral DAILY  sodium chloride (NS) flush 5-40 mL  5-40 mL IntraVENous Q8H    sodium chloride (NS) flush 5-40 mL  5-40 mL IntraVENous PRN    acetaminophen (TYLENOL) tablet 650 mg  650 mg Oral Q4H PRN    heparin (porcine) injection 5,000 Units  5,000 Units SubCUTAneous Q8H    ketorolac (TORADOL) injection 15 mg  15 mg IntraVENous Q6H PRN    lactobac ac& pc-s.therm-b.anim (REYMUNDO Q/RISAQUAD)  1 Cap Oral DAILY    piperacillin-tazobactam (ZOSYN) 3.375 g in 0.9% sodium chloride (MBP/ADV) 100 mL  3.375 g IntraVENous Q8H    Vancomycin Pharmacy Dosing   Other Rx Dosing/Monitoring    vancomycin (VANCOCIN) 1,000 mg in 0.9% sodium chloride (MBP/ADV) 250 mL  1,000 mg IntraVENous Q12H     ______________________________________________________________________  EXPECTED LENGTH OF STAY: 2d 4h  ACTUAL LENGTH OF STAY:          1                 Francisca Ortiz MD

## 2020-02-10 NOTE — PROGRESS NOTES
Spiritual Care Partner Volunteer visited patient in [place] [address] on 2/10/20. Documented by:   Chaplain Carvajal MDiv, MACE  [phone] PRAY [phone])

## 2020-02-10 NOTE — CONSULTS
Dr. Kvng Kincaid,    Thank you for involving me in this patient's care. Please see below for my assessment and feel free to contact me directly with any questions. -BF    OTOLARYNGOLOGY - HEAD AND NECK SURGERY HISTORY AND PHYSICAL    Requesting Physician:    Ely Solorzano MD     CC:   Left ear swelling    HPI:     Deni Peck is a 63 y.o. female seen today in consultation at the request of Dr. Kvng Kincaid for left ear cellulitis. This is a 50-year-old woman with no significant past medical history, was in her usual state of health until about 3 days ago when the patient developed swelling and redness involving the auricle of the left ear.  it started after pickign in the left ear. She has a history of eczema of her ear canals and the itching is usually severe and treated with steroid creams. 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  The patient had been on these 2 antibiotics for 24 hours without any significant improvement in the swelling and redness of the left ear.  The swelling is spreading to the periauricular region.  No trauma to the ear.  No drainage from the ear.  No prior infection of the ear.  The swelling is associated with low-grade fever, also associated with pain.  The pain is constant with radiation to the neck, described as aching pain with no known relieving factors.  The patient came to the emergency room because of worsening symptoms.  When the patient arrived at the emergency room, CT scan of the soft tissue of the neck was obtained.  I personally reviewed the CT scan which shows left periauricular soft tissue swelling with no abscess formation.  The patient was started on Zosyn and Vancomycin which has improved her symptoms.        Past Medical History:   Diagnosis Date    Ventral hernia 2014     Past Surgical History:   Procedure Laterality Date    HX  SECTION      HX GYN      fibroid sx    HX GYN      c section    HX MYOMECTOMY  2005 & 2011     Current Facility-Administered Medications   Medication Dose Route Frequency    Vancomycin:  Please draw a trough immediately prior to the dose due at 13:00 today, 2/10/2020   Other ONCE    0.9% sodium chloride infusion  75 mL/hr IntraVENous CONTINUOUS    calcium-vitamin D (OS-JAVON) 500 mg-200 unit tablet  1 Tab Oral DAILY    sodium chloride (NS) flush 5-40 mL  5-40 mL IntraVENous Q8H    sodium chloride (NS) flush 5-40 mL  5-40 mL IntraVENous PRN    acetaminophen (TYLENOL) tablet 650 mg  650 mg Oral Q4H PRN    heparin (porcine) injection 5,000 Units  5,000 Units SubCUTAneous Q8H    ketorolac (TORADOL) injection 15 mg  15 mg IntraVENous Q6H PRN    lactobac ac& pc-s.therm-b.anim (REYMUNDO Q/RISAQUAD)  1 Cap Oral DAILY    piperacillin-tazobactam (ZOSYN) 3.375 g in 0.9% sodium chloride (MBP/ADV) 100 mL  3.375 g IntraVENous Q8H    Vancomycin Pharmacy Dosing   Other Rx Dosing/Monitoring    vancomycin (VANCOCIN) 1,000 mg in 0.9% sodium chloride (MBP/ADV) 250 mL  1,000 mg IntraVENous Q12H      No Known Allergies  Family History   Problem Relation Age of Onset    Hypertension Mother     Diabetes Father     Cancer Sister         Breast Cancer     Social History     Tobacco Use    Smoking status: Never Smoker    Smokeless tobacco: Never Used   Substance Use Topics    Alcohol use: No    Drug use: No         REVIEW OF SYSTEMS  A full 10 point review of systems was performed today. The review was non-pertinent other than the details already listed in the history of present illness. Visit Vitals  /72 (BP 1 Location: Left arm, BP Patient Position: At rest)   Pulse 62   Temp 97.8 °F (36.6 °C)   Resp 15   Ht 5' 3.5\" (1.613 m)   Wt 62.6 kg (138 lb)   SpO2 100%   BMI 24.06 kg/m²        PHYSICAL EXAM  General:  No acute distress. Alert and oriented x 3. MSK:   Normal muscle bulk  Psych:  Mood and affect appropriate.    Neuro:  CN II - XII grossly intact bilaterally. Eyes:  PERRL/EOMI, no nystagmus. ENT:   EACs are patent, clean and dry. Thick skin from eczema. No purulence. Left lobule slightly tender. Slight edema of left auricle. TMs clear and intact with normal anatomic landmarks. No middle ear fluid. Anterior rhinoscopy without mucopurulence or polyps. OC/OP clear without masses or lesions. Lymph:  Neck soft and supple without lymphadenopathy. Resp:   No audible stridor or wheezing. Skin:   Head and neck skin is without suspicious lesions. DATA REVIEW:  CT neck dated 2/8/2020: I personally reviewed the scan which has the following pertinent findings:  Mastoid and middle ear space normal.  No abscess or mass. Left auricle edematous. Lab Results   Component Value Date/Time    WBC 4.1 02/08/2020 10:25 PM    HGB 14.2 02/08/2020 10:25 PM    HCT 42.7 02/08/2020 10:25 PM    PLATELET 844 03/55/5625 10:25 PM    MCV 88.2 02/08/2020 10:25 PM        ASSESSMENT/PLAN:  62 yo F with left auricular chondritis improving on Vancomycin and Zosyn. Cipro has caused tendonitis for her int he past, so consider changing to clindamycin in prep for discharge together with Otovel BID left ear x 7 days. F/u as outpatient in 7-10 days. Santo ROBINS  [facility], [address] [place] [address], [address], [facility]  [address] [facility], [address]   B) [phone] (T) [phone]

## 2020-02-11 LAB
ANION GAP SERPL CALC-SCNC: 5 MMOL/L (ref 5–15)
BUN SERPL-MCNC: 12 MG/DL (ref 6–20)
BUN/CREAT SERPL: 18 (ref 12–20)
CALCIUM SERPL-MCNC: 9 MG/DL (ref 8.5–10.1)
CHLORIDE SERPL-SCNC: 109 MMOL/L (ref 97–108)
CO2 SERPL-SCNC: 25 MMOL/L (ref 21–32)
CREAT SERPL-MCNC: 0.65 MG/DL (ref 0.55–1.02)
DATE LAST DOSE: ABNORMAL
GLUCOSE SERPL-MCNC: 76 MG/DL (ref 65–100)
POTASSIUM SERPL-SCNC: 3.6 MMOL/L (ref 3.5–5.1)
REPORTED DOSE,DOSE: ABNORMAL UNITS
REPORTED DOSE/TIME,TMG: ABNORMAL
SODIUM SERPL-SCNC: 139 MMOL/L (ref 136–145)
VANCOMYCIN TROUGH SERPL-MCNC: 13 UG/ML (ref 5–10)

## 2020-02-11 PROCEDURE — 74011250636 HC RX REV CODE- 250/636: Performed by: INTERNAL MEDICINE

## 2020-02-11 PROCEDURE — 36415 COLL VENOUS BLD VENIPUNCTURE: CPT

## 2020-02-11 PROCEDURE — 80202 ASSAY OF VANCOMYCIN: CPT

## 2020-02-11 PROCEDURE — 74011250637 HC RX REV CODE- 250/637: Performed by: INTERNAL MEDICINE

## 2020-02-11 PROCEDURE — 74011000258 HC RX REV CODE- 258: Performed by: INTERNAL MEDICINE

## 2020-02-11 PROCEDURE — 65270000029 HC RM PRIVATE

## 2020-02-11 PROCEDURE — 80048 BASIC METABOLIC PNL TOTAL CA: CPT

## 2020-02-11 RX ADMIN — Medication 10 ML: at 22:54

## 2020-02-11 RX ADMIN — Medication 10 ML: at 05:14

## 2020-02-11 RX ADMIN — OYSTER SHELL CALCIUM WITH VITAMIN D 1 TABLET: 500; 200 TABLET, FILM COATED ORAL at 09:07

## 2020-02-11 RX ADMIN — Medication 10 ML: at 16:31

## 2020-02-11 RX ADMIN — PIPERACILLIN AND TAZOBACTAM 3.38 G: 3; .375 INJECTION, POWDER, LYOPHILIZED, FOR SOLUTION INTRAVENOUS at 18:17

## 2020-02-11 RX ADMIN — PIPERACILLIN AND TAZOBACTAM 3.38 G: 3; .375 INJECTION, POWDER, LYOPHILIZED, FOR SOLUTION INTRAVENOUS at 05:10

## 2020-02-11 RX ADMIN — VANCOMYCIN HYDROCHLORIDE 1000 MG: 1 INJECTION, POWDER, LYOPHILIZED, FOR SOLUTION INTRAVENOUS at 16:31

## 2020-02-11 RX ADMIN — VANCOMYCIN HYDROCHLORIDE 1000 MG: 1 INJECTION, POWDER, LYOPHILIZED, FOR SOLUTION INTRAVENOUS at 05:13

## 2020-02-11 RX ADMIN — Medication 1 CAPSULE: at 09:07

## 2020-02-11 NOTE — PROGRESS NOTES
Following note sent to pharmacy via [facility]:    \"Zosyn that was started at 0530 and stopped at 0640 was just restarted . .. night shift nurse said to check with you/pharmacist to see if remaining doses should be retimed; Just to recap overnight issues:  Vanc and Zosyn times overlapped and when infusing together patient became nauseas and had lower abdominal cramping; both subsided when Zosyn was stopped. \"

## 2020-02-11 NOTE — PROGRESS NOTES
[address] [facility] Adult  Hospitalist Group                                                                                          Hospitalist Progress Note  Nessa Tom MD  Answering service: [phone] from in house phone        Date of Service:  2020  NAME:  Anitha Eisenberg  :  1965  MRN:  [id number]      Admission Summary: This is a 58-year-old woman with no significant past medical history, was in her usual state of health until about 2 days ago when the patient developed swelling and redness involving the auricle of the left ear. 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 The patient has been on these 2 antibiotics for 24 hours without any significant improvement in the swelling and redness of the left ear. The swelling is spreading to the periauricular region. No trauma to the ear. No drainage from the ear. No prior infection of the ear. The swelling is associated with low-grade fever, also associated with pain. The pain is constant with radiation to the neck, described as aching pain with no known relieving factors. The patient came to the emergency room because of worsening symptoms. When the patient arrived at the emergency room, CT scan of the soft tissue of the neck was obtained. The CT scan shows left periauricular soft tissue swelling with no abscess formation. The patient was started on antibiotics and was referred to the hospitalist service for evaluation for admission. No record of prior admission to this hospital.       Interval history / Subjective:   Patient seen and examined. Chart labs and imaging reviewed. Swelling of the left ear is better. The redness is better as well. Seen by ENT yesterday. Patient wants to see if the lobe of the ear also gets better. Overall improving  Possible discharge later today or tomorrow morning.      Assessment & Plan:     Left auriclular chondritis and cellulitis with neck swelling. crp 1.93  Failure of doxy OP  Started on vanc and zosyn iv . Improving now though the swelling in the ear is persistent. Continue IV fluids. CT scan reviewed does not have any clot in the veins  and also did not have  Mastoid involvement. ENT see the patient. Blood culture NGTD   Toradol for pain   Plan to discharge soon on p.o. antibiotics. Mild hypokalemia  Potassium of 3.5. Replace   Now 3.6        Code status: full   DVT prophylaxis: SCD     Care Plan discussed with: Patient/Family  Anticipated Disposition: Home w/Family  Anticipated Discharge: Less than 24 hours     Hospital Problems  Date Reviewed: 2/9/2020          Codes Class Noted POA    * (Principal) Cellulitis ICD-10-CM: L03.90  ICD-9-CM: 682.9  2/9/2020 Yes                Review of Systems:   A comprehensive review of systems was negative except for that written in the HPI. Vital Signs:    Last 24hrs VS reviewed since prior progress note. Most recent are:  Visit Vitals  /76 (BP 1 Location: Left arm, BP Patient Position: At rest)   Pulse 60   Temp 97.9 °F (36.6 °C)   Resp 16   Ht 5' 3.5\" (1.613 m)   Wt 62.6 kg (138 lb)   SpO2 100%   BMI 24.06 kg/m²         Intake/Output Summary (Last 24 hours) at 2/11/2020 1127  Last data filed at 2/10/2020 1906  Gross per 24 hour   Intake 662.5 ml   Output 300 ml   Net 362.5 ml        Physical Examination:             Constitutional:  No acute distress, cooperative, pleasant    ENT:  Left ear has redness and swelling. Asymmetrical left neck swelling. Now improving   Resp:  CTA bilaterally. No wheezing/rhonchi/rales. No accessory muscle use   CV:  Regular rhythm, normal rate, no murmurs, gallops, rubs    GI:  Soft, non distended, non tender. normoactive bowel sounds, no hepatosplenomegaly     Musculoskeletal:  No edema, warm, 2+ pulses throughout    Neurologic:  Moves all extremities.   AAOx3, CN II-XII reviewed            Data Review: Review and/or order of clinical lab test      Labs:     Recent Labs     02/08/20  2225   WBC 4.1   HGB 14.2   HCT 42.7        Recent Labs     02/11/20  0437 02/09/20  0346 02/08/20  2225    139 138   K 3.6 3.5 3.9   * 110* 105   CO2 25 24 29   BUN 12 11 7   CREA 0.65 0.61 0.62   GLU 76 106* 78   CA 9.0 8.1* 9.3   MG  --  1.7  --    PHOS  --  3.2  --      Recent Labs     02/08/20  2225   SGOT 41*   ALT 45   AP 88   TBILI 0.5   TP 8.0   ALB 4.0   GLOB 4.0     No results for input(s): INR, PTP, APTT, INREXT, INREXT in the last 72 hours. No results for input(s): FE, TIBC, PSAT, FERR in the last 72 hours. No results found for: FOL, RBCF   No results for input(s): PH, PCO2, PO2 in the last 72 hours. No results for input(s): CPK, CKNDX, TROIQ in the last 72 hours.     No lab exists for component: CPKMB  Lab Results   Component Value Date/Time    Cholesterol, total 169 11/06/2019 02:10 PM    HDL Cholesterol 70 11/06/2019 02:10 PM    LDL, calculated 88 11/06/2019 02:10 PM    Triglyceride 54 11/06/2019 02:10 PM     No results found for: Sanam Rider  Lab Results   Component Value Date/Time    Color YELLOW/STRAW 05/15/2014 12:43 PM    Appearance CLEAR 05/15/2014 12:43 PM    Specific gravity 1.007 05/15/2014 12:43 PM    pH (UA) 6.0 05/15/2014 12:43 PM    Protein NEGATIVE  05/15/2014 12:43 PM    Glucose NEGATIVE  05/15/2014 12:43 PM    Ketone NEGATIVE  05/15/2014 12:43 PM    Bilirubin NEGATIVE  05/15/2014 12:43 PM    Urobilinogen 0.2 05/15/2014 12:43 PM    Nitrites NEGATIVE  05/15/2014 12:43 PM    Leukocyte Esterase NEGATIVE  05/15/2014 12:43 PM    Epithelial cells FEW 05/15/2014 12:43 PM    Bacteria NEGATIVE  05/15/2014 12:43 PM    WBC 0-4 05/15/2014 12:43 PM    RBC 0-5 05/15/2014 12:43 PM         Medications Reviewed:     Current Facility-Administered Medications   Medication Dose Route Frequency    0.9% sodium chloride infusion  75 mL/hr IntraVENous CONTINUOUS    calcium-vitamin D (OS-JAOVN) 500 mg-200 unit tablet  1 Tab Oral DAILY    sodium chloride (NS) flush 5-40 mL  5-40 mL IntraVENous Q8H    sodium chloride (NS) flush 5-40 mL  5-40 mL IntraVENous PRN    acetaminophen (TYLENOL) tablet 650 mg  650 mg Oral Q4H PRN    heparin (porcine) injection 5,000 Units  5,000 Units SubCUTAneous Q8H    ketorolac (TORADOL) injection 15 mg  15 mg IntraVENous Q6H PRN    lactobac ac& pc-s.therm-b.anim (REYMUNDO Q/RISAQUAD)  1 Cap Oral DAILY    piperacillin-tazobactam (ZOSYN) 3.375 g in 0.9% sodium chloride (MBP/ADV) 100 mL  3.375 g IntraVENous Q8H    Vancomycin Pharmacy Dosing   Other Rx Dosing/Monitoring    vancomycin (VANCOCIN) 1,000 mg in 0.9% sodium chloride (MBP/ADV) 250 mL  1,000 mg IntraVENous Q12H     ______________________________________________________________________  EXPECTED LENGTH OF STAY: 2d 4h  ACTUAL LENGTH OF STAY:          2                 Allan Castro MD

## 2020-02-11 NOTE — PROGRESS NOTES
Problem: Cellulitis Care Plan (Adult)  Goal: *Control of acute pain  Outcome: Progressing Towards Goal  Goal: *Skin integrity maintained  Outcome: Progressing Towards Goal  Goal: *Absence of infection signs and symptoms  Outcome: Progressing Towards Goal     Problem: Falls - Risk of  Goal: *Absence of Falls  Description  Document Gaines Marcello Fall Risk and appropriate interventions in the flowsheet.   Outcome: Progressing Towards Goal  Note: Fall Risk Interventions:                                Problem: Pain  Goal: *Control of Pain  Outcome: Progressing Towards Goal  Goal: *PALLIATIVE CARE:  Alleviation of Pain  Outcome: Progressing Towards Goal

## 2020-02-11 NOTE — PROGRESS NOTES
Rn called spoke to pharm concerning vanc trough and current dose being given. Per pharmacist stated trough level is fine. Lab needs to update there parameters.

## 2020-02-11 NOTE — PROGRESS NOTES
Problem: Cellulitis Care Plan (Adult)  Goal: *Control of acute pain  Outcome: Progressing Towards Goal     Problem: Cellulitis Care Plan (Adult)  Goal: *Skin integrity maintained  Outcome: Progressing Towards Goal

## 2020-02-11 NOTE — PROGRESS NOTES
Patient unable to tolerate infusion with zosyn and vancomycin together. RN offered nausea medication and informed would need to get order. Patient refused stated she doesn't want anymore medication. RN informed patient zosyn can be stopped but would need to consult with pharm to see about getting time adjusted. RN spoke to pharm and informed of patient intolerance. Pharmacist stated ok to stop zosyn for now then resume after vancomycin. Pharmacist stated she would have next shift look into re timing for unsure if patient will be able to tolerate zosyn after vanc is done. RN informed pharm would have next shift nurse aware of changes. Patient informed of discussion between RN and pharm.

## 2020-02-12 VITALS
TEMPERATURE: 97.5 F | RESPIRATION RATE: 14 BRPM | BODY MASS INDEX: 23.56 KG/M2 | HEIGHT: 64 IN | HEART RATE: 67 BPM | DIASTOLIC BLOOD PRESSURE: 84 MMHG | SYSTOLIC BLOOD PRESSURE: 125 MMHG | WEIGHT: 138 LBS | OXYGEN SATURATION: 99 %

## 2020-02-12 PROBLEM — H61.032: Status: ACTIVE | Noted: 2020-02-12

## 2020-02-12 PROBLEM — L03.221 CELLULITIS OF NECK: Status: ACTIVE | Noted: 2020-02-12

## 2020-02-12 LAB
ANION GAP SERPL CALC-SCNC: 5 MMOL/L (ref 5–15)
BUN SERPL-MCNC: 9 MG/DL (ref 6–20)
BUN/CREAT SERPL: 12 (ref 12–20)
CALCIUM SERPL-MCNC: 8.9 MG/DL (ref 8.5–10.1)
CHLORIDE SERPL-SCNC: 113 MMOL/L (ref 97–108)
CO2 SERPL-SCNC: 19 MMOL/L (ref 21–32)
CREAT SERPL-MCNC: 0.73 MG/DL (ref 0.55–1.02)
GLUCOSE SERPL-MCNC: 75 MG/DL (ref 65–100)
POTASSIUM SERPL-SCNC: 3.9 MMOL/L (ref 3.5–5.1)
SODIUM SERPL-SCNC: 137 MMOL/L (ref 136–145)

## 2020-02-12 PROCEDURE — 74011250636 HC RX REV CODE- 250/636: Performed by: INTERNAL MEDICINE

## 2020-02-12 PROCEDURE — 74011000258 HC RX REV CODE- 258: Performed by: INTERNAL MEDICINE

## 2020-02-12 PROCEDURE — 80048 BASIC METABOLIC PNL TOTAL CA: CPT

## 2020-02-12 PROCEDURE — 36415 COLL VENOUS BLD VENIPUNCTURE: CPT

## 2020-02-12 PROCEDURE — 74011250637 HC RX REV CODE- 250/637: Performed by: INTERNAL MEDICINE

## 2020-02-12 RX ORDER — CIPROFLOXACIN AND FLUOCINOLONE ACETONIDE .75; .0625 MG/.25ML; MG/.25ML
0.25 SOLUTION AURICULAR (OTIC) 2 TIMES DAILY
Qty: 14 VIAL | Refills: 0 | Status: SHIPPED | OUTPATIENT
Start: 2020-02-12 | End: 2020-02-12 | Stop reason: SDUPTHER

## 2020-02-12 RX ORDER — AMOXICILLIN AND CLAVULANATE POTASSIUM 500; 125 MG/1; MG/1
1 TABLET, FILM COATED ORAL 2 TIMES DAILY
Qty: 14 TAB | Refills: 0 | Status: SHIPPED | OUTPATIENT
Start: 2020-02-12 | End: 2020-03-19 | Stop reason: SDUPTHER

## 2020-02-12 RX ORDER — AMOXICILLIN AND CLAVULANATE POTASSIUM 500; 125 MG/1; MG/1
1 TABLET, FILM COATED ORAL 2 TIMES DAILY
Qty: 14 TAB | Refills: 0 | Status: SHIPPED | OUTPATIENT
Start: 2020-02-12 | End: 2020-02-12 | Stop reason: SDUPTHER

## 2020-02-12 RX ORDER — CIPROFLOXACIN AND FLUOCINOLONE ACETONIDE .75; .0625 MG/.25ML; MG/.25ML
0.25 SOLUTION AURICULAR (OTIC) 2 TIMES DAILY
Qty: 14 VIAL | Refills: 0 | Status: SHIPPED | OUTPATIENT
Start: 2020-02-12 | End: 2020-02-19

## 2020-02-12 RX ADMIN — SODIUM CHLORIDE 75 ML/HR: 900 INJECTION, SOLUTION INTRAVENOUS at 04:21

## 2020-02-12 RX ADMIN — VANCOMYCIN HYDROCHLORIDE 1000 MG: 1 INJECTION, POWDER, LYOPHILIZED, FOR SOLUTION INTRAVENOUS at 05:38

## 2020-02-12 RX ADMIN — PIPERACILLIN AND TAZOBACTAM 3.38 G: 3; .375 INJECTION, POWDER, LYOPHILIZED, FOR SOLUTION INTRAVENOUS at 01:00

## 2020-02-12 RX ADMIN — Medication 1 CAPSULE: at 09:35

## 2020-02-12 RX ADMIN — Medication 10 ML: at 05:38

## 2020-02-12 RX ADMIN — PIPERACILLIN AND TAZOBACTAM 3.38 G: 3; .375 INJECTION, POWDER, LYOPHILIZED, FOR SOLUTION INTRAVENOUS at 09:33

## 2020-02-12 RX ADMIN — OYSTER SHELL CALCIUM WITH VITAMIN D 1 TABLET: 500; 200 TABLET, FILM COATED ORAL at 09:34

## 2020-02-12 NOTE — PROGRESS NOTES
[address] 33      2/12/2020      RE: Alex Alves      To Whom it May Concern: This is to certify that Alex Alves was admitted to the hospital from 2/9/2020 to 2/12/2020 and may return to work on 2/14/2020 without restrictions. Please feel free to contact my office if you have any questions or concerns. Thank you for your assistance in this matter.     Sincerely,      Malini Menard MD [facility]  [facility] [place] [address] , [place] [address]  Office    / [phone]

## 2020-02-12 NOTE — PROGRESS NOTES
Problem: Cellulitis Care Plan (Adult)  Goal: *Control of acute pain  Outcome: Progressing Towards Goal  Goal: *Skin integrity maintained  Outcome: Progressing Towards Goal  Goal: *Absence of infection signs and symptoms  Outcome: Progressing Towards Goal     Problem: Pain  Goal: *Control of Pain  Outcome: Progressing Towards Goal     Problem: Falls - Risk of  Goal: *Absence of Falls  Description  Document Paddy President Fall Risk and appropriate interventions in the flowsheet.   Outcome: Resolved/Met  Note:   Fall Risk Interventions:

## 2020-02-12 NOTE — DISCHARGE INSTRUCTIONS
Patient Education        Cellulitis: Care Instructions  Your Care Instructions    Cellulitis is a skin infection caused by bacteria, most often strep or staph. It often occurs after a break in the skin from a scrape, cut, bite, or puncture, or after a rash. Cellulitis may be treated without doing tests to find out what caused it. But your doctor may do tests, if needed, to look for a specific bacteria, like methicillin-resistant Staphylococcus aureus (MRSA). The doctor has checked you carefully, but problems can develop later. If you notice any problems or new symptoms, get medical treatment right away. Follow-up care is a key part of your treatment and safety. Be sure to make and go to all appointments, and call your doctor if you are having problems. It's also a good idea to know your test results and keep a list of the medicines you take. How can you care for yourself at home? · Take your antibiotics as directed. Do not stop taking them just because you feel better. You need to take the full course of antibiotics. · Prop up the infected area on pillows to reduce pain and swelling. Try to keep the area above the level of your heart as often as you can. · If your doctor told you how to care for your wound, follow your doctor's instructions. If you did not get instructions, follow this general advice:  ? Wash the wound with clean water 2 times a day. Don't use hydrogen peroxide or alcohol, which can slow healing. ? You may cover the wound with a thin layer of petroleum jelly, such as Vaseline, and a nonstick bandage. ? Apply more petroleum jelly and replace the bandage as needed. · Be safe with medicines. Take pain medicines exactly as directed. ? If the doctor gave you a prescription medicine for pain, take it as prescribed. ? If you are not taking a prescription pain medicine, ask your doctor if you can take an over-the-counter medicine.   To prevent cellulitis in the future  · Try to prevent cuts, scrapes, or other injuries to your skin. Cellulitis most often occurs where there is a break in the skin. · If you get a scrape, cut, mild burn, or bite, wash the wound with clean water as soon as you can to help avoid infection. Don't use hydrogen peroxide or alcohol, which can slow healing. · If you have swelling in your legs (edema), support stockings and good skin care may help prevent leg sores and cellulitis. · Take care of your feet, especially if you have diabetes or other conditions that increase the risk of infection. Wear shoes and socks. Do not go barefoot. If you have athlete's foot or other skin problems on your feet, talk to your doctor about how to treat them. When should you call for help? Call your doctor now or seek immediate medical care if:    · You have signs that your infection is getting worse, such as:  ? Increased pain, swelling, warmth, or redness. ? Red streaks leading from the area. ? Pus draining from the area. ? A fever.     · You get a rash.    Watch closely for changes in your health, and be sure to contact your doctor if:    · You do not get better as expected. Where can you learn more? Go to http://amy-jose.info/. Murali Calero in the search box to learn more about \"Cellulitis: Care Instructions. \"  Current as of: April 1, 2019  Content Version: 12.2  © 0839-9698 [employer], [employer]. Care instructions adapted under license by [employer] (which disclaims liability or warranty for this information). If you have questions about a medical condition or this instruction, always ask your healthcare professional. [place] [address] any warranty or liability for your use of this information. Please call Dr. Caro s office for a return appointment to be made 7-10 days after discharge. Gilbert Mobley, [address] [facility]   [facility], [address] [place] [address], [address] [place] [address]   (o) [phone]  (s) [phone]            Discharge Instructions       PATIENT ID: Raudel De Paz  MRN: [id number]   YOB: 1965    DATE OF ADMISSION: 2/8/2020  8:18 PM    DATE OF DISCHARGE: 2/12/2020    PRIMARY CARE PROVIDER: Efren Ram MD     ATTENDING PHYSICIAN: Carmen Puga MD  DISCHARGING PROVIDER: Keo Davies MD    To contact this individual call [phone] and ask the  to page. If unavailable ask to be transferred the Adult Hospitalist Department. DISCHARGE DIAGNOSES   Left auricle chondritis and neck cellulitis     CONSULTATIONS: IP CONSULT TO OTOLARYNGOLOGY    PROCEDURES/SURGERIES: * No surgery found *    PENDING TEST RESULTS:   At the time of discharge the following test results are still pending:     FOLLOW UP APPOINTMENTS:   Follow-up Information     Follow up With Specialties Details Why Contact Info    Efren Ram MD Internal Medicine In 1 week  [address] [address]  [place] [address] [phone]      Eneida Garcia MD Otolaryngology In 2 weeks  [address]  [phone]             ADDITIONAL CARE RECOMMENDATIONS:   Please follow up with ENT and pcp   Take ear drops and antibiotics as instructed   Daily probiotic while on antibiotic. DIET: Regular Diet  Oral Nutritional Supplements:  ACTIVITY: Activity as tolerated    WOUND CARE:     EQUIPMENT needed:       DISCHARGE MEDICATIONS:   See Medication Reconciliation Form    · It is important that you take the medication exactly as they are prescribed. · Keep your medication in the bottles provided by the pharmacist and keep a list of the medication names, dosages, and times to be taken in your wallet. · Do not take other medications without consulting your doctor. NOTIFY YOUR PHYSICIAN FOR ANY OF THE FOLLOWING:   Fever over 101 degrees for 24 hours.    Chest pain, shortness of breath, fever, chills, nausea, vomiting, diarrhea, change in mentation, falling, weakness, bleeding. Severe pain or pain not relieved by medications. Or, any other signs or symptoms that you may have questions about.       DISPOSITION:  x  Home With:   OT  PT  HH  RN       SNF/Inpatient Rehab/LTAC    Independent/assisted living    Hospice    Other:     CDMP Checked:   Yes x     PROBLEM LIST Updated:  Yes x       Signed:   Cecilia Aviles MD  2/12/2020  10:54 AM

## 2020-02-13 ENCOUNTER — PATIENT OUTREACH (OUTPATIENT)
Dept: OTHER | Age: 55
End: 2020-02-13

## 2020-02-13 LAB
BACTERIA SPEC CULT: NORMAL
SERVICE CMNT-IMP: NORMAL

## 2020-02-13 NOTE — PROGRESS NOTES
[facility] outreach      Patient admitted for Cellulitis failing OP abx 2/8-2/12. * IV Vanc/ Zosyn;  DC with Augmentin/ Cipro qtts. Initial attempt to contact patient for transitions of care. Left discreet message on voicemail with this CM contact information. Will attempt to contact again. Chart Review:    IP [facility] 2/8-2/12  TRANSITIONAL CARE with Jarad Hameed MD  Friday Feb 21, 2020 11:30 AM  [facility] at [address] [facility]  [phone]    amoxicillin-clavulanate  500-125 mg per tablet  Commonly known as: Augmentin  Your last dose was: Your next dose is: Take 1 Tab by  mouth two (2)  times a day for 7  days. 1 Tab  ciprofloxacin 0.3% -  fluocinolone 0.025%  0.3-0.025 % (0.25 mL) otic  solution  Commonly known as: Otovel  Your last dose was: Your next dose is:  Administer 0.25  mL in left ear two  (2) times a day    HISTORY OF PRESENT ILLNESS:  This is a 21-year-old woman with no significant past medical history, was in her usual state of health until about 2 days ago when the patient developed swelling and redness involving the auricle of the left ear. 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 The patient has been on these 2 antibiotics for 24 hours without any significant improvement in the swelling and redness of the left ear. The swelling is spreading to the periauricular region. No trauma to the ear. No drainage from the ear. No prior infection of the ear. The swelling is associated with low-grade fever, also associated with pain. The pain is constant with radiation to the neck, described as aching pain with no known relieving factors. The patient came to the emergency room because of worsening symptoms. When the patient arrived at the emergency room, CT scan of the soft tissue of the neck was obtained.   The CT scan shows left periauricular soft tissue swelling with no abscess formation. The patient was started on antibiotics and was referred to the hospitalist service for evaluation for admission. No record of prior admission to this hospital.       PLAN:  Cellulitis of auricle of left ear. We will admit the patient for further evaluation and treatment. We will start the patient on vancomycin and Zosyn. We will monitor the patient's clinical response to antibiotics. ENT consult will be requested to assist in further evaluation and treatment. We will check hemoglobin A1c level. We will check C-reactive protein level. We will also check chest x-ray. Special Requests:      Preliminary   NO SPECIAL REQUESTS    Culture result: NO GROWTH 4 DAYS          CM assessment 2/410  Rhett Concepcion is a 63year old female to [facility] with left ear chondritis and cellulitis with neck swelling. IVF and IV antibiotics started. Anticipate discharge to home on PO antibiotics.   Verified face sheet and demographics.     Shellie Pitts RN, BSN, ACM

## 2020-02-14 ENCOUNTER — PATIENT OUTREACH (OUTPATIENT)
Dept: OTHER | Age: 55
End: 2020-02-14

## 2020-02-14 NOTE — PROGRESS NOTES
Transition Of Care Note      Patient admitted for Cellulitis failing OP abx -. * IV Vanc/ Zosyn;  DC with Augmentin/ Cipro qtts. Medical History:     Past Medical History:   Diagnosis Date    Ventral hernia 2014       Care Manager contacted the patient by telephone to perform post IP discharge assessment. Verified  and zip code with patient as identifiers. 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 Patient's primary care provider relationship reviewed with patient and modified, as applicable. 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     - She does feel less energy, but feels that she must see her patients, with a lack of physician coverage. 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 Red Flags:  Red Flags for infection:    Fever, chills, persistent tenderness or swelling at site of wound. Redness and/or warmth to the touch, numbness, significant tingling or pain at the wound site. Condition Focused Assessment:   Patient reports the following:   Abreviated assessment with physician as patient. Sepsis Condition Focused Assessment    In the last 24 hour have you experienced; Fever no    Low body temperature no    Chills or shaking no    Sweating no    Fast heart rate no    Fast breathing no    Dizziness/lightheadedness no    Confusion or unusual change in mental status no    Diarrhea no    Nausea no    Vomiting no    Shortness of breath or difficulty breathing no    Less urine output no    Cold, clammy, and pale skin no     Skin rash or skin color changes no  Skin- any open wounds or incisions? No      Medication:   New Medications at Discharge: amoxicillin-clavulanate  500-125 mg per tablet  Commonly known as: Augmentin  Your last dose was: Your next dose is: Take 1 Tab by  mouth two (2)  times a day for 7  days.   1 Tab  ciprofloxacin 0.3% -  fluocinolone 0.025%  0.3-0.025 % (0.25 mL) otic  solution  Commonly known as: Otovel  Your last dose was: Your next dose is:  Administer 0.25  mL in left ear two  (2) times a day     Current Outpatient Medications   Medication Sig    amoxicillin-clavulanate (AUGMENTIN) 500-125 mg per tablet Take 1 Tab by mouth two (2) times a day for 7 days.  ciprofloxacin 0.3% -fluocinolone 0.025% (OTOVEL) 0.3-0.025 % (0.25 mL) otic solution Administer 0.25 mL in left ear two (2) times a day for 7 days.  doxycycline (VIBRA-TABS) 100 mg tablet Take 1 Tab by mouth two (2) times a day for 7 days.  triamcinolone acetonide (KENALOG) 0.1 % topical cream Apply  to affected area two (2) times a day. use thin layer    calcium-vitamin D (OYSTER SHELL) 500 mg(1,250mg) -200 unit per tablet Take 1 Tab by mouth daily. No current facility-administered medications for this visit. There are no discontinued medications. Performed medication reconciliation with patient, and patient verbalizes understanding of administration of home medications. There were no barriers to obtaining medications identified at this time. Was this a readmission? no     Discharge Instructions :  Reviewed discharge instructions with patient. Patient verbalizes understanding of discharge instructions and follow-up care. Advance Care Planning:   Patient was offered the opportunity to discuss advance care planning:  yes     Does patient have an Advance Directive:  no   If no, did you provide information on Caring Connections? yes     PCP/Specialist follow up: Patient scheduled to follow up with Tana Hand MD on 2/21. Future Appointments   Date Time Provider Vida Rios   2/21/2020 11:30 AM MD Cj Buckner Dr. is herself an ID doctor, so is knowlegable about s/sx of infection and sepsis. Patient given an opportunity to ask questions. No other clinical/social/functional needs noted.    The patient agrees to contact the PCP office for questions related to their healthcare. The patient expressed thanks, offered no additional questions and ended the call. * She will keep my number and call if problems arise. She sees no need for a return call. - CM will monitor chart after FU apt. And follow for one month post op.

## 2020-02-19 NOTE — DISCHARGE SUMMARY
Discharge Summary       PATIENT ID: Vi Lovell  MRN: [id number]   YOB: 1965    DATE OF ADMISSION: 2/8/2020  8:18 PM    DATE OF DISCHARGE: 2/ 12/2020    PRIMARY CARE PROVIDER: Maxi Ríos MD     ATTENDING PHYSICIAN: Sula Sandifer  DISCHARGING PROVIDER: Ryan Ortiz MD    To contact this individual call [phone] and ask the  to page. If unavailable ask to be transferred the Adult Hospitalist Department. CONSULTATIONS: None    PROCEDURES/SURGERIES: * No surgery found *    [address] COURSE:   67-year-old woman with no significant past medical history, was in her usual state of health until about 2 days ago when the patient developed swelling and redness involving the auricle of the left ear. 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 The patient has been on these 2 antibiotics for 24 hours without any significant improvement in the swelling and redness of the left ear. The swelling is spreading to the periauricular region. No trauma to the ear. No drainage from the ear. No prior infection of the ear. The swelling is associated with low-grade fever, also associated with pain. The pain is constant with radiation to the neck, described as aching pain with no known relieving factors. The patient came to the emergency room because of worsening symptoms. When the patient arrived at the emergency room, CT scan of the soft tissue of the neck was obtained. The CT scan shows left periauricular soft tissue swelling with no abscess formation. The patient was started on antibiotics and was referred to the hospitalist service for evaluation for admission. No record of prior admission to this hospital.    CT   There is left periauricular soft tissue swelling without abscess. No mass or significant adenopathy is identified within the neck.      The carotid and jugular vessels are patent.    The submandibular and parotid glands are normal. Thyroid is unremarkable.     No pharyngeal or laryngeal edema or mass is identified. Deep parapharyngeal and  retropharyngeal regions are normal. Subglottic airway is clear.     No abnormalities are identified in the skull base.      The visualized mastoid air cells and paranasal sinuses are clear.     The lung apices are clear.     Doppler     The subclavian vein(s) were visualized in the transverse and longitudinal views. The vessels showed normal color filling and compressibility. Doppler interrogation showed phasic and spontaneous flow. Left Upper Venous     The internal jugular, subclavian, axillary, proximal brachial, mid brachial, distal brachial, radial, cephalic upper arm, cephalic forearm and basilic upper arm vein(s) were visualized in the transverse and longitudinal views. The vessels showed normal color filling and compressibility. Doppler interrogation showed phasic and spontaneous flow. Hospital Course     58-year-old woman who was presented to the hospital and then the left ear and neck swelling and getting admitted for left auricular chondritis and left neck cellulitis. She was maintained on vancomycin and Zosyn IV. ENT had seen the patient and started her on auricular drops and wanted to see her outpatient. Blood cultures were negative. Doppler of the neck done because of swelling in the left leg was negative for any clot. Her chondritis and left neck swelling/cellulitis slowly got better with IV antibiotics and she was discharged from the hospital to follow-up outpatient. Also had mild hypokalemia which was PO and IV.       Left auriclular chondritis and cellulitis with neck swelling. crp 1.93  Failure of doxy OP  Started on vanc and zosyn iv . Improving now though the swelling in the ear is persistent. Continue IV fluids. CT scan reviewed does not have any clot in the veins  and also did not have  Mastoid involvement.   ENT see the patient. Blood culture NGTD   Toradol for pain   Plan to discharge soon on p.o. antibiotics.     Mild hypokalemia  Potassium of 3.5. Replace   Now 3.6           DISCHARGE DIAGNOSES / PLAN:      1. ADDITIONAL CARE RECOMMENDATIONS:        PENDING TEST RESULTS:   At the time of discharge the following test results are still pending:     FOLLOW UP APPOINTMENTS:    Follow-up Information     Follow up With Specialties Details Why Contact Info    Rosana Goodman MD Internal Medicine On 2/21/2020 Hospital f/u PCP appointment Friday, 2/21/20 @ 11:30 a.m.  Tiffany Pryor [address]  [address]      Frederick Alvarez MD Otolaryngology In 2 weeks  [address]  [phone]               DIET: Regular Diet  Oral Nutritional Supplements:    ACTIVITY: Activity as tolerated    WOUND CARE:     EQUIPMENT needed:       DISCHARGE MEDICATIONS:  Discharge Medication List as of 2/12/2020  1:27 PM      CONTINUE these medications which have CHANGED    Details   amoxicillin-clavulanate (AUGMENTIN) 500-125 mg per tablet Take 1 Tab by mouth two (2) times a day for 7 days. , Print, Disp-14 Tab, R-0      ciprofloxacin 0.3% -fluocinolone 0.025% (OTOVEL) 0.3-0.025 % (0.25 mL) otic solution Administer 0.25 mL in left ear two (2) times a day for 7 days. , Print, Disp-14 Vial, R-0         CONTINUE these medications which have NOT CHANGED    Details   doxycycline (VIBRA-TABS) 100 mg tablet Take 1 Tab by mouth two (2) times a day for 7 days. , Normal, Disp-14 Tab, R-0      triamcinolone acetonide (KENALOG) 0.1 % topical cream Apply  to affected area two (2) times a day. use thin layer, Normal, Disp-15 g, R-0      calcium-vitamin D (OYSTER SHELL) 500 mg(1,250mg) -200 unit per tablet Take 1 Tab by mouth daily. , Historical Med               NOTIFY YOUR PHYSICIAN FOR ANY OF THE FOLLOWING:   Fever over 101 degrees for 24 hours.    Chest pain, shortness of breath, fever, chills, nausea, vomiting, diarrhea, change in mentation, falling, weakness, bleeding. Severe pain or pain not relieved by medications. Or, any other signs or symptoms that you may have questions about. DISPOSITION:  x  Home With:   OT  PT  HH  RN       Long term SNF/Inpatient Rehab    Independent/assisted living    Hospice    Other:       PATIENT CONDITION AT DISCHARGE:     Functional status    Poor     Deconditioned    x Independent      Cognition   x  Lucid     Forgetful     Dementia      Catheters/lines (plus indication)    Sampson     PICC     PEG    x None      Code status    x Full code     DNR      PHYSICAL EXAMINATION AT DISCHARGE:  General:          Alert, cooperative, no distress, appears stated age. HEENT:           Atraumatic, anicteric sclerae, pink conjunctivae, mild left auricle erythema                          No oral ulcers, mucosa moist, throat clear, dentition fair  Neck:               Supple, symmetrical  Lungs:             Clear to auscultation bilaterally. No Wheezing or Rhonchi. No rales. Chest wall:      No tenderness  No Accessory muscle use. Heart:              Regular  rhythm,  No  murmur   No edema  Abdomen:        Soft, non-tender. Not distended. Bowel sounds normal  Extremities:     No cyanosis. No clubbing,                            Skin turgor normal, Capillary refill normal  Skin:                Not pale. Not Jaundiced  No rashes   Psych:             Not anxious or agitated.   Neurologic:      Alert, moves all extremities, answers questions appropriately and responds to commands       CHRONIC MEDICAL DIAGNOSES:  Problem List as of 2/12/2020 Date Reviewed: 2/9/2020          Codes Class Noted - Resolved    * (Principal) Cellulitis ICD-10-CM: L03.90  ICD-9-CM: 682.9  2/9/2020 - Present        Ventral hernia ICD-10-CM: K43.9  ICD-9-CM: 553.20  8/28/2014 - Present        Cellulitis of neck ICD-10-CM: W75.858  ICD-9-CM: 682.1  2/12/2020 - Present        Chondritis of auricle, left ICD-10-CM: W79.376  ICD-9-CM: 380.03  2/12/2020 - Present              Greater than 30  minutes were spent with the patient on counseling and coordination of care    Signed:   Lucila Daniel MD  2/19/2020  2:59 PM

## 2020-02-21 ENCOUNTER — OFFICE VISIT (OUTPATIENT)
Dept: FAMILY MEDICINE CLINIC | Age: 55
End: 2020-02-21

## 2020-02-21 VITALS
WEIGHT: 140 LBS | BODY MASS INDEX: 23.9 KG/M2 | OXYGEN SATURATION: 100 % | RESPIRATION RATE: 20 BRPM | DIASTOLIC BLOOD PRESSURE: 68 MMHG | SYSTOLIC BLOOD PRESSURE: 119 MMHG | HEART RATE: 76 BPM | TEMPERATURE: 96.8 F | HEIGHT: 64 IN

## 2020-02-21 DIAGNOSIS — L03.221 CELLULITIS OF NECK: ICD-10-CM

## 2020-02-21 DIAGNOSIS — H60.542 ECZEMA OF EXTERNAL EAR, LEFT: Primary | ICD-10-CM

## 2020-02-21 DIAGNOSIS — B37.9 ANTIBIOTIC-INDUCED YEAST INFECTION: ICD-10-CM

## 2020-02-21 DIAGNOSIS — T36.95XA ANTIBIOTIC-INDUCED YEAST INFECTION: ICD-10-CM

## 2020-02-21 RX ORDER — FLUCONAZOLE 150 MG/1
150 TABLET ORAL DAILY
Qty: 1 TAB | Refills: 1 | Status: SHIPPED | OUTPATIENT
Start: 2020-02-21 | End: 2020-02-22

## 2020-02-21 RX ORDER — CLOTRIMAZOLE AND BETAMETHASONE DIPROPIONATE 10; .64 MG/G; MG/G
CREAM TOPICAL
Qty: 15 G | Refills: 1 | Status: SHIPPED | OUTPATIENT
Start: 2020-02-21 | End: 2020-09-30

## 2020-02-21 NOTE — PATIENT INSTRUCTIONS

## 2020-02-21 NOTE — PROGRESS NOTES
Chief Complaint   Patient presents with    Transitions Of Care     HPI:  Vi Lovell is a 63 y.o. female withno significant past medical history presents to Transitions Of Care  Patient was admitted at [facility] swelling and tenderness of left ear lobe and surrounding tissue 20-20  Her diagnosis was Left auriclular chondritis and cellulitis with neck swelling. She is doing well. Patient was discharged with referral to ENT. Review of Systems  As per hpi    Past Medical History:   Diagnosis Date    Ventral hernia 2014     Past Surgical History:   Procedure Laterality Date    HX  SECTION  2012    HX GYN      fibroid sx    HX GYN      c section    HX MYOMECTOMY   &      Social History     Socioeconomic History    Marital status:      Spouse name: Not on file    Number of children: Not on file    Years of education: Not on file    Highest education level: Not on file   Tobacco Use    Smoking status: Never Smoker    Smokeless tobacco: Never Used   Substance and Sexual Activity    Alcohol use: No    Drug use: No    Sexual activity: Yes     Partners: Male     Family History   Problem Relation Age of Onset    Hypertension Mother     Diabetes Father     Cancer Sister         Breast Cancer     Current Outpatient Medications   Medication Sig Dispense Refill    calcium-vitamin D (OYSTER SHELL) 500 mg(1,250mg) -200 unit per tablet Take 1 Tab by mouth daily.  triamcinolone acetonide (KENALOG) 0.1 % topical cream Apply  to affected area two (2) times a day.  use thin layer 15 g 0     No Known Allergies    Objective:  Visit Vitals  /68   Pulse 76   Temp 96.8 °F (36 °C) (Oral)   Resp 20   Ht 5' 3.5\" (1.613 m)   Wt 140 lb (63.5 kg)   SpO2 100%   BMI 24.41 kg/m²     Physical Exam:   General appearance - alert, well appearing in no distress  Mental status - alert, oriented to person, place, and time  Neck - supple, no significant adenopathy   Chest - clear to auscultation, no wheezes, rales or rhonchi    Heart - normal rate, regular rhythm, normal blood pressure  Abdomen - soft, nontender, nondistended, no organomegaly  Ext-peripheral pulses normal, no pedal edema  Neuro - no focal findings     Assessment/Plan:  Diagnoses and all orders for this visit:    Eczema of external ear, left  -     clotrimazole-betamethasone (LOTRISONE) topical cream; Apply to affected area 2 times a day, Normal, Disp-15 g, R-1    Antibiotic-induced yeast infection  -     fluconazole (DIFLUCAN) 150 mg tablet; Take 1 Tab by mouth daily for 1 day. FDA advises cautious prescribing of oral fluconazole in pregnancy. , Normal, Disp-1 Tab, R-1    Cellulitis of neck      Patient Instructions          Cellulitis: Care Instructions  Your Care Instructions    Cellulitis is a skin infection caused by bacteria, most often strep or staph. It often occurs after a break in the skin from a scrape, cut, bite, or puncture, or after a rash. Cellulitis may be treated without doing tests to find out what caused it. But your doctor may do tests, if needed, to look for a specific bacteria, like methicillin-resistant Staphylococcus aureus (MRSA). The doctor has checked you carefully, but problems can develop later. If you notice any problems or new symptoms, get medical treatment right away. Follow-up care is a key part of your treatment and safety. Be sure to make and go to all appointments, and call your doctor if you are having problems. It's also a good idea to know your test results and keep a list of the medicines you take. How can you care for yourself at home? · Take your antibiotics as directed. Do not stop taking them just because you feel better. You need to take the full course of antibiotics. · Prop up the infected area on pillows to reduce pain and swelling. Try to keep the area above the level of your heart as often as you can.   · If your doctor told you how to care for your wound, follow your doctor's instructions. If you did not get instructions, follow this general advice:  ? Wash the wound with clean water 2 times a day. Don't use hydrogen peroxide or alcohol, which can slow healing. ? You may cover the wound with a thin layer of petroleum jelly, such as Vaseline, and a nonstick bandage. ? Apply more petroleum jelly and replace the bandage as needed. · Be safe with medicines. Take pain medicines exactly as directed. ? If the doctor gave you a prescription medicine for pain, take it as prescribed. ? If you are not taking a prescription pain medicine, ask your doctor if you can take an over-the-counter medicine. To prevent cellulitis in the future  · Try to prevent cuts, scrapes, or other injuries to your skin. Cellulitis most often occurs where there is a break in the skin. · If you get a scrape, cut, mild burn, or bite, wash the wound with clean water as soon as you can to help avoid infection. Don't use hydrogen peroxide or alcohol, which can slow healing. · If you have swelling in your legs (edema), support stockings and good skin care may help prevent leg sores and cellulitis. · Take care of your feet, especially if you have diabetes or other conditions that increase the risk of infection. Wear shoes and socks. Do not go barefoot. If you have athlete's foot or other skin problems on your feet, talk to your doctor about how to treat them. When should you call for help? Call your doctor now or seek immediate medical care if:    · You have signs that your infection is getting worse, such as:  ? Increased pain, swelling, warmth, or redness. ? Red streaks leading from the area. ? Pus draining from the area. ? A fever.     · You get a rash.    Watch closely for changes in your health, and be sure to contact your doctor if:    · You do not get better as expected. Where can you learn more? Go to http://amy-jose.info/.   Rolly Elliott in the search box to learn more about \"Cellulitis: Care Instructions. \"  Current as of: April 1, 2019  Content Version: 12.2  © 5058-0114 [employer]. Care instructions adapted under license by [employer] (which disclaims liability or warranty for this information). If you have questions about a medical condition or this instruction, always ask your healthcare professional. [place] [address] any warranty or liability for your use of this information. Follow-up and Dispositions    · Return in about 3 months (around 5/21/2020), or if symptoms worsen or fail to improve, for routine follow up.

## 2020-02-24 ENCOUNTER — DOCUMENTATION ONLY (OUTPATIENT)
Dept: OTHER | Age: 55
End: 2020-02-24

## 2020-02-24 NOTE — PROGRESS NOTES
Chart Review only   No patient call. Patient admitted for Cellulitis failing OP abx 2/8-2/12.    * IV Vanc/ Zosyn;  DC with Augmentin/ Cipro qtts. * Dr. Kirt Rogers requested only one FU call on closing [facility] episode. * Attended PCP FU 2/21. Chart Review:  PCP FU visit attended 2/21    HPI:  Anabela Soria is a 63 y.o. female withno significant past medical history presents to [facility]  Patient was admitted at [facility] swelling and tenderness of left ear lobe and surrounding tissue 2/8/20-2/12/20  Her diagnosis was Left auriclular chondritis and cellulitis with neck swelling. She is doing well. Patient was discharged with referral to ENT. Objective:  Visit Vitals  /68   Pulse 76   Temp 96.8 °F (36 °C) (Oral)   Resp 20   Ht 5' 3.5\" (1.613 m)   Wt 140 lb (63.5 kg)   SpO2 100%   BMI 24.41 kg/m²     Assessment/Plan:  Diagnoses and all orders for this visit:     Eczema of external ear, left  -     clotrimazole-betamethasone (LOTRISONE) topical cream; Apply to affected area 2 times a day, Normal, Disp-15 g, R-1     Antibiotic-induced yeast infection  -     fluconazole (DIFLUCAN) 150 mg tablet; Take 1 Tab by mouth daily for 1 day. FDA advises cautious prescribing of oral fluconazole in pregnancy. , Normal, Disp-1 Tab, R-1

## 2020-03-17 ENCOUNTER — PATIENT OUTREACH (OUTPATIENT)
Dept: OTHER | Age: 55
End: 2020-03-17

## 2020-03-17 NOTE — PROGRESS NOTES
[facility]  Wrap Up    Patient admitted for Cellulitis failing OP abx 2/8-2/12. * IV Vanc/ Zosyn;  DC with Augmentin/ Cipro qtts. Resolving current episode (Transitions of care complete). No further ED/UC or hospital admissions within 30 days post discharge. Patient attended follow-up appointments as directed. Final  Contact with Dr. Daisy Mead for transitions of care. * She is doing well. Very busy at work. * Completed abx course with no problems.

## 2020-03-19 RX ORDER — AMOXICILLIN AND CLAVULANATE POTASSIUM 500; 125 MG/1; MG/1
1 TABLET, FILM COATED ORAL 2 TIMES DAILY
Qty: 14 TAB | Refills: 0 | Status: SHIPPED | OUTPATIENT
Start: 2020-03-19 | End: 2020-03-26

## 2020-09-15 ENCOUNTER — EMPLOYEE WELLNESS (OUTPATIENT)
Dept: OTHER | Facility: CLINIC | Age: 55
End: 2020-09-15

## 2020-09-15 LAB
CHOLEST SERPL-MCNC: 168 MG/DL
GLUCOSE SERPL-MCNC: 92 MG/DL (ref 65–100)
HDLC SERPL-MCNC: 77 MG/DL
LDLC SERPL CALC-MCNC: 82 MG/DL (ref 0–100)
TRIGL SERPL-MCNC: 45 MG/DL (ref ?–150)

## 2020-09-30 ENCOUNTER — OFFICE VISIT (OUTPATIENT)
Dept: FAMILY MEDICINE CLINIC | Age: 55
End: 2020-09-30
Payer: COMMERCIAL

## 2020-09-30 VITALS
SYSTOLIC BLOOD PRESSURE: 104 MMHG | DIASTOLIC BLOOD PRESSURE: 59 MMHG | HEART RATE: 70 BPM | WEIGHT: 145 LBS | RESPIRATION RATE: 20 BRPM | OXYGEN SATURATION: 99 % | TEMPERATURE: 97.1 F | HEIGHT: 64 IN | BODY MASS INDEX: 24.75 KG/M2

## 2020-09-30 DIAGNOSIS — Z01.818 PRE-OP EVALUATION: Primary | ICD-10-CM

## 2020-09-30 PROCEDURE — 99213 OFFICE O/P EST LOW 20 MIN: CPT | Performed by: INTERNAL MEDICINE

## 2020-09-30 RX ORDER — BISMUTH SUBSALICYLATE 262 MG
1 TABLET,CHEWABLE ORAL DAILY
COMMUNITY

## 2020-09-30 RX ORDER — MOXIFLOXACIN 5 MG/ML
SOLUTION/ DROPS OPHTHALMIC
COMMUNITY
Start: 2020-09-09 | End: 2021-07-07

## 2020-09-30 RX ORDER — PREDNISOLONE ACETATE 10 MG/ML
SUSPENSION/ DROPS OPHTHALMIC
COMMUNITY
Start: 2020-09-09 | End: 2021-07-07

## 2020-09-30 RX ORDER — KETOROLAC TROMETHAMINE 5 MG/ML
SOLUTION OPHTHALMIC
COMMUNITY
Start: 2020-09-09 | End: 2021-07-07

## 2020-09-30 NOTE — PROGRESS NOTES
Chief Complaint   Patient presents with    Pre-op Exam     surgery 10/8/2020     HPI:  Shaquille Herman is a 63 y.o. female with significant medical history below presents for pre-op evaluation. Patient has a cataract extraction with Lens implantation procedure scheduled with Dr. Chen Leblanc on 10/08/2020 at [facility]. Today, she has no new complaints. Blood pressure is at goal. Denies fever/chills, cough, shortness of breath, wheezing. Most recent blood work have been reviewed and stable. Review of Systems  As per hpi    Past Medical History:   Diagnosis Date    Eczema     Ventral hernia 2014     Past Surgical History:   Procedure Laterality Date    HX  SECTION  2012    HX GYN      fibroid sx    HX GYN      c section    HX MYOMECTOMY   &      Social History     Socioeconomic History    Marital status:      Spouse name: Not on file    Number of children: Not on file    Years of education: Not on file    Highest education level: Not on file   Tobacco Use    Smoking status: Never Smoker    Smokeless tobacco: Never Used   Substance and Sexual Activity    Alcohol use: No    Drug use: No    Sexual activity: Yes     Partners: Male     Family History   Problem Relation Age of Onset    Hypertension Mother     Diabetes Father     Cancer Sister         Breast Cancer     Current Outpatient Medications   Medication Sig Dispense Refill    moxifloxacin (VIGAMOX) 0.5 % ophthalmic solution       prednisoLONE acetate (PRED FORTE) 1 % ophthalmic suspension       ketorolac (ACULAR) 0.5 % ophthalmic solution       multivitamin (ONE A DAY) tablet Take 1 Tab by mouth daily.  calcium-vitamin D (OYSTER SHELL) 500 mg(1,250mg) -200 unit per tablet Take 1 Tab by mouth daily.        No Known Allergies    Objective:  Visit Vitals  BP (!) 104/59   Pulse 70   Temp 97.1 °F (36.2 °C) (Temporal)   Resp 20   Ht 5' 3.5\" (1.613 m)   Wt 145 lb (65.8 kg)   SpO2 99%   BMI 25.28 kg/m²     Physical Exam:   General appearance - alert, well appearing in no distress  Mental status - alert, oriented to person, place, and time  EYE-PERRL, EOMI  ENT-ENT exam normal, no neck nodes or sinus tenderness  Neck - supple, no significant adenopathy   Chest - clear to auscultation, no wheezes, rales or rhonchi  Heart - normal rate, regular rhythm, no murmurs  Abdomen - soft, nontender, nondistended, no organomegaly  Ext-peripheral pulses normal, no pedal edema  Neuro - no focal findings     Results for orders placed or performed in visit on 09/15/20   [facility] SCREEN   Result Value Ref Range    Glucose 92 65 - 100 mg/dL    Cholesterol, total 168 <200 MG/DL    HDL Cholesterol 77 MG/DL    LDL, calculated 82 0 - 100 MG/DL    Triglyceride 45 <150 MG/DL     Assessment/Plan:  Diagnoses and all orders for this visit:    Pre-op evaluation      Patient Instructions   Based on history and physical exam patient stable for scheduled procedure    Follow-up and Dispositions    · Return if symptoms worsen or fail to improve, for post surgery follow up.

## 2020-10-14 DIAGNOSIS — B36.0 TINEA VERSICOLOR: ICD-10-CM

## 2020-10-14 RX ORDER — CLOTRIMAZOLE AND BETAMETHASONE DIPROPIONATE 10; .5 MG/ML; MG/ML
LOTION TOPICAL 2 TIMES DAILY
Qty: 30 ML | Refills: 3 | Status: SHIPPED | OUTPATIENT
Start: 2020-10-14 | End: 2021-07-07 | Stop reason: SDUPTHER

## 2020-11-19 DIAGNOSIS — Z41.9 SURGERY, ELECTIVE: Primary | ICD-10-CM

## 2020-11-19 DIAGNOSIS — K04.7 DENTAL ABSCESS: ICD-10-CM

## 2020-11-19 RX ORDER — PENICILLIN V POTASSIUM 500 MG/1
500 TABLET, FILM COATED ORAL 3 TIMES DAILY
Qty: 30 TAB | Refills: 1 | Status: SHIPPED | OUTPATIENT
Start: 2020-11-19 | End: 2020-11-29

## 2020-11-19 RX ORDER — CLINDAMYCIN HYDROCHLORIDE 300 MG/1
300 CAPSULE ORAL 3 TIMES DAILY
Qty: 30 CAP | Refills: 1 | Status: SHIPPED | OUTPATIENT
Start: 2020-11-19 | End: 2020-11-29

## 2021-03-05 DIAGNOSIS — K04.7 DENTAL ABSCESS: Primary | ICD-10-CM

## 2021-03-05 RX ORDER — AMOXICILLIN 500 MG/1
500 CAPSULE ORAL 3 TIMES DAILY
Qty: 30 CAP | Refills: 0 | Status: SHIPPED | OUTPATIENT
Start: 2021-03-05 | End: 2021-03-15

## 2021-05-02 RX ORDER — DOXYCYCLINE 100 MG/1
100 CAPSULE ORAL 2 TIMES DAILY
Qty: 14 CAP | Refills: 0 | Status: SHIPPED | OUTPATIENT
Start: 2021-05-02 | End: 2021-05-09

## 2021-05-24 DIAGNOSIS — Z12.31 ENCOUNTER FOR SCREENING MAMMOGRAM FOR BREAST CANCER: Primary | ICD-10-CM

## 2021-06-01 ENCOUNTER — HOSPITAL ENCOUNTER (OUTPATIENT)
Dept: MAMMOGRAPHY | Age: 56
Discharge: HOME | End: 2021-06-01
Attending: INTERNAL MEDICINE
Payer: COMMERCIAL

## 2021-06-01 DIAGNOSIS — Z12.31 ENCOUNTER FOR SCREENING MAMMOGRAM FOR BREAST CANCER: ICD-10-CM

## 2021-06-01 PROCEDURE — 77063 BREAST TOMOSYNTHESIS BI: CPT

## 2021-06-02 ENCOUNTER — TRANSCRIBE ORDER (OUTPATIENT)
Dept: GENERAL RADIOLOGY | Age: 56
End: 2021-06-02

## 2021-06-02 DIAGNOSIS — R92.8 ABNORMAL MAMMOGRAM: Primary | ICD-10-CM

## 2021-06-14 ENCOUNTER — TELEPHONE (OUTPATIENT)
Dept: FAMILY MEDICINE CLINIC | Age: 56
End: 2021-06-14

## 2021-06-14 ENCOUNTER — HOSPITAL ENCOUNTER (OUTPATIENT)
Dept: MAMMOGRAPHY | Age: 56
Discharge: HOME | End: 2021-06-14
Attending: INTERNAL MEDICINE
Payer: COMMERCIAL

## 2021-06-14 DIAGNOSIS — R92.8 ABNORMAL MAMMOGRAM: ICD-10-CM

## 2021-06-14 PROCEDURE — 76642 ULTRASOUND BREAST LIMITED: CPT

## 2021-06-14 PROCEDURE — 77065 DX MAMMO INCL CAD UNI: CPT

## 2021-06-14 NOTE — TELEPHONE ENCOUNTER
Brittany Carrion, from [facility] called regarding patient. Mass in left breast, and needs a needle biopsy. Please call @[phone]. There will be an order in [place] that needs to be signed.

## 2021-06-14 NOTE — PROGRESS NOTES
I spoke with Mukesh Morrow office and a PCP will sign the order in [address] [place] [address].   [email or web address]

## 2021-06-21 ENCOUNTER — HOSPITAL ENCOUNTER (OUTPATIENT)
Dept: MAMMOGRAPHY | Age: 56
Discharge: HOME | End: 2021-06-21
Attending: INTERNAL MEDICINE
Payer: COMMERCIAL

## 2021-06-21 ENCOUNTER — HOSPITAL ENCOUNTER (OUTPATIENT)
Dept: MAMMOGRAPHY | Age: 56
Discharge: HOME | End: 2021-06-21
Attending: FAMILY MEDICINE
Payer: COMMERCIAL

## 2021-06-21 DIAGNOSIS — R92.8 ABNORMAL MAMMOGRAM: ICD-10-CM

## 2021-06-21 PROCEDURE — 74011000250 HC RX REV CODE- 250: Performed by: RADIOLOGY

## 2021-06-21 PROCEDURE — A4648 IMPLANTABLE TISSUE MARKER: HCPCS

## 2021-06-21 PROCEDURE — 77065 DX MAMMO INCL CAD UNI: CPT

## 2021-06-21 PROCEDURE — 88342 IMHCHEM/IMCYTCHM 1ST ANTB: CPT

## 2021-06-21 PROCEDURE — 88360 TUMOR IMMUNOHISTOCHEM/MANUAL: CPT

## 2021-06-21 PROCEDURE — 88305 TISSUE EXAM BY PATHOLOGIST: CPT

## 2021-06-21 RX ORDER — LIDOCAINE HYDROCHLORIDE AND EPINEPHRINE 10; 10 MG/ML; UG/ML
20 INJECTION, SOLUTION INFILTRATION; PERINEURAL ONCE
Status: COMPLETED | OUTPATIENT
Start: 2021-06-21 | End: 2021-06-21

## 2021-06-21 RX ORDER — LIDOCAINE HYDROCHLORIDE 10 MG/ML
5 INJECTION INFILTRATION; PERINEURAL
Status: COMPLETED | OUTPATIENT
Start: 2021-06-21 | End: 2021-06-21

## 2021-06-21 RX ADMIN — LIDOCAINE HYDROCHLORIDE 5 ML: 10 INJECTION, SOLUTION INFILTRATION; PERINEURAL at 08:20

## 2021-06-21 RX ADMIN — LIDOCAINE HYDROCHLORIDE,EPINEPHRINE BITARTRATE 200 MG: 10; .01 INJECTION, SOLUTION INFILTRATION; PERINEURAL at 08:20

## 2021-06-21 NOTE — PROGRESS NOTES
Patient tolerated left breast biopsy well with scant bleeding. Biopsy site was bandaged in the usual fashion and discharge instructions were reviewed with the patient. She was provided with a written copy as well. Advised patient that results should be available by Wednesday and that she will receive a phone call in the afternoon. Encouraged her to call with any questions or concerns.

## 2021-06-23 NOTE — PROGRESS NOTES
Pt aware of results already - invasive ductal carcinoma, favor grade 2. ER/TX+, Her2/danilo neg. Has f/u with Dr. Rony Chan already.

## 2021-06-24 ENCOUNTER — TRANSCRIBE ORDER (OUTPATIENT)
Dept: SCHEDULING | Age: 56
End: 2021-06-24

## 2021-06-24 DIAGNOSIS — C50.412 MALIGNANT NEOPLASM OF UPPER-OUTER QUADRANT OF LEFT FEMALE BREAST (HCC): ICD-10-CM

## 2021-06-24 DIAGNOSIS — N63.21 UNSPECIFIED LUMP IN THE LEFT BREAST, UPPER OUTER QUADRANT: Primary | ICD-10-CM

## 2021-06-24 NOTE — PROGRESS NOTES
Dr. Isac Bean called patient yesterday with pathology. She expresses no concerns with biopsy site. Patient reports that she has already met with an oncologist and will set up an appointment with a breast surgeon herself. Noted an appointment in her chart scheduled with Dr. Ubaldo Younger 6/25.

## 2021-06-25 ENCOUNTER — OFFICE VISIT (OUTPATIENT)
Dept: SURGERY | Age: 56
End: 2021-06-25
Payer: COMMERCIAL

## 2021-06-25 ENCOUNTER — NURSE NAVIGATOR (OUTPATIENT)
Dept: CASE MANAGEMENT | Age: 56
End: 2021-06-25

## 2021-06-25 ENCOUNTER — DOCUMENTATION ONLY (OUTPATIENT)
Dept: SURGERY | Age: 56
End: 2021-06-25

## 2021-06-25 VITALS
SYSTOLIC BLOOD PRESSURE: 105 MMHG | DIASTOLIC BLOOD PRESSURE: 85 MMHG | WEIGHT: 145 LBS | BODY MASS INDEX: 24.75 KG/M2 | HEIGHT: 64 IN

## 2021-06-25 DIAGNOSIS — Z17.0 MALIGNANT NEOPLASM OF UPPER-OUTER QUADRANT OF LEFT BREAST IN FEMALE, ESTROGEN RECEPTOR POSITIVE (HCC): Primary | ICD-10-CM

## 2021-06-25 DIAGNOSIS — C50.412 MALIGNANT NEOPLASM OF UPPER-OUTER QUADRANT OF LEFT BREAST IN FEMALE, ESTROGEN RECEPTOR POSITIVE (HCC): Primary | ICD-10-CM

## 2021-06-25 PROCEDURE — 99205 OFFICE O/P NEW HI 60 MIN: CPT | Performed by: SURGERY

## 2021-06-25 NOTE — H&P (VIEW-ONLY)
HISTORY OF PRESENT ILLNESS  Trey Russell is a 61 y.o. female. HPI  NEW patient consult referred by  Dr. Claude Crutch for LEFT breast cancer. Denies palpable lumps. Found on screening mammogram. Denies pain. 21 - LEFT breast bx:  LEFT breast IDC, gr2,  ER 99%, MN 80%, HER2 neg, ki-67 30%    Family History:  Sister, breast cancer, dx at 61, survivor      Mammogram, 21, BIRADS 4c - 1.2 X 2.0 X 1.6 cm LEFT breast mass uoq    Past Medical History:   Diagnosis Date    Eczema     Ventral hernia 2014     Past Surgical History:   Procedure Laterality Date    HX  SECTION      HX GYN      fibroid sx    HX GYN      c section    HX MYOMECTOMY   &      Social History     Socioeconomic History    Marital status:      Spouse name: Not on file    Number of children: Not on file    Years of education: Not on file    Highest education level: Not on file   Occupational History    Not on file   Tobacco Use    Smoking status: Never Smoker    Smokeless tobacco: Never Used   Substance and Sexual Activity    Alcohol use: No    Drug use: No    Sexual activity: Yes     Partners: Male   Other Topics Concern    Not on file   Social History Narrative    Not on file     Social Determinants of Health     Financial Resource Strain:     Difficulty of Paying Living Expenses:    Food Insecurity:     Worried About [address] in the Last Year:     [address] in the Last Year:    Transportation Needs:     Lack of Transportation (Medical):      Lack of Transportation (Non-Medical):    Physical Activity:     Days of Exercise per Week:     Minutes of Exercise per Session:    Stress:     Feeling of Stress :    Social Connections:     Frequency of Communication with Friends and Family:     Frequency of Social Gatherings with Friends and Family:     Attends [employer] Services:     Active Member of Clubs or Organizations:     Attends Club or Organization Meetings:  Marital Status:    Intimate Partner Violence:     Fear of Current or Ex-Partner:     Emotionally Abused:     Physically Abused:     Sexually Abused:      OB History    No obstetric history on file. Obstetric Comments   Menarche 14, LMP 2016, # of children 1, age of 4st delivery 51, Hysterectomy/oophorectomy NO/NO, Breast bx NO, history of breast feeding NO, BCP NO, Hormone therapy NO             Current Outpatient Medications:     ketorolac (ACULAR) 0.5 % ophthalmic solution, , Disp: , Rfl:     multivitamin (ONE A DAY) tablet, Take 1 Tab by mouth daily. , Disp: , Rfl:     calcium-vitamin D (OYSTER SHELL) 500 mg(1,250mg) -200 unit per tablet, Take 1 Tab by mouth daily. , Disp: , Rfl:     clotrimazole-betamethasone (LOTRISONE) 1-0.05 % lotion, Apply  to affected area two (2) times a day., Disp: 30 mL, Rfl: 3    moxifloxacin (VIGAMOX) 0.5 % ophthalmic solution, , Disp: , Rfl:     prednisoLONE acetate (PRED FORTE) 1 % ophthalmic suspension, , Disp: , Rfl:   No Known Allergies    Review of Systems   All other systems reviewed and are negative. Physical Exam  Vitals and nursing note reviewed. Constitutional:       Appearance: She is well-developed. HENT:      Head: Normocephalic. Neck:      Thyroid: No thyromegaly. Cardiovascular:      Rate and Rhythm: Normal rate and regular rhythm. Heart sounds: Normal heart sounds. Pulmonary:      Effort: Pulmonary effort is normal.      Breath sounds: Normal breath sounds. Chest:      Breasts: Breasts are symmetrical.         Right: No inverted nipple, mass, nipple discharge, skin change or tenderness. Left: No inverted nipple, mass, nipple discharge, skin change or tenderness. Abdominal:      Palpations: Abdomen is soft. Musculoskeletal:         General: Normal range of motion. Cervical back: Neck supple. Lymphadenopathy:      Cervical: No cervical adenopathy.       Upper Body:      Right upper body: No supraclavicular, axillary or pectoral adenopathy. Left upper body: No supraclavicular, axillary or pectoral adenopathy. Skin:     General: Skin is warm and dry. Neurological:      Mental Status: She is alert and oriented to person, place, and time. BREAST ULTRASOUND, Preop planning  Indication:preop planning  left Breast    Technique: The area was scanned using a high-frequency linear-array near-field transducer  Findings: 2 cm  irregular mass with dropout  Impression: Biopsy site visible with ultrasound  Disposition:  Will schedule breast mri    ASSESSMENT and PLAN    ICD-10-CM ICD-9-CM    1. Malignant neoplasm of upper-outer quadrant of left breast in female, estrogen receptor positive ([facility] [address]  C50.412 174.4     Z17.0 V86.0      60 yo female with T1-T2 N0  IDC, gr2,  ER 99%, FL 80%, HER2 neg, ki-67 30%  I have reviewed the imaging and pathology with her and she was given copies of these reports. 90 minutes were spent face-to-face with the patient during this encounter and 90% of that time was spent on counseling and coordination of care. 1. Discussed lumpectomy and radiation vs mastectomy. Discussed reconstruction. MRI ordered to see if patient is a candidate for a lumpectomy. 2. Discussed sentinel lymph node biopsy. 3. Discussed external beam radiation. 4. Discussed hormone therapy. 5. Discussed the possibility of chemotherapy.    6. Discussed genetic testing    Will send oncotype per med onc  Genetic testing   I will call her after mri

## 2021-06-25 NOTE — PROGRESS NOTES
[address]  Breast Navigator Encounter    Name:    Malick Garces  Age:    61 y.o. Diagnosis:     LEFT breast cancer    Interdisciplinary Team:  Med-Onc:    Surg-Onc:    Dr. Christin Noriega:    Plastics:    :     Nurse Navigator:  Julianna Kilgore RN, BSN, [facility]      Encounter type:  []Patient Initiated  []Navigator Follow-up []Pre-op  []Post-op  [x]Check-in Prior to First Treatment []Treatment Modality Change  []Survivorship Transition []Other:       Narrative:    Met patient in office today while she was here for her breast talk. Discussed healthy diet. She is already scheduled for her MRI on 6/28. Provided the patient with my contact information and discussed my role in her care. Will continue to follow patient throughout  the care continuum.             Referrals/Handouts:            Julianna Kilgore RN, BSN, [facility]  Oncology Breast Navigator     [address]  [address] [place] [address] [facility]  W: [phone]  F: [phone]  [email or web address]  Good Help to Those in [facility]

## 2021-06-25 NOTE — LETTER
6/28/2021    Patient: Eddie Watts   YOB: 1965   Date of Visit: 6/25/2021     Demetrio Mcgill, [address] [place] [address]  [address]  [facility] [address].  [facility]    Dear Demetrio Mcgill MD,      Thank you for referring Ms. Vicenta Olvera to [address] [place] [address] for evaluation. My notes for this consultation are attached. If you have questions, please do not hesitate to call me. I look forward to following your patient along with you.       Sincerely,    Musa Chairez MD

## 2021-06-25 NOTE — PROGRESS NOTES
HISTORY OF PRESENT ILLNESS  Louis Medina is a 61 y.o. female. HPI  NEW patient consult referred by  Dr. Lashon Tompkins for LEFT breast cancer. Denies palpable lumps. Found on screening mammogram. Denies pain. 20 - LEFT breast bx:  LEFT breast IDC, gr2,  ER 99%, [place] 80%, HER2 neg, ki-67 30%    Family History:  Sister, breast cancer, dx at 61, survivor      Mammogram, 21, BIRADS 4c - 1.2 X 2.0 X 1.6 cm LEFT breast mass uoq    Past Medical History:   Diagnosis Date    Eczema     Ventral hernia 2014     Past Surgical History:   Procedure Laterality Date    HX  SECTION      HX GYN      fibroid sx    HX GYN      c section    HX MYOMECTOMY   &      Social History     Socioeconomic History    Marital status:      Spouse name: Not on file    Number of children: Not on file    Years of education: Not on file    Highest education level: Not on file   Occupational History    Not on file   Tobacco Use    Smoking status: Never Smoker    Smokeless tobacco: Never Used   Substance and Sexual Activity    Alcohol use: No    Drug use: No    Sexual activity: Yes     Partners: Male   Other Topics Concern    Not on file   Social History Narrative    Not on file     Social Determinants of Health     Financial Resource Strain:     Difficulty of Paying Living Expenses:    Food Insecurity:     Worried About [address] in the Last Year:     [address] in the Last Year:    Transportation Needs:     Lack of Transportation (Medical):      Lack of Transportation (Non-Medical):    Physical Activity:     Days of Exercise per Week:     Minutes of Exercise per Session:    Stress:     Feeling of Stress :    Social Connections:     Frequency of Communication with Friends and Family:     Frequency of Social Gatherings with Friends and Family:     Attends Mosque Services:     Active Member of Clubs or Organizations:     Attends Club or Organization Meetings:  Marital Status:    Intimate Partner Violence:     Fear of Current or Ex-Partner:     Emotionally Abused:     Physically Abused:     Sexually Abused:      OB History    No obstetric history on file. Obstetric Comments   Menarche 14, LMP 2016, # of children 1, age of 4st delivery 51, Hysterectomy/oophorectomy NO/NO, Breast bx NO, history of breast feeding NO, BCP NO, Hormone therapy NO             Current Outpatient Medications:     ketorolac (ACULAR) 0.5 % ophthalmic solution, , Disp: , Rfl:     multivitamin (ONE A DAY) tablet, Take 1 Tab by mouth daily. , Disp: , Rfl:     calcium-vitamin D (OYSTER SHELL) 500 mg(1,250mg) -200 unit per tablet, Take 1 Tab by mouth daily. , Disp: , Rfl:     clotrimazole-betamethasone (LOTRISONE) 1-0.05 % lotion, Apply  to affected area two (2) times a day., Disp: 30 mL, Rfl: 3    moxifloxacin (VIGAMOX) 0.5 % ophthalmic solution, , Disp: , Rfl:     prednisoLONE acetate (PRED FORTE) 1 % ophthalmic suspension, , Disp: , Rfl:   No Known Allergies    Review of Systems   All other systems reviewed and are negative. Physical Exam  Vitals and nursing note reviewed. Constitutional:       Appearance: She is well-developed. HENT:      Head: Normocephalic. Neck:      Thyroid: No thyromegaly. Cardiovascular:      Rate and Rhythm: Normal rate and regular rhythm. Heart sounds: Normal heart sounds. Pulmonary:      Effort: Pulmonary effort is normal.      Breath sounds: Normal breath sounds. Chest:      Breasts: Breasts are symmetrical.         Right: No inverted nipple, mass, nipple discharge, skin change or tenderness. Left: No inverted nipple, mass, nipple discharge, skin change or tenderness. Abdominal:      Palpations: Abdomen is soft. Musculoskeletal:         General: Normal range of motion. Cervical back: Neck supple. Lymphadenopathy:      Cervical: No cervical adenopathy.       Upper Body:      Right upper body: No supraclavicular, axillary or pectoral adenopathy. Left upper body: No supraclavicular, axillary or pectoral adenopathy. Skin:     General: Skin is warm and dry. Neurological:      Mental Status: She is alert and oriented to person, place, and time. BREAST ULTRASOUND, Preop planning  Indication:preop planning  left Breast    Technique: The area was scanned using a high-frequency linear-array near-field transducer  Findings: 2 cm  irregular mass with dropout  Impression: Biopsy site visible with ultrasound  Disposition:  Will schedule breast mri    ASSESSMENT and PLAN    ICD-10-CM ICD-9-CM    1. Malignant neoplasm of upper-outer quadrant of left breast in female, estrogen receptor positive ([facility] [address]  C50.412 174.4     Z17.0 V86.0      60 yo female with T1-T2 N0  IDC, gr2,  ER 99%, [place] 80%, HER2 neg, ki-67 30%  I have reviewed the imaging and pathology with her and she was given copies of these reports. 90 minutes were spent face-to-face with the patient during this encounter and 90% of that time was spent on counseling and coordination of care. 1. Discussed lumpectomy and radiation vs mastectomy. Discussed reconstruction. MRI ordered to see if patient is a candidate for a lumpectomy. 2. Discussed sentinel lymph node biopsy. 3. Discussed external beam radiation. 4. Discussed hormone therapy. 5. Discussed the possibility of chemotherapy.    6. Discussed genetic testing    Will send oncotype per med onc  Genetic testing   I will call her after mri

## 2021-06-25 NOTE — PATIENT INSTRUCTIONS
Learning About Breast Cancer Treatments  Your Care Instructions     Breast cancer means abnormal cells grow out of control in one or both breasts. These cancer cells can spread from the breast to nearby lymph nodes and other tissues. They can also spread to other parts of the body. The type and stage of cancer you have is based on:  · Where in the breast the cancer started. · The genetics of those cancer cells. · How far cancer has spread within the breast, to nearby tissues, or to other organs. · What the cancer cells look like under a microscope. · Whether there is cancer in the nearby lymph nodes. Tests that help find the stage of your cancer can help choose the right treatment for you. These tests can include a breast biopsy, lymph node biopsy, blood tests, and X-rays. You may need other tests as well, such as a bone, CT, or MRI scan. Whether you have more tests depends on your symptoms and the stage of the cancer. When you find out that you have cancer, you may feel many emotions and may need some help coping. Seek out family, friends, and counselors for support. You also can do things at home to make yourself feel better while you go through treatment. Call the [employer] ([phone]) or visit its website at [address] for more information. How is breast cancer treated? Your doctor may combine treatments. This is a common way to treat breast cancer. Treatment depends on what type and stage of cancer you have. You may have:  · Surgery to remove the cancer. · Radiation. This uses high-dose X-rays to kill cancer cells and shrink tumors. · Chemotherapy. This uses medicine to kill cancer cells. · Hormone therapy. This uses medicines such as tamoxifen. It limits the effect of the hormone estrogen. This hormone can help some types of breast cancer cells to grow. · Targeted therapy.  This uses medicines such as trastuzumab (Herceptin) to help your immune system fight the cancer. What surgeries are done for breast cancer? Surgery is a key part of treatment for breast cancer. The main types of surgeries are:  · Breast-conserving surgery, such as lumpectomy. It removes the cancer in the breast and just enough tissue to make sure that all of the cancer was removed. · Surgery to remove the breast. This includes:  ? Total mastectomy. This removes the whole breast.  ? Nipple-sparing mastectomy. This removes the whole breast but leaves the nipple. ? Modified radical mastectomy. This removes the whole breast and the lymph nodes under the arm (axillary lymph nodes). ? Radical mastectomy. This removes the whole breast, the chest muscles, and all the lymph nodes under the arm. If lymph nodes under the arm are removed, they are looked at under the microscope to check for cancer. There are two types of lymph node removal:  · [place] lymph node biopsy removes the lymph node that is the first to receive lymph fluid from the tumor. If cancer has spread from the breast to the lymph nodes, cancer cells most often show up in the sentinel node first.  · Axillary lymph node dissection removes most of the lymph nodes in the armpit. The type of surgery you have depends on the size, location, and type of the cancer. It also depends on your overall health and personal preferences. Even if your doctor removes all the cancer that can be seen at the time of your surgery, you may still need more treatment. You may get radiation, chemotherapy, or hormone therapy after surgery to try to kill any cancer cells that may be left. No matter what kind of surgery you have, you will get information about why you are having it, what its risks are, how to prepare, and what to do after surgery. Follow-up care is a key part of your treatment and safety. Be sure to make and go to all appointments, and call your doctor if you are having problems.  It's also a good idea to know your test results and keep a list of the medicines you take. Where can you learn more? Go to http://www.gray.com/  Enter X810 in the search box to learn more about \"Learning About Breast Cancer Treatments. \"  Current as of: December 17, 2020               Content Version: 12.8  © 6001-3576 Healthwise, [employer]. Care instructions adapted under license by [employer] (which disclaims liability or warranty for this information). If you have questions about a medical condition or this instruction, always ask your healthcare professional. [address] any warranty or liability for your use of this information.

## 2021-06-28 ENCOUNTER — HOSPITAL ENCOUNTER (OUTPATIENT)
Dept: MRI IMAGING | Age: 56
Discharge: HOME | End: 2021-06-28
Attending: INTERNAL MEDICINE
Payer: COMMERCIAL

## 2021-06-28 VITALS — BODY MASS INDEX: 24.59 KG/M2 | WEIGHT: 141 LBS

## 2021-06-28 DIAGNOSIS — C50.412 MALIGNANT NEOPLASM OF UPPER-OUTER QUADRANT OF LEFT FEMALE BREAST (HCC): ICD-10-CM

## 2021-06-28 DIAGNOSIS — N63.21 UNSPECIFIED LUMP IN THE LEFT BREAST, UPPER OUTER QUADRANT: ICD-10-CM

## 2021-06-28 PROCEDURE — 77049 MRI BREAST C-+ W/CAD BI: CPT

## 2021-06-28 PROCEDURE — A9585 GADOBUTROL INJECTION: HCPCS | Performed by: INTERNAL MEDICINE

## 2021-06-28 PROCEDURE — 74011250636 HC RX REV CODE- 250/636: Performed by: INTERNAL MEDICINE

## 2021-06-28 RX ADMIN — GADOBUTROL 6.5 ML: 604.72 INJECTION INTRAVENOUS at 14:20

## 2021-06-30 ENCOUNTER — HOSPITAL ENCOUNTER (OUTPATIENT)
Dept: CT IMAGING | Age: 56
Discharge: HOME | End: 2021-06-30
Attending: INTERNAL MEDICINE
Payer: COMMERCIAL

## 2021-06-30 DIAGNOSIS — N63.21 UNSPECIFIED LUMP IN THE LEFT BREAST, UPPER OUTER QUADRANT: ICD-10-CM

## 2021-06-30 DIAGNOSIS — C50.412 MALIGNANT NEOPLASM OF UPPER-OUTER QUADRANT OF LEFT FEMALE BREAST (HCC): ICD-10-CM

## 2021-06-30 PROCEDURE — 74177 CT ABD & PELVIS W/CONTRAST: CPT

## 2021-06-30 PROCEDURE — 74011000636 HC RX REV CODE- 636: Performed by: RADIOLOGY

## 2021-06-30 RX ADMIN — IOPAMIDOL 100 ML: 755 INJECTION, SOLUTION INTRAVENOUS at 10:44

## 2021-07-02 ENCOUNTER — NURSE NAVIGATOR (OUTPATIENT)
Dept: SURGERY | Age: 56
End: 2021-07-02

## 2021-07-02 NOTE — PROGRESS NOTES
[address]  Breast Navigator Encounter    Name:    Andrew Medellin  Age:    61 y.o. Diagnosis:     LEFT breast cancer, ER/NJ+/HER-2-    Interdisciplinary Team:  Med-Onc:    Dr. Diane Sanders  Surg-Onc:    Dr. Nancy Cao:    Plastics:    :     Nurse Navigator:  Nikolas Gilliam RN, BSN, CBCN      Encounter type:  [x]Patient Initiated  []Navigator Follow-up []Pre-op  []Post-op  []Check-in Prior to First Treatment []Treatment Modality Change  []Survivorship Transition []Other:       Narrative:    Returned patient's call today. She was asking about next steps. · Asked if she could swim this weekend. I told her she could. She is going to remove her steri-strips because they have not come off yet. · Is seeing her PCP next week for a physical.  Wants to know what labs/studies need to be done pre-op. I told her probably a CBC, CMP and EKG. I told her I can fax an order over to Dr. Vicente Cornejo office before her appt. Call in to PAT to see what labs she needs so I can put on order. · Is interested in a lumpectomy. Has heard from Dr. Shadi Bradley regarding her MRI and has results of her CT Scan, which was normal.  She is ready to proceed with surgery at [facility]. · Discussed nothing else to do pre-op. She may continue swimming and her other exercise. · Discussed post-op course. 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  Prefers [facility] for surgery since she mainly works at [employer]. · I will have Dr. Shadi Bradley follow-up with patient next week.         Referrals/Handouts:               Nikolas Gilliam, RN, BSN, [facility]  Oncology Breast Navigator     [address]  [address] [place], [place] [address]Libia huber  [address].  W: [phone]  F: [phone]  [email or web address]  Good Help to Those in [facility]

## 2021-07-06 ENCOUNTER — DOCUMENTATION ONLY (OUTPATIENT)
Dept: CASE MANAGEMENT | Age: 56
End: 2021-07-06

## 2021-07-06 ENCOUNTER — NURSE NAVIGATOR (OUTPATIENT)
Dept: CASE MANAGEMENT | Age: 56
End: 2021-07-06

## 2021-07-06 NOTE — PROGRESS NOTES
Per [facility] PAT, patient should have CBC, but since she has no DM or HTN, does not need a BMP or EKG pre-op. Has a physical scheduled today with her PCP.

## 2021-07-06 NOTE — PROGRESS NOTES
[address]  Breast Navigator Encounter    Name:    Ramesh Linda  Age:    61 y.o. Diagnosis:     LEFT breast cancer    Interdisciplinary Team:  Med-Onc:    Surg-Onc:    Dr. Rand Pérez:    Plastics:    :     Nurse Navigator:  Bruno Haji RN, BSN, CBCN      Encounter type:  [x]Patient Initiated  []Navigator Follow-up []Pre-op  []Post-op  []Check-in Prior to First Treatment []Treatment Modality Change  []Survivorship Transition []Other:       Narrative:    Calling about surgery date. Referred her to Riki Ingram, surgery scheduler. I let her know that I had called PAT and the only thing she needs pre-op is a CBC since she is healthy with no HTN or DM. I will continue to follow her.       Referrals/Handouts:            Bruno Haji RN, BSN, [facility]  Oncology Breast Navigator     [address]  [address] [facility] [address].  W: [phone]  F: [phone]  [email or web address]  Good Help to Those in [facility]

## 2021-07-07 ENCOUNTER — OFFICE VISIT (OUTPATIENT)
Dept: FAMILY MEDICINE CLINIC | Age: 56
End: 2021-07-07
Payer: COMMERCIAL

## 2021-07-07 ENCOUNTER — NURSE NAVIGATOR (OUTPATIENT)
Dept: SURGERY | Age: 56
End: 2021-07-07

## 2021-07-07 VITALS
TEMPERATURE: 98.3 F | RESPIRATION RATE: 20 BRPM | OXYGEN SATURATION: 99 % | HEART RATE: 69 BPM | SYSTOLIC BLOOD PRESSURE: 105 MMHG | HEIGHT: 64 IN | DIASTOLIC BLOOD PRESSURE: 66 MMHG | WEIGHT: 139 LBS | BODY MASS INDEX: 23.73 KG/M2

## 2021-07-07 DIAGNOSIS — Z13.220 SCREENING CHOLESTEROL LEVEL: ICD-10-CM

## 2021-07-07 DIAGNOSIS — Z17.0 MALIGNANT NEOPLASM OF UPPER-OUTER QUADRANT OF LEFT BREAST IN FEMALE, ESTROGEN RECEPTOR POSITIVE (HCC): Primary | ICD-10-CM

## 2021-07-07 DIAGNOSIS — Z13.29 SCREENING FOR THYROID DISORDER: ICD-10-CM

## 2021-07-07 DIAGNOSIS — C50.412 MALIGNANT NEOPLASM OF UPPER-OUTER QUADRANT OF LEFT BREAST IN FEMALE, ESTROGEN RECEPTOR POSITIVE (HCC): Primary | ICD-10-CM

## 2021-07-07 DIAGNOSIS — Z13.1 SCREENING FOR DIABETES MELLITUS (DM): ICD-10-CM

## 2021-07-07 DIAGNOSIS — H60.542 ECZEMA OF EXTERNAL EAR, LEFT: ICD-10-CM

## 2021-07-07 DIAGNOSIS — R79.89 LOW VITAMIN D LEVEL: ICD-10-CM

## 2021-07-07 DIAGNOSIS — Z00.00 WELLNESS EXAMINATION: Primary | ICD-10-CM

## 2021-07-07 DIAGNOSIS — Z01.818 PRE-OP EVALUATION: ICD-10-CM

## 2021-07-07 PROBLEM — N63.20 MASS OF LEFT BREAST: Status: ACTIVE | Noted: 2021-06-14

## 2021-07-07 PROBLEM — C50.419 MALIGNANT NEOPLASM OF UPPER-OUTER QUADRANT OF FEMALE BREAST (HCC): Status: ACTIVE | Noted: 2021-06-21

## 2021-07-07 PROCEDURE — 99396 PREV VISIT EST AGE 40-64: CPT | Performed by: INTERNAL MEDICINE

## 2021-07-07 PROCEDURE — 93000 ELECTROCARDIOGRAM COMPLETE: CPT | Performed by: INTERNAL MEDICINE

## 2021-07-07 RX ORDER — CLOTRIMAZOLE AND BETAMETHASONE DIPROPIONATE 10; .64 MG/G; MG/G
CREAM TOPICAL
Qty: 45 G | Refills: 1 | Status: SHIPPED | OUTPATIENT
Start: 2021-07-07

## 2021-07-07 NOTE — PATIENT INSTRUCTIONS
Well Visit, Ages 25 to 48: Care Instructions  Overview     Well visits can help you stay healthy. Your doctor has checked your overall health and may have suggested ways to take good care of yourself. Your doctor also may have recommended tests. At home, you can help prevent illness with healthy eating, regular exercise, and other steps. Follow-up care is a key part of your treatment and safety. Be sure to make and go to all appointments, and call your doctor if you are having problems. It's also a good idea to know your test results and keep a list of the medicines you take. How can you care for yourself at home? · Get screening tests that you and your doctor decide on. Screening helps find diseases before any symptoms appear. · Eat healthy foods. Choose fruits, vegetables, whole grains, protein, and low-fat dairy foods. Limit fat, especially saturated fat. Reduce salt in your diet. · Limit alcohol. If you are a man, have no more than 2 drinks a day or 14 drinks a week. If you are a woman, have no more than 1 drink a day or 7 drinks a week. · Get at least 30 minutes of physical activity on most days of the week. Walking is a good choice. You also may want to do other activities, such as running, swimming, cycling, or playing tennis or team sports. Discuss any changes in your exercise program with your doctor. · Reach and stay at a healthy weight. This will lower your risk for many problems, such as obesity, diabetes, heart disease, and high blood pressure. · Do not smoke or allow others to smoke around you. If you need help quitting, talk to your doctor about stop-smoking programs and medicines. These can increase your chances of quitting for good. · Care for your mental health. It is easy to get weighed down by worry and stress. Learn strategies to manage stress, like deep breathing and mindfulness, and stay connected with your family and community.  If you find you often feel sad or hopeless, talk with your doctor. Treatment can help. · Talk to your doctor about whether you have any risk factors for sexually transmitted infections (STIs). You can help prevent STIs if you wait to have sex with a new partner (or partners) until you've each been tested for STIs. It also helps if you use condoms (male or female condoms) and if you limit your sex partners to one person who only has sex with you. Vaccines are available for some STIs, such as HPV. · Use birth control if it's important to you to prevent pregnancy. Talk with your doctor about the choices available and what might be best for you. · If you think you may have a problem with alcohol or drug use, talk to your doctor. This includes prescription medicines (such as amphetamines and opioids) and illegal drugs (such as cocaine and methamphetamine). Your doctor can help you figure out what type of treatment is best for you. · Protect your skin from too much sun. When you're outdoors from 10 a.m. to 4 p.m., stay in the shade or cover up with clothing and a hat with a wide brim. Wear sunglasses that block UV rays. Even when it's cloudy, put broad-spectrum sunscreen (SPF 30 or higher) on any exposed skin. · See a dentist one or two times a year for checkups and to have your teeth cleaned. · Wear a seat belt in the car. When should you call for help? Watch closely for changes in your health, and be sure to contact your doctor if you have any problems or symptoms that concern you. Where can you learn more? Go to http://www.[employer].com/  Enter P072 in the search box to learn more about \"Well Visit, Ages 25 to 48: Care Instructions. \"  Current as of: May 27, 2020               Content Version: 12.8  © 0047-2206 Healthwise, [employer]. Care instructions adapted under license by [employer] (which disclaims liability or warranty for this information).  If you have questions about a medical condition or this instruction, always ask your healthcare professional. [place] [address] any warranty or liability for your use of this information.

## 2021-07-07 NOTE — PROGRESS NOTES
Chief Complaint   Patient presents with    Complete Physical     HPI:  Pola Veliz is a 61 y.o. female with significant medical history below who was recently diagnosed of malignant breast cancer of left outer upper quadrant presents for pre-op evaluation. Patient has a breast surgery scheduled with Dr. Horton Landau, exact date is not yet known. Blood pressure/vitals are stable. She has no other complaints. Review of Systems:  All ten systems review is negative    Past Medical History:   Diagnosis Date    Eczema     Ventral hernia 2014     Past Surgical History:   Procedure Laterality Date    HX  SECTION      HX GYN      fibroid sx    HX GYN      c section    HX MYOMECTOMY   &      Social History     Socioeconomic History    Marital status:      Spouse name: Not on file    Number of children: Not on file    Years of education: Not on file    Highest education level: Not on file   Tobacco Use    Smoking status: Never Smoker    Smokeless tobacco: Never Used   Substance and Sexual Activity    Alcohol use: No    Drug use: No    Sexual activity: Yes     Partners: Male     Social Determinants of Health     Financial Resource Strain:     Difficulty of Paying Living Expenses:    Food Insecurity:     Worried About Running Out of Food in the Last Year:     Ran Out of Food in the Last Year:    Transportation Needs:     Lack of Transportation (Medical):      Lack of Transportation (Non-Medical):    Physical Activity:     Days of Exercise per Week:     Minutes of Exercise per Session:    Stress:     Feeling of Stress :    Social Connections:     Frequency of Communication with Friends and Family:     Frequency of Social Gatherings with Friends and Family:     Attends Hindu Services:     Active Member of Clubs or Organizations:     Attends Club or Organization Meetings:     Marital Status:      Family History   Problem Relation Age of Onset    Hypertension Mother     Diabetes Father     Breast Cancer Sister 61     Current Outpatient Medications   Medication Sig Dispense Refill    multivitamin (ONE A DAY) tablet Take 1 Tab by mouth daily.  clotrimazole-betamethasone (LOTRISONE) 1-0.05 % lotion Apply  to affected area two (2) times a day. 30 mL 3     No Known Allergies    Objective:  Visit Vitals  /66   Pulse 69   Temp 98.3 °F (36.8 °C) (Oral)   Resp 20   Ht 5' 4\" (1.626 m)   Wt 139 lb (63 kg)   SpO2 99%   BMI 23.86 kg/m²     Physical Exam:   General appearance - alert, well appearing in no distress  Mental status - alert, oriented to person, place, and time  EYE-PERRL, EOMI  ENT-ENT exam normal, no neck nodes or sinus tenderness  Neck - supple, no significant adenopathy   Chest - clear to auscultation, no wheezes, rales or rhonchi  Heart - normal rate, regular rhythm, no murmurs  Abdomen - soft, nontender, nondistended, no organomegaly  Ext-peripheral pulses normal, no pedal edema  Skin-Warm and dry. no hyperpigmentation or suspicious lesions  Neuro -no focal findings   Back-full range of motion, no tenderness, palpable spasm or pain on motion     Assessment/Plan:  Diagnoses and all orders for this visit:    Wellness examination  -     METABOLIC PANEL, COMPREHENSIVE; Future  -     CBC W/O DIFF; Future    Pre-op evaluation  -     METABOLIC PANEL, COMPREHENSIVE; Future  -     CBC W/O DIFF; Future  -     AMB POC EKG ROUTINE W/ 12 LEADS, INTER & REP    Low vitamin D level  -     VITAMIN D, 25 HYDROXY; Future    Screening cholesterol level  -     LIPID PANEL; Future    Screening for thyroid disorder  -     TSH 3RD GENERATION; Future    Screening for diabetes mellitus (DM)  -     HEMOGLOBIN A1C WITH EAG;  Future    Eczema of external ear, left  -     clotrimazole-betamethasone (LOTRISONE) topical cream; Apply 2 times a day to affected area, Normal, Disp-45 g, R-1      Patient Instructions        Well Visit, Ages 25 to 48: Care Instructions  Overview     Well visits can help you stay healthy. Your doctor has checked your overall health and may have suggested ways to take good care of yourself. Your doctor also may have recommended tests. At home, you can help prevent illness with healthy eating, regular exercise, and other steps. Follow-up care is a key part of your treatment and safety. Be sure to make and go to all appointments, and call your doctor if you are having problems. It's also a good idea to know your test results and keep a list of the medicines you take. How can you care for yourself at home? · Get screening tests that you and your doctor decide on. Screening helps find diseases before any symptoms appear. · Eat healthy foods. Choose fruits, vegetables, whole grains, protein, and low-fat dairy foods. Limit fat, especially saturated fat. Reduce salt in your diet. · Limit alcohol. If you are a man, have no more than 2 drinks a day or 14 drinks a week. If you are a woman, have no more than 1 drink a day or 7 drinks a week. · Get at least 30 minutes of physical activity on most days of the week. Walking is a good choice. You also may want to do other activities, such as running, swimming, cycling, or playing tennis or team sports. Discuss any changes in your exercise program with your doctor. · Reach and stay at a healthy weight. This will lower your risk for many problems, such as obesity, diabetes, heart disease, and high blood pressure. · Do not smoke or allow others to smoke around you. If you need help quitting, talk to your doctor about stop-smoking programs and medicines. These can increase your chances of quitting for good. · Care for your mental health. It is easy to get weighed down by worry and stress. Learn strategies to manage stress, like deep breathing and mindfulness, and stay connected with your family and community. If you find you often feel sad or hopeless, talk with your doctor.  Treatment can help.  · Talk to your doctor about whether you have any risk factors for sexually transmitted infections (STIs). You can help prevent STIs if you wait to have sex with a new partner (or partners) until you've each been tested for STIs. It also helps if you use condoms (male or female condoms) and if you limit your sex partners to one person who only has sex with you. Vaccines are available for some STIs, such as HPV. · Use birth control if it's important to you to prevent pregnancy. Talk with your doctor about the choices available and what might be best for you. · If you think you may have a problem with alcohol or drug use, talk to your doctor. This includes prescription medicines (such as amphetamines and opioids) and illegal drugs (such as cocaine and methamphetamine). Your doctor can help you figure out what type of treatment is best for you. · Protect your skin from too much sun. When you're outdoors from 10 a.m. to 4 p.m., stay in the shade or cover up with clothing and a hat with a wide brim. Wear sunglasses that block UV rays. Even when it's cloudy, put broad-spectrum sunscreen (SPF 30 or higher) on any exposed skin. · See a dentist one or two times a year for checkups and to have your teeth cleaned. · Wear a seat belt in the car. When should you call for help? Watch closely for changes in your health, and be sure to contact your doctor if you have any problems or symptoms that concern you. Where can you learn more? Go to http://www.[employer].com/  Enter P072 in the search box to learn more about \"Well Visit, Ages 25 to 48: Care Instructions. \"  Current as of: May 27, 2020               Content Version: 12.8  © 5869-1445 Healthwise, Incorporated. Care instructions adapted under license by [employer] (which disclaims liability or warranty for this information).  If you have questions about a medical condition or this instruction, always ask your healthcare professional. [facility] any warranty or liability for your use of this information. Follow-up and Dispositions    · Return in about 1 year (around 7/7/2022), or if symptoms worsen or fail to improve, for routine follow up.

## 2021-07-07 NOTE — PERIOP NOTES
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

## 2021-07-08 NOTE — PROGRESS NOTES
[address]  Breast Navigator Encounter    Name:    Pam Murry  Age:    61 y.o. Diagnosis:   LEFT breast cancer    Interdisciplinary Team:  Med-Onc:    Dr. Ryland Garcia  Surg-Onc:    Dr. Hidalgo :    Plastics:    :     Nurse Navigator:  Noreen Strong RN, BSN, CBCN      Encounter type:  [x]Patient Initiated  []Navigator Follow-up []Pre-op  []Post-op  []Check-in Prior to First Treatment []Treatment Modality Change  []Survivorship Transition []Other:       Narrative:    Called patient to let her know that I had spoken to Elvie Moffett Donnanickolas Smith at [facility] PAT. She will not require any PAT pre-op, possibly a phone interview. She had a CBC, CMP and other labs at her PCP today, who is a [facility] physician. This should be ready by the date of her surgery. Asked if she was scheduled for surgery next week. I read her the details of the surgery next Tuesday 7/13. I let her know that I would have Mariia Church send a [employer] message with all the details to confirm what I had gone over with her.       Referrals/Handouts:            Noreen Strong RN, BSN, [facility]  Oncology Breast Navigator     [address]  [address] [place] [address] [place]  22.  W: [phone]  F: [phone]  [email or web address]  Good Help to Those in [facility]

## 2021-07-08 NOTE — PROGRESS NOTES
I called the patient at about 10:30 AM today. I was waiting for confirmation of her LEFT breast snbx. I left a voice message on her phone,  I also sent a message to her my chart.

## 2021-07-09 ENCOUNTER — DOCUMENTATION ONLY (OUTPATIENT)
Dept: FAMILY MEDICINE CLINIC | Age: 56
End: 2021-07-09

## 2021-07-09 ENCOUNTER — TELEPHONE (OUTPATIENT)
Dept: SURGERY | Age: 56
End: 2021-07-09

## 2021-07-09 ENCOUNTER — NURSE NAVIGATOR (OUTPATIENT)
Dept: CASE MANAGEMENT | Age: 56
End: 2021-07-09

## 2021-07-09 NOTE — PROGRESS NOTES
[address]  Breast Navigator Encounter      Attempted to return patient call. She had left a message that she had more questions about surgery. Since she was not available, I left a voicemail for her. I also let her know that she can fax her FMLA/disability paperwork to DR JOSEPH LAKHANI [facility] at [phone]. Someone will then call her to collect $25 payment for completion.               Marylou Charlton RN, BSN, [facility]  Oncology Breast Navigator     [facility] [address]  [address] [facility].  W: [phone]  F: [phone]  [email or web address]  Good Help to Those in [facility]

## 2021-07-09 NOTE — PROGRESS NOTES
[address]  Breast Navigator Encounter    Name:    Florin Bowen  Age:    61 y.o. Diagnosis:    LEFT breast cancer    Interdisciplinary Team:  Med-Onc:    Dr. Olsen Primus  Surg-Onc:    Dr. Conley Bi:    Plastics:    :     Nurse Navigator:  Maddie Dowell, RN, BSN, [facility]      Encounter type:  [x]Patient Initiated  []Navigator Follow-up []Pre-op  []Post-op  []Check-in Prior to First Treatment []Treatment Modality Change  []Survivorship Transition []Other:       Narrative:        · Discussed FMLA/disability. She has already taken care of this with [facility]. · Discussed what happens when she goes to nuclear medicine for lymphoscintigraphy. · Has taken two weeks off. I told her that was a good amount of time. Can take another week if needed. · Discussed what to wear to surgery. Recommended comfortable clothes that are easy to get off/on. Advised her that Dr. Yanni Pat may put her in a post-op bra, and then recommended a supportive bra after that. · Discussed that there are two incisions, one in the breast, one in the axilla. · Asked about next steps, and I told her Dr. Yanni Pat would call her with path when available, then would provide referral to radiation oncologist.      All of her questions were answered. I will continue to follow her.         Referrals/Handouts:            Maddie Dowell, RN, BSN, [facility]  Oncology Breast Navigator     [address]  [address] [facility].  W: [phone]  F: [phone]  [email or web address]  Good Help to Those in [facility]

## 2021-07-12 ENCOUNTER — TELEPHONE (OUTPATIENT)
Dept: SURGERY | Age: 56
End: 2021-07-12

## 2021-07-12 NOTE — TELEPHONE ENCOUNTER
Called to ask about time off from work. She plans to go back in two weeks, but disability paperwork gives her three weeks off. I told her she can go back after two weeks if she is ready, can take three if she needs to. I let her know that she does not need to be seen by Dr. Iniguez Daily before going back to work.

## 2021-07-13 ENCOUNTER — APPOINTMENT (OUTPATIENT)
Dept: NUCLEAR MEDICINE | Age: 56
End: 2021-07-13
Attending: SURGERY
Payer: COMMERCIAL

## 2021-07-13 ENCOUNTER — ANESTHESIA (OUTPATIENT)
Dept: MEDSURG UNIT | Age: 56
End: 2021-07-13
Payer: COMMERCIAL

## 2021-07-13 ENCOUNTER — ANESTHESIA EVENT (OUTPATIENT)
Dept: MEDSURG UNIT | Age: 56
End: 2021-07-13
Payer: COMMERCIAL

## 2021-07-13 ENCOUNTER — HOSPITAL ENCOUNTER (OUTPATIENT)
Age: 56
Setting detail: OUTPATIENT SURGERY
Discharge: HOME | End: 2021-07-13
Attending: SURGERY | Admitting: SURGERY
Payer: COMMERCIAL

## 2021-07-13 VITALS
TEMPERATURE: 97.7 F | BODY MASS INDEX: 23.73 KG/M2 | SYSTOLIC BLOOD PRESSURE: 122 MMHG | HEIGHT: 64 IN | DIASTOLIC BLOOD PRESSURE: 68 MMHG | OXYGEN SATURATION: 100 % | HEART RATE: 77 BPM | WEIGHT: 139 LBS | RESPIRATION RATE: 16 BRPM

## 2021-07-13 DIAGNOSIS — C50.412 MALIGNANT NEOPLASM OF UPPER-OUTER QUADRANT OF LEFT BREAST IN FEMALE, ESTROGEN RECEPTOR POSITIVE (HCC): ICD-10-CM

## 2021-07-13 DIAGNOSIS — Z17.0 MALIGNANT NEOPLASM OF UPPER-OUTER QUADRANT OF LEFT BREAST IN FEMALE, ESTROGEN RECEPTOR POSITIVE (HCC): ICD-10-CM

## 2021-07-13 PROCEDURE — 76030000003 HC AMB SURG OR TIME 1.5 TO 2: Performed by: SURGERY

## 2021-07-13 PROCEDURE — 77030002996 HC SUT SLK J&J -A: Performed by: SURGERY

## 2021-07-13 PROCEDURE — 76210000036 HC AMBSU PH I REC 1.5 TO 2 HR: Performed by: SURGERY

## 2021-07-13 PROCEDURE — 77030034626 HC LIGASURE SM JAW SEAL OPN SURG COVD -E: Performed by: SURGERY

## 2021-07-13 PROCEDURE — 77030011267 HC ELECTRD BLD COVD -A: Performed by: SURGERY

## 2021-07-13 PROCEDURE — 76060000063 HC AMB SURG ANES 1.5 TO 2 HR: Performed by: SURGERY

## 2021-07-13 PROCEDURE — 74011000250 HC RX REV CODE- 250: Performed by: NURSE ANESTHETIST, CERTIFIED REGISTERED

## 2021-07-13 PROCEDURE — 77030010509 HC AIRWY LMA MSK TELE -A: Performed by: ANESTHESIOLOGY

## 2021-07-13 PROCEDURE — 74011250636 HC RX REV CODE- 250/636: Performed by: ANESTHESIOLOGY

## 2021-07-13 PROCEDURE — 74011250637 HC RX REV CODE- 250/637: Performed by: ANESTHESIOLOGY

## 2021-07-13 PROCEDURE — 74011250636 HC RX REV CODE- 250/636: Performed by: SURGERY

## 2021-07-13 PROCEDURE — 77030041680 HC PNCL ELECSURG SMK EVAC CNMD -B: Performed by: SURGERY

## 2021-07-13 PROCEDURE — 74011250636 HC RX REV CODE- 250/636: Performed by: NURSE ANESTHETIST, CERTIFIED REGISTERED

## 2021-07-13 PROCEDURE — A9520 TC99 TILMANOCEPT DIAG 0.5MCI: HCPCS

## 2021-07-13 PROCEDURE — 88331 PATH CONSLTJ SURG 1 BLK 1SPC: CPT

## 2021-07-13 PROCEDURE — 2709999900 HC NON-CHARGEABLE SUPPLY: Performed by: SURGERY

## 2021-07-13 PROCEDURE — 77030019702 HC WRP THER MENM -C: Performed by: SURGERY

## 2021-07-13 PROCEDURE — 77030010507 HC ADH SKN DERMBND J&J -B: Performed by: SURGERY

## 2021-07-13 PROCEDURE — 88307 TISSUE EXAM BY PATHOLOGIST: CPT

## 2021-07-13 PROCEDURE — C9290 INJ, BUPIVACAINE LIPOSOME: HCPCS | Performed by: SURGERY

## 2021-07-13 PROCEDURE — 77030002933 HC SUT MCRYL J&J -A: Performed by: SURGERY

## 2021-07-13 PROCEDURE — 77030040361 HC SLV COMPR DVT MDII -B: Performed by: SURGERY

## 2021-07-13 PROCEDURE — 74011000250 HC RX REV CODE- 250: Performed by: SURGERY

## 2021-07-13 RX ORDER — ONDANSETRON 2 MG/ML
4 INJECTION INTRAMUSCULAR; INTRAVENOUS AS NEEDED
Status: DISCONTINUED | OUTPATIENT
Start: 2021-07-13 | End: 2021-07-13 | Stop reason: HOSPADM

## 2021-07-13 RX ORDER — MIDAZOLAM HYDROCHLORIDE 1 MG/ML
1 INJECTION, SOLUTION INTRAMUSCULAR; INTRAVENOUS AS NEEDED
Status: DISCONTINUED | OUTPATIENT
Start: 2021-07-13 | End: 2021-07-13 | Stop reason: HOSPADM

## 2021-07-13 RX ORDER — MIDAZOLAM HYDROCHLORIDE 1 MG/ML
0.5 INJECTION, SOLUTION INTRAMUSCULAR; INTRAVENOUS
Status: DISCONTINUED | OUTPATIENT
Start: 2021-07-13 | End: 2021-07-13 | Stop reason: HOSPADM

## 2021-07-13 RX ORDER — EPHEDRINE SULFATE/0.9% NACL/PF 50 MG/5 ML
SYRINGE (ML) INTRAVENOUS AS NEEDED
Status: DISCONTINUED | OUTPATIENT
Start: 2021-07-13 | End: 2021-07-13 | Stop reason: HOSPADM

## 2021-07-13 RX ORDER — HYDROMORPHONE HYDROCHLORIDE 1 MG/ML
0.2 INJECTION, SOLUTION INTRAMUSCULAR; INTRAVENOUS; SUBCUTANEOUS
Status: DISCONTINUED | OUTPATIENT
Start: 2021-07-13 | End: 2021-07-13 | Stop reason: HOSPADM

## 2021-07-13 RX ORDER — SODIUM CHLORIDE 0.9 % (FLUSH) 0.9 %
5-40 SYRINGE (ML) INJECTION AS NEEDED
Status: DISCONTINUED | OUTPATIENT
Start: 2021-07-13 | End: 2021-07-13 | Stop reason: HOSPADM

## 2021-07-13 RX ORDER — DIPHENHYDRAMINE HYDROCHLORIDE 50 MG/ML
12.5 INJECTION, SOLUTION INTRAMUSCULAR; INTRAVENOUS AS NEEDED
Status: DISCONTINUED | OUTPATIENT
Start: 2021-07-13 | End: 2021-07-13 | Stop reason: HOSPADM

## 2021-07-13 RX ORDER — SODIUM CHLORIDE 0.9 % (FLUSH) 0.9 %
5-40 SYRINGE (ML) INJECTION EVERY 8 HOURS
Status: DISCONTINUED | OUTPATIENT
Start: 2021-07-13 | End: 2021-07-13 | Stop reason: HOSPADM

## 2021-07-13 RX ORDER — MORPHINE SULFATE 2 MG/ML
2 INJECTION, SOLUTION INTRAMUSCULAR; INTRAVENOUS
Status: DISCONTINUED | OUTPATIENT
Start: 2021-07-13 | End: 2021-07-13 | Stop reason: HOSPADM

## 2021-07-13 RX ORDER — SODIUM CHLORIDE 9 MG/ML
1000 INJECTION, SOLUTION INTRAVENOUS CONTINUOUS
Status: DISCONTINUED | OUTPATIENT
Start: 2021-07-13 | End: 2021-07-13 | Stop reason: HOSPADM

## 2021-07-13 RX ORDER — ONDANSETRON 2 MG/ML
INJECTION INTRAMUSCULAR; INTRAVENOUS AS NEEDED
Status: DISCONTINUED | OUTPATIENT
Start: 2021-07-13 | End: 2021-07-13 | Stop reason: HOSPADM

## 2021-07-13 RX ORDER — OXYCODONE AND ACETAMINOPHEN 5; 325 MG/1; MG/1
1 TABLET ORAL
Qty: 30 TABLET | Refills: 0 | Status: SHIPPED | OUTPATIENT
Start: 2021-07-13 | End: 2021-07-20

## 2021-07-13 RX ORDER — MIDAZOLAM HYDROCHLORIDE 1 MG/ML
INJECTION, SOLUTION INTRAMUSCULAR; INTRAVENOUS AS NEEDED
Status: DISCONTINUED | OUTPATIENT
Start: 2021-07-13 | End: 2021-07-13 | Stop reason: HOSPADM

## 2021-07-13 RX ORDER — SODIUM CHLORIDE 9 MG/ML
50 INJECTION, SOLUTION INTRAVENOUS CONTINUOUS
Status: DISCONTINUED | OUTPATIENT
Start: 2021-07-13 | End: 2021-07-13 | Stop reason: HOSPADM

## 2021-07-13 RX ORDER — PROPOFOL 10 MG/ML
INJECTION, EMULSION INTRAVENOUS AS NEEDED
Status: DISCONTINUED | OUTPATIENT
Start: 2021-07-13 | End: 2021-07-13 | Stop reason: HOSPADM

## 2021-07-13 RX ORDER — EPHEDRINE SULFATE/0.9% NACL/PF 50 MG/5 ML
5 SYRINGE (ML) INTRAVENOUS AS NEEDED
Status: DISCONTINUED | OUTPATIENT
Start: 2021-07-13 | End: 2021-07-13 | Stop reason: HOSPADM

## 2021-07-13 RX ORDER — FENTANYL CITRATE 50 UG/ML
25 INJECTION, SOLUTION INTRAMUSCULAR; INTRAVENOUS
Status: DISCONTINUED | OUTPATIENT
Start: 2021-07-13 | End: 2021-07-13 | Stop reason: HOSPADM

## 2021-07-13 RX ORDER — DEXAMETHASONE SODIUM PHOSPHATE 4 MG/ML
INJECTION, SOLUTION INTRA-ARTICULAR; INTRALESIONAL; INTRAMUSCULAR; INTRAVENOUS; SOFT TISSUE AS NEEDED
Status: DISCONTINUED | OUTPATIENT
Start: 2021-07-13 | End: 2021-07-13 | Stop reason: HOSPADM

## 2021-07-13 RX ORDER — ACETAMINOPHEN 325 MG/1
650 TABLET ORAL ONCE
Status: COMPLETED | OUTPATIENT
Start: 2021-07-13 | End: 2021-07-13

## 2021-07-13 RX ORDER — ONDANSETRON 4 MG/1
4 TABLET, ORALLY DISINTEGRATING ORAL
Qty: 5 TABLET | Refills: 1 | Status: SHIPPED | OUTPATIENT
Start: 2021-07-13 | End: 2021-09-13 | Stop reason: ALTCHOICE

## 2021-07-13 RX ORDER — SODIUM CHLORIDE, SODIUM LACTATE, POTASSIUM CHLORIDE, CALCIUM CHLORIDE 600; 310; 30; 20 MG/100ML; MG/100ML; MG/100ML; MG/100ML
INJECTION, SOLUTION INTRAVENOUS
Status: DISCONTINUED | OUTPATIENT
Start: 2021-07-13 | End: 2021-07-13 | Stop reason: HOSPADM

## 2021-07-13 RX ORDER — OXYCODONE AND ACETAMINOPHEN 5; 325 MG/1; MG/1
1 TABLET ORAL AS NEEDED
Status: DISCONTINUED | OUTPATIENT
Start: 2021-07-13 | End: 2021-07-13 | Stop reason: HOSPADM

## 2021-07-13 RX ORDER — FENTANYL CITRATE 50 UG/ML
50 INJECTION, SOLUTION INTRAMUSCULAR; INTRAVENOUS AS NEEDED
Status: DISCONTINUED | OUTPATIENT
Start: 2021-07-13 | End: 2021-07-13 | Stop reason: HOSPADM

## 2021-07-13 RX ORDER — FENTANYL CITRATE 50 UG/ML
INJECTION, SOLUTION INTRAMUSCULAR; INTRAVENOUS AS NEEDED
Status: DISCONTINUED | OUTPATIENT
Start: 2021-07-13 | End: 2021-07-13 | Stop reason: HOSPADM

## 2021-07-13 RX ORDER — LIDOCAINE HYDROCHLORIDE 20 MG/ML
INJECTION, SOLUTION EPIDURAL; INFILTRATION; INTRACAUDAL; PERINEURAL AS NEEDED
Status: DISCONTINUED | OUTPATIENT
Start: 2021-07-13 | End: 2021-07-13 | Stop reason: HOSPADM

## 2021-07-13 RX ORDER — SODIUM CHLORIDE, SODIUM LACTATE, POTASSIUM CHLORIDE, CALCIUM CHLORIDE 600; 310; 30; 20 MG/100ML; MG/100ML; MG/100ML; MG/100ML
125 INJECTION, SOLUTION INTRAVENOUS CONTINUOUS
Status: DISCONTINUED | OUTPATIENT
Start: 2021-07-13 | End: 2021-07-13 | Stop reason: HOSPADM

## 2021-07-13 RX ORDER — LIDOCAINE HYDROCHLORIDE 10 MG/ML
0.1 INJECTION, SOLUTION EPIDURAL; INFILTRATION; INTRACAUDAL; PERINEURAL AS NEEDED
Status: DISCONTINUED | OUTPATIENT
Start: 2021-07-13 | End: 2021-07-13 | Stop reason: HOSPADM

## 2021-07-13 RX ADMIN — OXYCODONE HYDROCHLORIDE AND ACETAMINOPHEN 1 TABLET: 5; 325 TABLET ORAL at 17:32

## 2021-07-13 RX ADMIN — LIDOCAINE HYDROCHLORIDE 100 MG: 20 INJECTION, SOLUTION EPIDURAL; INFILTRATION; INTRACAUDAL; PERINEURAL at 14:44

## 2021-07-13 RX ADMIN — ONDANSETRON HYDROCHLORIDE 4 MG: 2 INJECTION, SOLUTION INTRAMUSCULAR; INTRAVENOUS at 14:49

## 2021-07-13 RX ADMIN — PROPOFOL 200 MG: 10 INJECTION, EMULSION INTRAVENOUS at 14:44

## 2021-07-13 RX ADMIN — FENTANYL CITRATE 25 MCG: 50 INJECTION, SOLUTION INTRAMUSCULAR; INTRAVENOUS at 14:44

## 2021-07-13 RX ADMIN — FENTANYL CITRATE 25 MCG: 50 INJECTION, SOLUTION INTRAMUSCULAR; INTRAVENOUS at 15:13

## 2021-07-13 RX ADMIN — ACETAMINOPHEN 650 MG: 325 TABLET ORAL at 13:55

## 2021-07-13 RX ADMIN — DEXAMETHASONE SODIUM PHOSPHATE 4 MG: 4 INJECTION, SOLUTION INTRAMUSCULAR; INTRAVENOUS at 14:49

## 2021-07-13 RX ADMIN — SODIUM CHLORIDE, POTASSIUM CHLORIDE, SODIUM LACTATE AND CALCIUM CHLORIDE 125 ML/HR: 600; 310; 30; 20 INJECTION, SOLUTION INTRAVENOUS at 13:55

## 2021-07-13 RX ADMIN — SODIUM CHLORIDE, POTASSIUM CHLORIDE, SODIUM LACTATE AND CALCIUM CHLORIDE: 600; 310; 30; 20 INJECTION, SOLUTION INTRAVENOUS at 14:38

## 2021-07-13 RX ADMIN — FENTANYL CITRATE 25 MCG: 50 INJECTION, SOLUTION INTRAMUSCULAR; INTRAVENOUS at 17:02

## 2021-07-13 RX ADMIN — FENTANYL CITRATE 25 MCG: 50 INJECTION, SOLUTION INTRAMUSCULAR; INTRAVENOUS at 15:29

## 2021-07-13 RX ADMIN — FENTANYL CITRATE 25 MCG: 50 INJECTION, SOLUTION INTRAMUSCULAR; INTRAVENOUS at 17:09

## 2021-07-13 RX ADMIN — MIDAZOLAM 2 MG: 1 INJECTION INTRAMUSCULAR; INTRAVENOUS at 14:38

## 2021-07-13 RX ADMIN — Medication 10 MG: at 15:56

## 2021-07-13 NOTE — BRIEF OP NOTE
Brief Postoperative Note    Patient: Pola Layer  YOB: 1965  MRN: [id number]    Date of Procedure: 7/13/2021     Pre-Op Diagnosis: LEFT BREAST CANCER    Post-Op Diagnosis: Same as preoperative diagnosis. Procedure(s):  LEFT BREAST LUMPECTOMY WITH ULTRASOUND/LEFT BREAST SENTINEL NODE BIOPSY (1200)    Surgeon(s):   Eddi Carlos MD    Surgical Assistant: Surg Asst-1: Caro Dorantes    Anesthesia: General     Estimated Blood Loss (mL): less than 50     Complications: None    Specimens:   ID Type Source Tests Collected by Time Destination   1 : LEFT BREAST SENTINAL NODE #1 Frozen Section Lymph Node  Eddi Carlos MD 7/13/2021 1357 Pathology   2 : LEFT BREAST LUMPECTOMY SHORT SUPERIOR LONG LATERAL S[place]ra Breast  Eddi Carlos MD 7/13/2021 1517 Pathology   3 : LEFT BREAST LUMPECTOMY LATERAL MARGIN STITCH ON NEW MARGIN Sondra Breast  Eddi Carlos MD 7/13/2021 1519 Pathology   4 : LEFT BREAST LUMPECTOMY SUPERIOR MARGIN STITCH ON NEW MARGIN Sondra Breast  Eddi Carlos MD 7/13/2021 1522 Pathology   5 : LEFT BREAST LUMPECTOMY MEDIAL MARGIN STITCH ON NEW MARGIN Sondra Breast  Eddi Carlos MD 7/13/2021 1523 Pathology   6 : LEFT BREAST LUMPECTOMY INFERIOR MARGIN [address] ON NEW MARGIN Sondra Breast  Eddi Carlos MD 7/13/2021 1523 Pathology   7 : LEFT BREAST LUMPECTOMY ANTERIOR MARGIN [address] ON NEW MARGIN Sondra Breast  Eddi Carlos MD 7/13/2021 1525 Pathology   8 : LEFT BREAST LUMPECTOMY DEEP MARGIN [address] ON NEW MARGIN Sondra Breast  Eddi Carlos MD 7/13/2021 1525 Pathology   9 : LEFT AXILLARY SENTINEL NODE #2 Fresh Lymph Node  Eddi Carlos MD 7/13/2021 1546 Pathology   10 : LEFT AXILLARY SENTINEL NODE #3 Fresh Lymph Node  Eddi Carlos MD 7/13/2021 1547 Pathology   11 : LEFT AXILLARY SENTINEL NODE #4 Fresh Lymph Node  Eddi Carlos MD 7/13/2021 1550 Pathology        Implants: * No implants in log *    Drains: * No LDAs found *    Findings: 1 sln + on frozen    Electronically Signed by Pasha Starr MD on 7/13/2021 at 4:04 PM  Dictated stat

## 2021-07-13 NOTE — DISCHARGE INSTRUCTIONS
Discharge Instructions from Dr. Enrrique Gilliam    · I will call you with the pathology results, typically within 1 week from today. · You may shower, but no hot tubs, swimming pools, or baths until your incision is healed. · No heavy lifting with the affected extremity (nothing greater than 5 pounds), and limit its use for the next 4-5 days. · You may use an ice pack for comfort for the next couple of days, but do not place ice directly on the skin. Rather, use a towel or clothing to serve as a barrier between skin and ice to prevent injury. · If I placed a drain, follow the drain instructions provided, especially as you keep a record of the drain output. · Follow medication instructions carefully. No aspirin, ibuprofen or aleve. May take tylenol instead of narcotic. · Wear surgical bra for 24 hours, then remove. Wear supportive bra at all times. · You will have bruising and swelling  · Watch for signs of infection as listed below. · Redness  · Swelling  · Drainage from the incision or from your nipple that appears infected  · Fever over 101.5 degrees for consecutive readings, or over 99.5 if you are currently undergoing chemotherapy. · Call our office (number is below) for a follow-up appointment. · If you have any problems, our phone number is [phone]                DISCHARGE SUMMARY from Nurse    PATIENT INSTRUCTIONS:    After general anesthesia or intravenous sedation, for 24 hours or while taking prescription Narcotics:  · Limit your activities  · Do not drive and operate hazardous machinery  · Do not make important personal or business decisions  · Do  not drink alcoholic beverages  · If you have not urinated within 8 hours after discharge, please contact your surgeon on call.     Report the following to your surgeon:  · Excessive pain, swelling, redness or odor of or around the surgical area  ·   · Nausea and vomiting lasting longer than 4 hours or if unable to take medications  · Any signs of · Note Type	Event Note      Registered Dietitian Follow-Up     Patient Profile Reviewed                           Yes [x]   No []     Nutrition History Previously Obtained        Yes [x]  No []       Pertinent Subjective Information: Pt was extubated and seen by SLP on 12/19. SLP recommended a dysphagia l/thin liquid diet. Pt reports that he is tolerating a puree diet and eating well.      Pertinent Medical Interventions: Pt presented to the ED for SOB. Pt is admitted for acute respiratory failure from septic shock requiring intubation and pressor support (extubated/off pressors noted). Septic shock 2/2 aspiration PNA, pulmonary edema 2/2 CHF, metabolic alkalosis + resp acidosis, new AF, and hypernatremia (now WNL) noted.      Diet order: Puree/thin      Anthropometrics:  - Ht. 175.26cm/5'9  - Wt. 96.7kg/213#  - Wt range 88.5kg-99.8kg: Question accuracy of bedscale   - BMI 31.5  - IBW 73kg     Pertinent Lab Data: (12/20) BUN 28, creat 0.5, glucose 153     Pertinent Meds: lactulose syrup, docusate sodium, prednisone, insulin infusion, pantoprazole     Physical Findings:  - Appearance: Alert and oriented  - GI function: Pt does not remember his last BM however one was documented yesterday 12/19. Abdomen noted as soft/nontender.   - Tubes: n/a  - Oral/Mouth cavity: n/a  - Skin: Obie score 15. Skin intact.      Nutrition Requirements  Weight Used: ideal 73kg     Estimated Energy Needs    Continue []  Adjust [x] No longer intubated   1944-2120kcals/day (MSJ A.F 1.1-1.2): Lower A.F due to obesity     Estimated Protein Needs    Continue []  Adjust [x]  73-88g/day (1-1.2g/IBW)     Estimated Fluid Needs        Continue [x]  Adjust []  as per CCU team     [] Previous Nutrition Diagnosis: Excessive EN             [] Ongoing          [x] Resolved:     Nutrition Intervention: Meals and snacks      Goal/Expected Outcome: Consume >75% of meals provided for 7 days      Indicator/Monitoring: Will monitor intake, nutrition focused physical findings, and labs. Not at risk.     Recommendations:   Meals and snacks: Continue on a puree diet and upgrade as per SLP decreased circulation or nerve impairment to extremity: change in color, persistent  numbness, tingling, coldness or increase pain  · Any questions  ______________________________________________________________ · Note Type	Event Note      Registered Dietitian Follow-Up     Patient Profile Reviewed                           Yes [x]   No []     Nutrition History Previously Obtained        Yes [x]  No []       Pertinent Subjective Information: Pt was extubated and seen by SLP on 12/19. SLP recommended a dysphagia l/thin liquid diet. Pt reports that he is tolerating a puree diet and eating well.      Pertinent Medical Interventions: Pt presented to the ED for SOB. Pt is admitted for acute respiratory failure from septic shock requiring intubation and pressor support (extubated/off pressors noted). Septic shock 2/2 aspiration PNA, pulmonary edema 2/2 CHF, metabolic alkalosis + resp acidosis, new AF, and hypernatremia (now WNL) noted.      Diet order: Puree/thin      Anthropometrics:  - Ht. 175.26cm/5'9  - Wt. 96.7kg/213#  - Wt range 88.5kg-99.8kg: Question accuracy of bedscale   - BMI 31.5  - IBW 73kg     Pertinent Lab Data: (12/20) BUN 28, creat 0.5, glucose 153     Pertinent Meds: lactulose syrup, docusate sodium, prednisone, insulin infusion, pantoprazole     Physical Findings:  - Appearance: Alert and oriented  - GI function: Pt does not remember his last BM however one was documented yesterday 12/19. Abdomen noted as soft/nontender.   - Tubes: n/a  - Oral/Mouth cavity: n/a  - Skin: Obie score 15. Skin intact.      Nutrition Requirements  Weight Used: ideal 73kg     Estimated Energy Needs    Continue []  Adjust [x] No longer intubated   1944-2120kcals/day (MSJ A.F 1.1-1.2): Lower A.F due to obesity     Estimated Protein Needs    Continue []  Adjust [x]  73-88g/day (1-1.2g/IBW)     Estimated Fluid Needs        Continue [x]  Adjust []  as per CCU team     [] Previous Nutrition Diagnosis: Excessive EN             [] Ongoing          [x] Resolved:     Nutrition Intervention: Meals and snacks      Goal/Expected Outcome: Consume >75% of meals provided for 7 days      Indicator/Monitoring: Will monitor intake, nutrition focused physical findings, and labs. Not at risk.     Recommendations:   Meals and snacks: Continue on a puree diet and upgrade as per SLP. Add low sodium.

## 2021-07-13 NOTE — ANESTHESIA PREPROCEDURE EVALUATION
Relevant Problems   PERSONAL HX & FAMILY HX OF CANCER   (+) Malignant neoplasm of upper-outer quadrant of female breast (HCC)       Anesthetic History   No history of anesthetic complications            Review of Systems / Medical History  Patient summary reviewed, nursing notes reviewed and pertinent labs reviewed    Pulmonary  Within defined limits                 Neuro/Psych   Within defined limits           Cardiovascular  Within defined limits                     GI/Hepatic/Renal  Within defined limits              Endo/Other  Within defined limits           Other Findings              Physical Exam    Airway  Mallampati: II  TM Distance: > 6 cm  Neck ROM: normal range of motion   Mouth opening: Normal     Cardiovascular  Regular rate and rhythm,  S1 and S2 normal,  no murmur, click, rub, or gallop             Dental    Dentition: Caps/crowns     Pulmonary  Breath sounds clear to auscultation               Abdominal  GI exam deferred       Other Findings            Anesthetic Plan    ASA: 2  Anesthesia type: general          Induction: Intravenous  Anesthetic plan and risks discussed with: Patient      LMA

## 2021-07-13 NOTE — INTERVAL H&P NOTE
Update History & Physical    The Patient's History and Physical of 6/25/2021   Left us guided lumpectomy, sln biopsy  was reviewed with the patient and I examined the patient. There was no change. The surgical site was confirmed by the patient and me. Plan:  The risk, benefits, expected outcome, and alternative to the recommended procedure have been discussed with the patient. Patient understands and wants to proceed with the procedure.     Electronically signed by Darian Bangura MD on 7/13/2021 at 2:12 PM

## 2021-07-15 NOTE — ANESTHESIA POSTPROCEDURE EVALUATION
Procedure(s):  LEFT BREAST LUMPECTOMY WITH ULTRASOUND/LEFT BREAST SENTINEL NODE BIOPSY (1200). general    Anesthesia Post Evaluation      Multimodal analgesia: multimodal analgesia used between 6 hours prior to anesthesia start to PACU discharge  Patient location during evaluation: PACU  Patient participation: complete - patient participated  Level of consciousness: awake  Pain score: 2  Pain management: adequate  Airway patency: patent  Anesthetic complications: no  Cardiovascular status: acceptable  Respiratory status: acceptable  Hydration status: acceptable  Comments: I have evaluated the patient and meets criteria for discharge from PACU. Maryan Santos MD  Post anesthesia nausea and vomiting:  controlled  Final Post Anesthesia Temperature Assessment:  Normothermia (36.0-37.5 degrees C)      INITIAL Post-op Vital signs:   Vitals Value Taken Time   /68 07/13/21 1745   Temp     Pulse 73 07/13/21 1754   Resp 22 07/13/21 1754   SpO2 100 % 07/13/21 1754   Vitals shown include unvalidated device data.

## 2021-07-19 ENCOUNTER — DOCUMENTATION ONLY (OUTPATIENT)
Dept: ONCOLOGY | Age: 56
End: 2021-07-19

## 2021-07-19 NOTE — PROGRESS NOTES
Spoke with Abdon Rehman from [facility] in [place], awaiting insurance response but is still being processed, should hopefully be done this week.

## 2021-07-20 ENCOUNTER — DOCUMENTATION ONLY (OUTPATIENT)
Dept: SURGERY | Age: 56
End: 2021-07-20

## 2021-07-21 ENCOUNTER — TELEPHONE (OUTPATIENT)
Dept: SURGERY | Age: 56
End: 2021-07-21

## 2021-07-21 ENCOUNTER — NURSE NAVIGATOR (OUTPATIENT)
Dept: CASE MANAGEMENT | Age: 56
End: 2021-07-21

## 2021-07-21 ENCOUNTER — DOCUMENTATION ONLY (OUTPATIENT)
Dept: SURGERY | Age: 56
End: 2021-07-21

## 2021-07-21 NOTE — PROGRESS NOTES
I received a call from Maile Anthony asking if the patient's [place] paperwork can have a date change? The patient would like to go back to work 8/4 as opposed to 8/6. I told [facility] to let the patient know we could have this completed by Monday.

## 2021-07-21 NOTE — NURSE NAVIGATOR
[address]  Breast Navigator Encounter    Name: Gibson Escalona  Age: 61 y.o.  : 1965  Diagnosis: Left breast IDC; ER+/AZ+/HER2-    Encounter type:  [x]Patient Initiated  []Navigator Follow-up []Pre-op  []Post-op  []Check-in Prior to First Treatment []Treatment Modality Change  []Survivorship Transition []Other:     Narrative:   Pt called for assistance with FMLA paperwork. She states the paperwork has a laxdqj-sm-ghfw date of , however pt would like to change the date to . Called and spoke to Alysa Conner, BAY at [facility] who will discuss with Ezequiel Roger regarding the matter. Called and notified patient that someone would be in contact with her when the changes have been made. No additional questions or concerns at this time.          JOHN HallN, RN, [facility]  Oncology Breast Navigator    [address]  [address] [place] [address]  W: [phone]  F: [phone]  [email or web address]   Good Help to Those in [address] [place] [address]

## 2021-07-22 ENCOUNTER — TELEPHONE (OUTPATIENT)
Dept: SURGERY | Age: 56
End: 2021-07-22

## 2021-07-22 NOTE — TELEPHONE ENCOUNTER
Called and spoke with patient. Reviewed genetic testing results. VUS on BRCA2. She had seen report in [address] Asked about Oncotype results. No results in the chart and looks like they were still processing on 7/19/21 when a nurse called to check. Will inform the patient as soon as those results are available.

## 2021-07-22 NOTE — TELEPHONE ENCOUNTER
I just called [facility] ([facility] for an update. The case is still pending authorization from insurance company. Apparently, there was an issue with the patient's insurance when the test was first ordered (the patient just got a new insurance?), so although Dr. Sheila Ch ordered the test on 6/25/21 and the specimen was received at their lab on 6/29/21, it actually wasn't processed/started until 7/13/21. I hope this helps.  Thank you,  Braxton Hewitt

## 2021-07-26 ENCOUNTER — DOCUMENTATION ONLY (OUTPATIENT)
Dept: SURGERY | Age: 56
End: 2021-07-26

## 2021-07-26 ENCOUNTER — OFFICE VISIT (OUTPATIENT)
Dept: SURGERY | Age: 56
End: 2021-07-26
Payer: COMMERCIAL

## 2021-07-26 VITALS — HEART RATE: 66 BPM | DIASTOLIC BLOOD PRESSURE: 80 MMHG | SYSTOLIC BLOOD PRESSURE: 131 MMHG

## 2021-07-26 DIAGNOSIS — Z17.0 MALIGNANT NEOPLASM OF UPPER-OUTER QUADRANT OF LEFT BREAST IN FEMALE, ESTROGEN RECEPTOR POSITIVE (HCC): Primary | ICD-10-CM

## 2021-07-26 DIAGNOSIS — C50.412 MALIGNANT NEOPLASM OF UPPER-OUTER QUADRANT OF LEFT BREAST IN FEMALE, ESTROGEN RECEPTOR POSITIVE (HCC): Primary | ICD-10-CM

## 2021-07-26 PROCEDURE — 99024 POSTOP FOLLOW-UP VISIT: CPT | Performed by: SURGERY

## 2021-07-26 NOTE — PROGRESS NOTES
FMLA ppw updated with new RTW date of 8/4/21. Faxed to [facility] service. Confirmation fax received.

## 2021-07-26 NOTE — PATIENT INSTRUCTIONS
Breast Cancer: Care Instructions  Your Care Instructions     Breast cancer occurs when abnormal cells grow out of control in the breast. These cancer cells can spread within the breast, to nearby lymph nodes and other tissues, and to other parts of the body. Being treated for cancer can weaken your body, and you may feel very tired. Get the rest your body needs so you can feel better. Finding out that you have cancer is scary. You may feel many emotions and may need some help coping. Seek out family, friends, and counselors for support. You also can do things at home to make yourself feel better while you go through treatment. Call the [employer]box (8-489.552.9029) or visit its website at [employer] for more information. Follow-up care is a key part of your treatment and safety. Be sure to make and go to all appointments, and call your doctor if you are having problems. It's also a good idea to know your test results and keep a list of the medicines you take. How can you care for yourself at home? · Take your medicines exactly as prescribed. Call your doctor if you think you are having a problem with your medicine. You may get medicine for nausea and vomiting if you have these side effects. · Follow your doctor's instructions to relieve pain. Pain from cancer and surgery can almost always be controlled. Use pain medicine when you first notice pain, before it becomes severe. · Eat healthy food. If you do not feel like eating, try to eat food that has protein and extra calories to keep up your strength and prevent weight loss. Drink liquid meal replacements for extra calories and protein. Try to eat your main meal early. · Get some physical activity every day, but do not get too tired. Keep doing the hobbies you enjoy as your energy allows. · Do not smoke. Smoking can make your cancer worse. If you need help quitting, talk to your doctor about stop-smoking programs and medicines.  These can increase your chances of quitting for good. · Take steps to control your stress and workload. Learn relaxation techniques. ? Share your feelings. Stress and tension affect our emotions. By expressing your feelings to others, you may be able to understand and cope with them. ? Consider joining a support group. Talking about a problem with your spouse, a good friend, or other people with similar problems is a good way to reduce tension and stress. ? Express yourself through art. Try writing, crafts, dance, or art to relieve stress. Some dance, writing, or art groups may be available just for people who have cancer. ? Be kind to your body and mind. Getting enough sleep, eating a healthy diet, and taking time to do things you enjoy can contribute to an overall feeling of balance in your life and can help reduce stress. ? Get help if you need it. Discuss your concerns with your doctor or counselor. · If you are vomiting or have diarrhea:  ? Drink plenty of fluids to prevent dehydration. Choose water and other caffeine-free clear liquids. If you have kidney, heart, or liver disease and have to limit fluids, talk with your doctor before you increase the amount of fluids you drink. ? When you are able to eat, try clear soups, mild foods, and liquids until all symptoms are gone for 12 to 48 hours. Other good choices include dry toast, crackers, cooked cereal, and gelatin dessert, such as Jell-O.  · If you have not already done so, prepare a list of advance directives. Advance directives are instructions to your doctor and family members about what kind of care you want if you become unable to speak or express yourself. When should you call for help? Call 911 anytime you think you may need emergency care. For example, call if:    · You passed out (lost consciousness).    Call your doctor now or seek immediate medical care if:    · You have a fever.     · You have abnormal bleeding.     · You think you have an infection.     · You have new or worse pain.     · You have new symptoms, such as a cough, belly pain, vomiting, diarrhea, or a rash. Watch closely for changes in your health, and be sure to contact your doctor if:    · You are much more tired than usual.     · You have swollen glands in your armpits, groin, or neck.     · You do not get better as expected. Where can you learn more? Go to http://www.QuarterSpot.com/  Enter V321 in the search box to learn more about \"Breast Cancer: Care Instructions. \"  Current as of: December 17, 2020               Content Version: 12.8  © 9613-4204 "[employer]. Care instructions adapted under license by [employer] (which disclaims liability or warranty for this information). If you have questions about a medical condition or this instruction, always ask your healthcare professional. [address] any warranty or liability for your use of this information.

## 2021-07-26 NOTE — PROGRESS NOTES
HISTORY OF PRESENT ILLNESS  Adam Patrick is a 61 y.o. female. HPI  ESTABLISHED patient here for post op follow up, s/p LEFT breast lumpectomy. She is doing well. Have some discomfort to breast but using tylenol and ice to the area. 21 - LEFT breast lumpectomy, SNbx   Patient Name: Gisel Said #:[id number]   Patient ID: [id number]                        Account [de-identified]   /Gender: 1965 (Age: 56)/F            Location: [facility]     Collect: 2021    Rec'd: 2021      Reported: 2021     Physician(s):    Lupe Cisneros MD                      SURGICAL PATHOLOGY REPORT      CLINICAL HISTORY    Left breast cancer       FINAL PATHOLOGIC DIAGNOSIS    1.  Lymph node, left axillary, sentinel #1, excisional biopsy:        One lymph node positive for metastatic carcinoma ()     2.  Breast, left, lumpectomy:        Invasive ductal carcinoma   Ductal carcinoma in situ   Fibrocystic changes including apocrine metaplasia   Biopsy site changes   See comment and synoptic report     3.  Breast, left, lateral margin, excision:        Benign breast   Fibrocystic changes including columnar alteration     4.  Breast, left, superior margin, excision:        Benign breast     5.  Breast, left, medial margin, excision:        Benign breast        Fibrocystic changes including usual ductal hyperplasia, apocrine   metaplasia     6.  Breast, left, inferior margin, excision:        Benign breast   Fibrocystic changes including microcalcifications     7.  Breast, left, anterior margin, excision:        Focal fat necrosis with no evidence of malignancy     8.  Breast, left, deep margin, excision:        Benign breast   Fibrocystic changes including usual ductal hyperplasia, apocrine   metaplasia, columnar alteration, microcalcification     9.  Lymph node, left axillary, sentinel #2, excisional biopsy:        One lymph node negative for metastatic carcinoma (0)     10.  Lymph nodes, left axillary, sentinel #3, excisional biopsy:        Two lymph nodes negative for metastatic carcinoma (0/2)     11.  Lymph node, left axillary, sentinel node #4, excision:        Benign fibrovascular and adipose tissue     Synoptic report (specimens #1, #2, #9, #10):   Procedure: Lumpectomy   Laterality: Left   Tumor size: 32 mm   Histologic type: Invasive ductal carcinoma   Histologic grade (Waterford histologic score)        Glandular differentiation: 3        Nuclear pleomorphism: 2        Mitotic rate: 3        Overall ndgndrndanddndend:nd nd2nd Ductal carcinoma in situ: Present        Negative for extensive intraductal component        Extent of DCIS: Microscopic, peritumoral foci        Architectural patterns: Solid, cribriform, comedo             Nuclear grade: 2, intermediate        Necrosis: Present, expansive   Lobular carcinoma in situ: Not identified   Margins: Positive for invasive carcinoma, anterior and inferior        Uninvolved with ductal carcinoma in situ        Closest margins to DCIS: 4 mm, anterior and inferior   Lymph nodes: One of four lymph nodes positive for metastatic carcinoma   (1/4)        Size of metastasis: 2.2 mm        Extracapsular extension: Not identified   Treatment effect: No known presurgical therapy   Lymphovascular invasion: Present     Pathologic stage (TNM AJCC 8th Edition):   Primary tumor: pT2   Regional lymph nodes: pN1a            There is extensive lymphovascular invasion.  The positive margins present   in the main lumpectomy specimen are supersede the by the separately   submitted negative margins. 6/21/21 - LEFT breast bx:  LEFT breast IDC, gr2,  ER 99%, PA 80%, HER2 neg, ki-67 30%     Family History:  Sister, breast cancer, dx at 61, survivor  Review of Systems   All other systems reviewed and are negative. Physical Exam  Vitals and nursing note reviewed.    Chest:          Comments: Incisions healing well         ASSESSMENT and PLAN    ICD-10-CM ICD-9-CM 1. Malignant neoplasm of upper-outer quadrant of left breast in female, estrogen receptor positive ([facility] [address]  C50.412 174.4     Z17.0 V86.0      Stage 2 left breast IDC gr2,  ER 99%, ID 80%, HER2 neg, ki-67 30%  Refer back to med onc  Will need xrt as well.    Refer to them  F/u with us 4-6 months

## 2021-07-28 ENCOUNTER — TELEPHONE (OUTPATIENT)
Dept: SURGERY | Age: 56
End: 2021-07-28

## 2021-07-28 NOTE — TELEPHONE ENCOUNTER
Received call from patient and Oncotype dx. Patient called oncotype dx to have report corrected now that she is node positive. oncotype needed Dr. Haywood Cowden to confirm. I reviewed patient's chart and called Oncotype to confirm that node is indeed positive on her surgical specimen. They will recalculate and send new report. I called patient and she was calling to let me know about above. I let her know that I did confirm the node status with oncotype and they will update report. I let her know I would call her when we received the results. She was appreciative.

## 2021-07-29 DIAGNOSIS — Z17.0 MALIGNANT NEOPLASM OF UPPER-OUTER QUADRANT OF LEFT BREAST IN FEMALE, ESTROGEN RECEPTOR POSITIVE (HCC): Primary | ICD-10-CM

## 2021-07-29 DIAGNOSIS — C50.412 MALIGNANT NEOPLASM OF UPPER-OUTER QUADRANT OF LEFT BREAST IN FEMALE, ESTROGEN RECEPTOR POSITIVE (HCC): Primary | ICD-10-CM

## 2021-07-30 ENCOUNTER — DOCUMENTATION ONLY (OUTPATIENT)
Dept: SURGERY | Age: 56
End: 2021-07-30

## 2021-07-30 ENCOUNTER — TRANSCRIBE ORDER (OUTPATIENT)
Dept: SCHEDULING | Age: 56
End: 2021-07-30

## 2021-07-30 DIAGNOSIS — N63.21 UNSPECIFIED LUMP IN THE LEFT BREAST, UPPER OUTER QUADRANT: Primary | ICD-10-CM

## 2021-07-30 DIAGNOSIS — C50.412 MALIGNANT NEOPLASM OF UPPER-OUTER QUADRANT OF LEFT FEMALE BREAST (HCC): ICD-10-CM

## 2021-08-02 ENCOUNTER — NURSE NAVIGATOR (OUTPATIENT)
Dept: CASE MANAGEMENT | Age: 56
End: 2021-08-02

## 2021-08-02 NOTE — PROGRESS NOTES
[address]  Breast Navigator Encounter    Name:    Trell Hatch  Age:    61 y.o. Diagnosis:    LEFT breast cancer    Interdisciplinary Team:  Med-Onc:    Dr. Rita Lozoya  Surg-Onc:   Dr. Carmina Diego. Christ/  Dr. Liliam Aragon:   Dr. Laci Trejo:    :     Nurse Navigator:  Ulices Chaney, RN, BSN, [facility]      Encounter type:  []Patient Initiated  [x]Navigator Follow-up []Pre-op  []Post-op  []Check-in Prior to First Treatment []Treatment Modality Change  []Survivorship Transition []Other:       Narrative:    Returned patient's call. Had questions about activity. Asks if she can go back to some weightlifting. I told her to go slow and work her way up. Wants to jog. I recommended waiting a little while on this. Would like to get a massage, but she needs a note for this. I told her I would ask Dr. Rita Lozoya to write a note for her. This can be mailed to her home address. Sees Dr. Jennifer Hightower on Wednesday and Dr. Angel Vazquez next week. She will then decide on what type of treatment she will have and who she will see. She was very appreciative of all of my assistance. I will continue to follow her. Referrals/Handouts:    Wants a note for massage.             Ulices Chaney, RN, BSN, [facility]  Oncology Breast Navigator     [address] [place] [address]   [facility]  W: [phone]  F: [phone]  [email or web address]  Good Help to Those in [facility]

## 2021-08-04 ENCOUNTER — OFFICE VISIT (OUTPATIENT)
Dept: ONCOLOGY | Age: 56
End: 2021-08-04
Payer: COMMERCIAL

## 2021-08-04 ENCOUNTER — DOCUMENTATION ONLY (OUTPATIENT)
Dept: SURGERY | Age: 56
End: 2021-08-04

## 2021-08-04 VITALS
RESPIRATION RATE: 16 BRPM | HEART RATE: 67 BPM | SYSTOLIC BLOOD PRESSURE: 122 MMHG | OXYGEN SATURATION: 99 % | WEIGHT: 138 LBS | TEMPERATURE: 97.8 F | DIASTOLIC BLOOD PRESSURE: 79 MMHG | BODY MASS INDEX: 23.56 KG/M2 | HEIGHT: 64 IN

## 2021-08-04 DIAGNOSIS — Z17.0 MALIGNANT NEOPLASM OF UPPER-OUTER QUADRANT OF BREAST IN FEMALE, ESTROGEN RECEPTOR POSITIVE, UNSPECIFIED LATERALITY (HCC): Primary | ICD-10-CM

## 2021-08-04 DIAGNOSIS — C50.419 MALIGNANT NEOPLASM OF UPPER-OUTER QUADRANT OF BREAST IN FEMALE, ESTROGEN RECEPTOR POSITIVE, UNSPECIFIED LATERALITY (HCC): Primary | ICD-10-CM

## 2021-08-04 PROCEDURE — 99205 OFFICE O/P NEW HI 60 MIN: CPT | Performed by: INTERNAL MEDICINE

## 2021-08-04 RX ORDER — LETROZOLE 2.5 MG/1
2.5 TABLET, FILM COATED ORAL DAILY
Qty: 90 TABLET | Refills: 1 | Status: SHIPPED | OUTPATIENT
Start: 2021-08-04

## 2021-08-04 NOTE — LETTER
8/17/2021    Patient: Judy Ortiz   YOB: 1965   Date of Visit: 8/4/2021     Ginna Tena, [address] [place] [address]    Dear Ginna Tena MD,      Thank you for referring Ms. Vicenta Olvera to [address] [place] [address] for evaluation. My notes for this consultation are attached. If you have questions, please do not hesitate to call me. I look forward to following your patient along with you.       Sincerely,    Cristal New MD

## 2021-08-04 NOTE — PROGRESS NOTES
Completed Yolanda [employer] ppw and faxed back. Confirmation fax received. Copy will be scanned into chart.

## 2021-08-04 NOTE — PROGRESS NOTES
PER VORGALI from Dr. Licha Soto LETROZOLE [facility]) ONE TAB ONCE A DAY QUANTITY 90 REFILL 1

## 2021-08-04 NOTE — PROGRESS NOTES
Chief Complaint   Patient presents with    New Patient     2nd opinion/breast      Visit Vitals  /79 (BP 1 Location: Right arm, BP Patient Position: Sitting, BP Cuff Size: Large adult)   Pulse 67   Temp 97.8 °F (36.6 °C) (Oral)   Resp 16   Ht 5' 4\" (1.626 m)   Wt 138 lb (62.6 kg)   SpO2 99%   BMI 23.69 kg/m²

## 2021-08-06 ENCOUNTER — HOSPITAL ENCOUNTER (OUTPATIENT)
Dept: RADIATION THERAPY | Age: 56
Discharge: HOME | End: 2021-08-06

## 2021-08-11 DIAGNOSIS — Z01.818 PRE-OP EVALUATION: Primary | ICD-10-CM

## 2021-08-17 ENCOUNTER — HOSPITAL ENCOUNTER (OUTPATIENT)
Dept: NON INVASIVE DIAGNOSTICS | Age: 56
Discharge: HOME | End: 2021-08-17
Attending: INTERNAL MEDICINE
Payer: COMMERCIAL

## 2021-08-17 VITALS
WEIGHT: 136.69 LBS | DIASTOLIC BLOOD PRESSURE: 79 MMHG | BODY MASS INDEX: 23.34 KG/M2 | HEIGHT: 64 IN | SYSTOLIC BLOOD PRESSURE: 122 MMHG

## 2021-08-17 DIAGNOSIS — Z01.818 PRE-OP EVALUATION: ICD-10-CM

## 2021-08-17 LAB
ECHO AO ROOT DIAM: 2.86 CM
ECHO AV PEAK GRADIENT: 4.51 MMHG
ECHO AV PEAK VELOCITY: 106.23 CM/S
ECHO EST RA PRESSURE: 3 MMHG
ECHO LA MAJOR AXIS: 2.79 CM
ECHO LA MINOR AXIS: 1.68 CM
ECHO LV GLOBAL LONGITUDINAL STRAIN (GLS): -18.8 PERCENT
ECHO LV INTERNAL DIMENSION DIASTOLIC: 3.87 CM (ref 3.9–5.3)
ECHO LV INTERNAL DIMENSION SYSTOLIC: 2.42 CM
ECHO LV IVSD: 0.82 CM (ref 0.6–0.9)
ECHO LV MASS 2D: 84.1 G (ref 67–162)
ECHO LV MASS INDEX 2D: 50.7 G/M2 (ref 43–95)
ECHO LV POSTERIOR WALL DIASTOLIC: 0.72 CM (ref 0.6–0.9)
ECHO LVOT PEAK GRADIENT: 5.69 MMHG
ECHO LVOT PEAK VELOCITY: 119.3 CM/S
ECHO MV A VELOCITY: 64.29 CM/S
ECHO MV E DECELERATION TIME (DT): 227.35 MS
ECHO MV E VELOCITY: 58.75 CM/S
ECHO MV E/A RATIO: 0.91
ECHO PV PEAK INSTANTANEOUS GRADIENT SYSTOLIC: 3.03 MMHG
ECHO RIGHT VENTRICULAR SYSTOLIC PRESSURE (RVSP): 17.47 MMHG
ECHO RV TAPSE: 1.61 CM (ref 1.5–2)
ECHO TV REGURGITANT MAX VELOCITY: 190.22 CM/S
ECHO TV REGURGITANT PEAK GRADIENT: 14.47 MMHG
GLOBAL LONGITUDINAL STRAIN 2 CHAMBER: -19.6 PERCENT
GLOBAL LONGITUDINAL STRAIN 4 CHAMBER: -19.7 PERCENT
GLOBAL LONGITUDINAL STRAIN LONG AXIS: -17 PERCENT

## 2021-08-17 PROCEDURE — 93306 TTE W/DOPPLER COMPLETE: CPT | Performed by: SPECIALIST

## 2021-08-17 PROCEDURE — 93306 TTE W/DOPPLER COMPLETE: CPT

## 2021-08-18 ENCOUNTER — HOSPITAL ENCOUNTER (OUTPATIENT)
Dept: BONE DENSITY | Age: 56
Discharge: HOME | End: 2021-08-18
Attending: INTERNAL MEDICINE
Payer: COMMERCIAL

## 2021-08-18 DIAGNOSIS — N63.21 UNSPECIFIED LUMP IN THE LEFT BREAST, UPPER OUTER QUADRANT: ICD-10-CM

## 2021-08-18 DIAGNOSIS — C50.412 MALIGNANT NEOPLASM OF UPPER-OUTER QUADRANT OF LEFT FEMALE BREAST (HCC): ICD-10-CM

## 2021-08-18 PROCEDURE — 77080 DXA BONE DENSITY AXIAL: CPT

## 2021-08-19 ENCOUNTER — DOCUMENTATION ONLY (OUTPATIENT)
Dept: FAMILY MEDICINE CLINIC | Age: 56
End: 2021-08-19

## 2021-08-19 NOTE — PROGRESS NOTES
Patient requested me to review the density scan. Appears that orders were placed by heme-onc. Reviewed and does show osteopenia. Not severe enough to warrant bisphosphonates by guidelines but can continue calcium/vitamin D and weightbearing exercise. Could consider starting Fosamax once weekly with lumbar spine T score of -1.9. Low 10-year risk of major osteoporotic fracture or hip fracture overall though.

## 2021-08-20 NOTE — PROGRESS NOTES
LV: Estimated LVEF is 55 - 60%. Normal cavity size, systolic function (ejection fraction normal) and diastolic function. Mild concentric hypertrophy. Wall motion: normal. Normal left ventricular strain. MV: Mild mitral annular calcification.

## 2021-08-24 ENCOUNTER — HOSPITAL ENCOUNTER (OUTPATIENT)
Dept: RADIATION THERAPY | Age: 56
Discharge: HOME | End: 2021-08-24

## 2021-08-25 ENCOUNTER — HOSPITAL ENCOUNTER (OUTPATIENT)
Dept: RADIATION THERAPY | Age: 56
Discharge: HOME | End: 2021-08-25

## 2021-08-26 ENCOUNTER — HOSPITAL ENCOUNTER (OUTPATIENT)
Dept: RADIATION THERAPY | Age: 56
Discharge: HOME | End: 2021-08-26

## 2021-08-27 ENCOUNTER — HOSPITAL ENCOUNTER (OUTPATIENT)
Dept: RADIATION THERAPY | Age: 56
Discharge: HOME | End: 2021-08-27

## 2021-08-30 ENCOUNTER — HOSPITAL ENCOUNTER (OUTPATIENT)
Dept: RADIATION THERAPY | Age: 56
Discharge: HOME | End: 2021-08-30

## 2021-08-31 ENCOUNTER — HOSPITAL ENCOUNTER (OUTPATIENT)
Dept: RADIATION THERAPY | Age: 56
Discharge: HOME | End: 2021-08-31

## 2021-09-01 ENCOUNTER — HOSPITAL ENCOUNTER (OUTPATIENT)
Dept: RADIATION THERAPY | Age: 56
Discharge: HOME | End: 2021-09-01

## 2021-09-02 ENCOUNTER — HOSPITAL ENCOUNTER (OUTPATIENT)
Dept: RADIATION THERAPY | Age: 56
Discharge: HOME | End: 2021-09-02

## 2021-09-03 ENCOUNTER — HOSPITAL ENCOUNTER (OUTPATIENT)
Dept: RADIATION THERAPY | Age: 56
Discharge: HOME | End: 2021-09-03

## 2021-09-07 ENCOUNTER — DOCUMENTATION ONLY (OUTPATIENT)
Dept: SURGERY | Age: 56
End: 2021-09-07

## 2021-09-07 NOTE — PROGRESS NOTES
[employer] trying to get Oncotype test covered. Faxed copy of reports from medical oncologist.  Confirmation fax received.

## 2021-09-08 ENCOUNTER — HOSPITAL ENCOUNTER (OUTPATIENT)
Dept: RADIATION THERAPY | Age: 56
Discharge: HOME | End: 2021-09-08

## 2021-09-09 ENCOUNTER — HOSPITAL ENCOUNTER (OUTPATIENT)
Dept: RADIATION THERAPY | Age: 56
Discharge: HOME | End: 2021-09-09

## 2021-09-10 ENCOUNTER — HOSPITAL ENCOUNTER (OUTPATIENT)
Dept: RADIATION THERAPY | Age: 56
Discharge: HOME | End: 2021-09-10

## 2021-09-13 ENCOUNTER — OFFICE VISIT (OUTPATIENT)
Dept: FAMILY MEDICINE CLINIC | Age: 56
End: 2021-09-13
Payer: COMMERCIAL

## 2021-09-13 ENCOUNTER — HOSPITAL ENCOUNTER (OUTPATIENT)
Dept: RADIATION THERAPY | Age: 56
Discharge: HOME | End: 2021-09-13

## 2021-09-13 VITALS
RESPIRATION RATE: 16 BRPM | HEART RATE: 66 BPM | TEMPERATURE: 97.2 F | DIASTOLIC BLOOD PRESSURE: 68 MMHG | HEIGHT: 64 IN | OXYGEN SATURATION: 100 % | BODY MASS INDEX: 23.35 KG/M2 | SYSTOLIC BLOOD PRESSURE: 109 MMHG | WEIGHT: 136.8 LBS

## 2021-09-13 DIAGNOSIS — H83.03 INFECTION OF THE INNER EAR, BILATERAL: Primary | ICD-10-CM

## 2021-09-13 PROCEDURE — 99213 OFFICE O/P EST LOW 20 MIN: CPT | Performed by: INTERNAL MEDICINE

## 2021-09-13 RX ORDER — LEVOFLOXACIN 500 MG/1
500 TABLET, FILM COATED ORAL DAILY
Qty: 7 TABLET | Refills: 0 | Status: SHIPPED | OUTPATIENT
Start: 2021-09-13 | End: 2021-09-13 | Stop reason: ALTCHOICE

## 2021-09-13 RX ORDER — CEFUROXIME AXETIL 500 MG/1
500 TABLET ORAL 2 TIMES DAILY
Qty: 14 TABLET | Refills: 0 | Status: SHIPPED | OUTPATIENT
Start: 2021-09-13 | End: 2021-09-20

## 2021-09-13 RX ORDER — LEVOFLOXACIN 500 MG/1
500 TABLET, FILM COATED ORAL DAILY
Qty: 7 TABLET | Refills: 0 | Status: SHIPPED | OUTPATIENT
Start: 2021-09-13 | End: 2021-09-13 | Stop reason: SDUPTHER

## 2021-09-13 NOTE — PROGRESS NOTES
Chief Complaint   Patient presents with    Follow-up       1. Have you been to the ER, urgent care clinic since your last visit? Hospitalized since your last visit? No    2. Have you seen or consulted any other health care providers outside of the [address] [place] [address] since your last visit? Include any pap smears or colon screening.  No

## 2021-09-13 NOTE — PATIENT INSTRUCTIONS
Labyrinthitis: Care Instructions  Your Care Instructions     Labyrinthitis (say \"lab-uh-rin-THY-tus\") is a problem deep inside your inner ear. It happens when the labyrinth gets inflamed. That's the part of your inner ear that helps control your balance. The problem may cause vertigo. Vertigo makes you feel like you're spinning or whirling. You may feel sick to your stomach or vomit. You may lose your hearing for a while. Or you may have a ringing sound in your ears. Most of the time, labyrinthitis goes away on its own. This often takes several weeks. If the problem is caused by bacteria, your doctor will give you antibiotics. But most cases are caused by a virus. A virus can't be cured with antibiotics. Your doctor may give you medicines to help control the nausea and vomiting. Follow-up care is a key part of your treatment and safety. Be sure to make and go to all appointments, and call your doctor if you are having problems. It's also a good idea to know your test results and keep a list of the medicines you take. How can you care for yourself at home? · Try bed rest and keeping your head still for the first few days you have vertigo. This may help the vertigo and reduce nausea and vomiting. · Return to your normal activities if vertigo lasts more than a few days. This may be hard, but it usually helps your brain adapt to the vertigo more quickly. As your brain adapts, vertigo will slowly go away. · Do what you can to prevent falls. For example, keep your home uncluttered, and use nonskid mats around your house and in your bath. Vertigo makes you more likely to fall. · Try balance exercises for vertigo if your doctor suggests it. An example is to stand with your feet together, arms at your sides. Hold this position for 30 seconds. · Do the Curiel-Daroff exercise if your doctor suggests it. It may help your brain adapt to vertigo. ? Sit on the edge of your bed or sofa.   ? Quickly lie down on one side.  ? Stay in this position until the vertigo goes away or for at least 30 seconds. ? Sit up. If this causes vertigo, wait for it to stop. ? Do the exercise on the other side. ? Repeat these steps 10 times. Do the exercise 2 times a day until the vertigo is gone. · Take your medicines exactly as prescribed. Call your doctor if you think you are having a problem with your medicine. · If your doctor prescribed antibiotics, take them as directed. Do not stop taking them just because you feel better. You need to take the full course of antibiotics. When should you call for help? Call 911 anytime you think you may need emergency care. For example, call if:    · You passed out (lost consciousness).     · You have symptoms of a stroke. These may include:  ? Sudden numbness, tingling, weakness, or loss of movement in your face, arm, or leg, especially on only one side of your body. ? Sudden vision changes. ? Sudden trouble speaking. ? Sudden confusion or trouble understanding simple statements. ? Sudden problems with walking or balance. ? A sudden, severe headache that is different from past headaches. Call your doctor now or seek immediate medical care if:    · You have new or worse dizziness.     · You notice changes in your hearing. Watch closely for changes in your health, and be sure to contact your doctor if:    · You have new or worse nausea or vomiting.     · Your vertigo gets worse.     · Your vertigo has not gotten better in 2 weeks. Where can you learn more? Go to http://www.gray.com/  Enter L726 in the search box to learn more about \"Labyrinthitis: Care Instructions. \"  Current as of: December 2, 2020               Content Version: 12.8  © 6735-0941 [employer]. Care instructions adapted under license by [employer] (which disclaims liability or warranty for this information).  If you have questions about a medical condition or this instruction, always ask your healthcare professional. [place] [address] any warranty or liability for your use of this information.

## 2021-09-13 NOTE — PROGRESS NOTES
Chief Complaint   Patient presents with    Follow-up     dizziness     HPI:  Dano Santo is a 61 y.o. female with significant medical below including breast, s/p left lumpectomy on radiation therapy presents is seen for dizziness. Dizziness is made worse with change in position of head. She reports associated nausea and vomiting. She is current undergoing radiation therapy. Symptoms could be side effects of radiation. Reports itching ears, L>R but no pain, muffle or drain. Review of Systems  As per hpi    Past Medical History:   Diagnosis Date    Breast cancer in female ([address]     Cancer [facility] [address]     Eczema     S/P lumpectomy of breast     Ventral hernia 2014       Past Surgical History:   Procedure Laterality Date    HX BREAST LUMPECTOMY Left 2021    LEFT BREAST LUMPECTOMY WITH ULTRASOUND/LEFT BREAST SENTINEL NODE BIOPSY (1200) performed by Janeen Bosworth, [facility] at [facility] AM[facility]    HX  SECTION      HX GI      colonscopy    HX GYN      fibroid sx    HX GYN      c section    HX MYOMECTOMY   &      Social History     Socioeconomic History    Marital status:      Spouse name: Not on file    Number of children: Not on file    Years of education: Not on file    Highest education level: Not on file   Tobacco Use    Smoking status: Never Smoker    Smokeless tobacco: Never Used   Vaping Use    Vaping Use: Never used   Substance and Sexual Activity    Alcohol use: No    Drug use: No    Sexual activity: Yes     Partners: Male     Social Determinants of Health     Financial Resource Strain:     Difficulty of Paying Living Expenses:    Food Insecurity:     Worried About Running Out of Food in the Last Year:     Ran Out of Food in the Last Year:    Transportation Needs:     Lack of Transportation (Medical):      Lack of Transportation (Non-Medical):    Physical Activity:     Days of Exercise per Week:     Minutes of Exercise per Session:    Stress:     Feeling of Stress :    Social Connections:     Frequency of Communication with Friends and Family:     Frequency of Social Gatherings with Friends and Family:     Attends Buddhist Services:     Active Member of Clubs or Organizations:     Attends Club or Organization Meetings:     Marital Status:      Family History   Problem Relation Age of Onset    Hypertension Mother     Diabetes Father     Breast Cancer Sister 61     Current Outpatient Medications   Medication Sig Dispense Refill    letrozole (FEMARA) 2.5 mg tablet Take 1 Tablet by mouth daily. To start after radiation is complete 90 Tablet 1    clotrimazole-betamethasone (LOTRISONE) topical cream Apply 2 times a day to affected area 45 g 1    multivitamin (ONE A DAY) tablet Take 1 Tab by mouth daily.        No Known Allergies    Objective:  Visit Vitals  /68   Pulse 66   Temp 97.2 °F (36.2 °C) (Temporal)   Resp 16   Ht 5' 4\" (1.626 m)   Wt 136 lb 12.8 oz (62.1 kg)   SpO2 100%   BMI 23.48 kg/m²     Physical Exam:   General appearance - alert, well appearing in no apparent distress  Mental status - alert, oriented to person, place, and time  ENT-bilateral inner ear erythema, palpabe neck nodes, no sinus tenderness  Nose - normal and patent, no erythema, discharge or polyps    Results for orders placed or performed during the hospital encounter of 08/17/21   ECHO ADULT COMPLETE   Result Value Ref Range    IVSd 0.82 0.60 - 0.90 cm    LVIDd 3.87 (A) 3.90 - 5.30 cm    LVIDs 2.42 cm    LVPWd 0.72 0.60 - 0.90 cm    Global Longitudinal Strain 2 Chamber -19.6 percent    Global Longitudinal Strain 4 Chamber -19.7 percent    Global Longitudinal Strain Long Axis -17.0 percent    Global Longitudinal Strain -18.8 percent    LVOT Peak Gradient 5.69 mmHg    LVOT Peak Velocity 119.30 cm/s    RVSP 17.47 mmHg    Left Atrium Major Axis 2.79 cm    Est. RA Pressure 3.00 mmHg    AoV PG 4.51 mmHg    Aortic Valve Systolic Peak Velocity 063.72 cm/s    MV A Dionte 64.29 cm/s    Mitral Valve E Wave Deceleration Time 227.35 ms    MV E Dionte 58.75 cm/s    Pulmonic Valve Systolic Peak Instantaneous Gradient 3.03 mmHg    Tapse 1.61 1.50 - 2.00 cm    Triscuspid Valve Regurgitation Peak Gradient 14.47 mmHg    TR Max Velocity 190.22 cm/s    Ao Root D 2.86 cm    MV E/A 0.91     LV Mass AL 84.1 67.0 - 162.0 g    LV Mass AL Index 50.7 43.0 - 95.0 g/m2    Left Atrium Minor Axis 1.68 cm     Assessment/Plan:  Diagnoses and all orders for this visit:    Infection of the inner ear, bilateral  -     Discontinue: levoFLOXacin (LEVAQUIN) 500 mg tablet; Take 1 Tablet by mouth daily for 7 days. , Normal, Disp-7 Tablet, R-0  -     Discontinue: levoFLOXacin (LEVAQUIN) 500 mg tablet; Take 1 Tablet by mouth daily for 7 days. , Normal, Disp-7 Tablet, R-0  -     cefUROXime (CEFTIN) 500 mg tablet; Take 1 Tablet by mouth two (2) times a day for 7 days. , Normal, Disp-14 Tablet, R-0      Patient Instructions          Labyrinthitis: Care Instructions  Your Care Instructions     Labyrinthitis (say \"lab-uh-rin-THY-tus\") is a problem deep inside your inner ear. It happens when the labyrinth gets inflamed. That's the part of your inner ear that helps control your balance. The problem may cause vertigo. Vertigo makes you feel like you're spinning or whirling. You may feel sick to your stomach or vomit. You may lose your hearing for a while. Or you may have a ringing sound in your ears. Most of the time, labyrinthitis goes away on its own. This often takes several weeks. If the problem is caused by bacteria, your doctor will give you antibiotics. But most cases are caused by a virus. A virus can't be cured with antibiotics. Your doctor may give you medicines to help control the nausea and vomiting. Follow-up care is a key part of your treatment and safety. Be sure to make and go to all appointments, and call your doctor if you are having problems.  It's also a good idea to know your test results and keep a list of the medicines you take. How can you care for yourself at home? · Try bed rest and keeping your head still for the first few days you have vertigo. This may help the vertigo and reduce nausea and vomiting. · Return to your normal activities if vertigo lasts more than a few days. This may be hard, but it usually helps your brain adapt to the vertigo more quickly. As your brain adapts, vertigo will slowly go away. · Do what you can to prevent falls. For example, keep your home uncluttered, and use nonskid mats around your house and in your bath. Vertigo makes you more likely to fall. · Try balance exercises for vertigo if your doctor suggests it. An example is to stand with your feet together, arms at your sides. Hold this position for 30 seconds. · Do the Curiel-Daroff exercise if your doctor suggests it. It may help your brain adapt to vertigo. ? Sit on the edge of your bed or sofa. ? Quickly lie down on one side. ? Stay in this position until the vertigo goes away or for at least 30 seconds. ? Sit up. If this causes vertigo, wait for it to stop. ? Do the exercise on the other side. ? Repeat these steps 10 times. Do the exercise 2 times a day until the vertigo is gone. · Take your medicines exactly as prescribed. Call your doctor if you think you are having a problem with your medicine. · If your doctor prescribed antibiotics, take them as directed. Do not stop taking them just because you feel better. You need to take the full course of antibiotics. When should you call for help? Call 911 anytime you think you may need emergency care. For example, call if:    · You passed out (lost consciousness).     · You have symptoms of a stroke. These may include:  ? Sudden numbness, tingling, weakness, or loss of movement in your face, arm, or leg, especially on only one side of your body. ? Sudden vision changes. ? Sudden trouble speaking.   ? Sudden confusion or trouble understanding simple statements. ? Sudden problems with walking or balance. ? A sudden, severe headache that is different from past headaches. Call your doctor now or seek immediate medical care if:    · You have new or worse dizziness.     · You notice changes in your hearing. Watch closely for changes in your health, and be sure to contact your doctor if:    · You have new or worse nausea or vomiting.     · Your vertigo gets worse.     · Your vertigo has not gotten better in 2 weeks. Where can you learn more? Go to http://www.gray.com/  Enter V520 in the search box to learn more about \"Labyrinthitis: Care Instructions. \"  Current as of: December 2, 2020               Content Version: 12.8  © 2006-2021 [employer]. Care instructions adapted under license by [employer] (which disclaims liability or warranty for this information). If you have questions about a medical condition or this instruction, always ask your healthcare professional. [place] [address] any warranty or liability for your use of this information. Follow-up and Dispositions    · Return if symptoms worsen or fail to improve.

## 2021-09-14 ENCOUNTER — HOSPITAL ENCOUNTER (OUTPATIENT)
Dept: RADIATION THERAPY | Age: 56
Discharge: HOME | End: 2021-09-14

## 2021-09-14 DIAGNOSIS — R42 DIZZINESS: Primary | ICD-10-CM

## 2021-09-14 DIAGNOSIS — R26.81 UNSTEADY GAIT: ICD-10-CM

## 2021-09-14 DIAGNOSIS — D05.12 CANCER OF BREAST, INTRADUCTAL, LEFT: ICD-10-CM

## 2021-09-15 ENCOUNTER — HOSPITAL ENCOUNTER (OUTPATIENT)
Dept: RADIATION THERAPY | Age: 56
Discharge: HOME | End: 2021-09-15

## 2021-09-16 ENCOUNTER — HOSPITAL ENCOUNTER (OUTPATIENT)
Dept: RADIATION THERAPY | Age: 56
Discharge: HOME | End: 2021-09-16

## 2021-09-17 ENCOUNTER — HOSPITAL ENCOUNTER (OUTPATIENT)
Dept: RADIATION THERAPY | Age: 56
Discharge: HOME | End: 2021-09-17

## 2021-09-20 ENCOUNTER — HOSPITAL ENCOUNTER (OUTPATIENT)
Dept: RADIATION THERAPY | Age: 56
Discharge: HOME | End: 2021-09-20

## 2021-09-21 ENCOUNTER — HOSPITAL ENCOUNTER (OUTPATIENT)
Dept: RADIATION THERAPY | Age: 56
Discharge: HOME | End: 2021-09-21

## 2021-09-22 ENCOUNTER — HOSPITAL ENCOUNTER (OUTPATIENT)
Dept: RADIATION THERAPY | Age: 56
Discharge: HOME | End: 2021-09-22

## 2021-09-22 ENCOUNTER — TRANSCRIBE ORDER (OUTPATIENT)
Dept: SCHEDULING | Age: 56
End: 2021-09-22

## 2021-09-22 DIAGNOSIS — R42 DIZZINESS AND GIDDINESS: Primary | ICD-10-CM

## 2021-09-23 ENCOUNTER — HOSPITAL ENCOUNTER (OUTPATIENT)
Dept: RADIATION THERAPY | Age: 56
Discharge: HOME | End: 2021-09-23

## 2021-09-24 ENCOUNTER — HOSPITAL ENCOUNTER (OUTPATIENT)
Dept: RADIATION THERAPY | Age: 56
Discharge: HOME | End: 2021-09-24

## 2021-09-27 ENCOUNTER — HOSPITAL ENCOUNTER (OUTPATIENT)
Dept: RADIATION THERAPY | Age: 56
Discharge: HOME | End: 2021-09-27

## 2021-09-28 ENCOUNTER — HOSPITAL ENCOUNTER (OUTPATIENT)
Dept: RADIATION THERAPY | Age: 56
Discharge: HOME | End: 2021-09-28

## 2021-09-29 ENCOUNTER — HOSPITAL ENCOUNTER (OUTPATIENT)
Dept: RADIATION THERAPY | Age: 56
Discharge: HOME | End: 2021-09-29

## 2021-09-30 ENCOUNTER — HOSPITAL ENCOUNTER (OUTPATIENT)
Dept: RADIATION THERAPY | Age: 56
Discharge: HOME | End: 2021-09-30

## 2021-10-01 ENCOUNTER — HOSPITAL ENCOUNTER (OUTPATIENT)
Dept: RADIATION THERAPY | Age: 56
Discharge: HOME | End: 2021-10-01

## 2021-10-04 ENCOUNTER — HOSPITAL ENCOUNTER (OUTPATIENT)
Dept: RADIATION THERAPY | Age: 56
Discharge: HOME | End: 2021-10-04

## 2021-10-05 ENCOUNTER — HOSPITAL ENCOUNTER (OUTPATIENT)
Dept: RADIATION THERAPY | Age: 56
Discharge: HOME | End: 2021-10-05

## 2021-10-06 ENCOUNTER — HOSPITAL ENCOUNTER (OUTPATIENT)
Dept: RADIATION THERAPY | Age: 56
Discharge: HOME | End: 2021-10-06

## 2021-10-06 DIAGNOSIS — D05.12 CANCER OF BREAST, INTRADUCTAL, LEFT: ICD-10-CM

## 2021-10-06 DIAGNOSIS — R26.81 UNSTEADY GAIT: ICD-10-CM

## 2021-10-06 DIAGNOSIS — R42 DIZZINESS: Primary | ICD-10-CM

## 2021-10-07 ENCOUNTER — HOSPITAL ENCOUNTER (OUTPATIENT)
Dept: RADIATION THERAPY | Age: 56
Discharge: HOME | End: 2021-10-07

## 2021-10-08 ENCOUNTER — HOSPITAL ENCOUNTER (OUTPATIENT)
Dept: RADIATION THERAPY | Age: 56
Discharge: HOME | End: 2021-10-08

## 2021-10-11 ENCOUNTER — HOSPITAL ENCOUNTER (OUTPATIENT)
Dept: RADIATION THERAPY | Age: 56
Discharge: HOME | End: 2021-10-11

## 2021-10-13 ENCOUNTER — TRANSCRIBE ORDER (OUTPATIENT)
Dept: SCHEDULING | Age: 56
End: 2021-10-13

## 2021-10-13 DIAGNOSIS — C50.412 MALIGNANT NEOPLASM OF UPPER-OUTER QUADRANT OF LEFT FEMALE BREAST (HCC): ICD-10-CM

## 2021-10-13 DIAGNOSIS — R42 DIZZINESS: Primary | ICD-10-CM

## 2021-10-21 ENCOUNTER — HOSPITAL ENCOUNTER (OUTPATIENT)
Dept: MRI IMAGING | Age: 56
Discharge: HOME | End: 2021-10-21
Attending: INTERNAL MEDICINE
Payer: COMMERCIAL

## 2021-10-21 DIAGNOSIS — R42 DIZZINESS: ICD-10-CM

## 2021-10-21 DIAGNOSIS — C50.412 MALIGNANT NEOPLASM OF UPPER-OUTER QUADRANT OF LEFT FEMALE BREAST (HCC): ICD-10-CM

## 2021-10-21 PROCEDURE — 74011250636 HC RX REV CODE- 250/636: Performed by: INTERNAL MEDICINE

## 2021-10-21 PROCEDURE — A9575 INJ GADOTERATE MEGLUMI 0.1ML: HCPCS | Performed by: INTERNAL MEDICINE

## 2021-10-21 PROCEDURE — 70553 MRI BRAIN STEM W/O & W/DYE: CPT

## 2021-10-21 RX ORDER — GADOTERATE MEGLUMINE 376.9 MG/ML
12 INJECTION INTRAVENOUS
Status: COMPLETED | OUTPATIENT
Start: 2021-10-21 | End: 2021-10-21

## 2021-10-21 RX ADMIN — GADOTERATE MEGLUMINE 12 ML: 376.9 INJECTION INTRAVENOUS at 11:51

## 2021-10-25 DIAGNOSIS — E78.00 HYPERCHOLESTEROLEMIA: Primary | ICD-10-CM

## 2021-10-29 ENCOUNTER — TELEPHONE (OUTPATIENT)
Dept: SURGERY | Age: 56
End: 2021-10-29

## 2021-10-29 NOTE — TELEPHONE ENCOUNTER
Patient called and wanted to know if it was ok to get shots and if she should avoid the cancer side. I called back and there was no answer. I let her know that it is ok to get booster shot and flu shot and to get them on the opposite side from her cancer.

## 2021-11-12 NOTE — PROGRESS NOTES
Transition of Care Plan:        · Admitted/Hospitalist Service Dx:  Cellulitis, ENT/Otolaryngology Consult  · Continues on IVF, IV Vancomycin, and IV Zosyn  · CM discussed role in providing support for discharge planning  · Follow up PCP office appointment    Reason for Admission:   Cellulitis/left ear chondritis                   RUR Score:          [facility] 7/0/0            Plan for utilizing home health:      TBD                    Current Advanced Directive/Advance Care Plan:   No advance care plan                         Care Management Interventions  PCP Verified by CM: Yes(Dr. Kelly Silvestre)  Last Visit to PCP: 11/06/19  Palliative Care Criteria Met (RRAT>21 & CHF Dx)?: No  Transition of Care Consult (CM Consult): Discharge Planning(RRAT 7/0/0  [facility]  Discharge planning)  MyChart Signup: No  Discharge Durable Medical Equipment: No  Health Maintenance Reviewed: Yes  Physical Therapy Consult: No  Occupational Therapy Consult: No  Speech Therapy Consult: No  Current Support Network: Lives with Spouse(Independent, Infectious Disease MD that covers [facility],)  Confirm Follow Up Transport: Self(Self)    Antonio Soni is a 63year old female to [facility] [place] with left ear chondritis and cellulitis with neck swelling. IVF and IV antibiotics started. Anticipate discharge to home on PO antibiotics. Verified face sheet and demographics.     Frantz Cabrera RN, BSN, [facility]  ED Care Management  [phone] DISPLAY PLAN FREE TEXT

## 2021-11-18 ENCOUNTER — HOSPITAL ENCOUNTER (OUTPATIENT)
Dept: RADIATION THERAPY | Age: 56
Discharge: HOME | End: 2021-11-18

## 2021-12-26 ENCOUNTER — OFFICE VISIT (OUTPATIENT)
Dept: URGENT CARE | Age: 56
End: 2021-12-26
Payer: COMMERCIAL

## 2021-12-26 VITALS — HEART RATE: 83 BPM | RESPIRATION RATE: 18 BRPM | TEMPERATURE: 97.3 F | OXYGEN SATURATION: 98 %

## 2021-12-26 DIAGNOSIS — Z20.822 ENCOUNTER FOR LABORATORY TESTING FOR COVID-19 VIRUS: Primary | ICD-10-CM

## 2021-12-26 LAB — SARS-COV-2 POC: NEGATIVE

## 2021-12-26 PROCEDURE — S9083 URGENT CARE CENTER GLOBAL: HCPCS | Performed by: FAMILY MEDICINE

## 2021-12-26 PROCEDURE — 87426 SARSCOV CORONAVIRUS AG IA: CPT | Performed by: NURSE PRACTITIONER

## 2022-01-19 DIAGNOSIS — Z13.1 SCREENING FOR DIABETES MELLITUS (DM): Primary | ICD-10-CM

## 2022-01-19 NOTE — TELEPHONE ENCOUNTER
Per Dr. Stephen Redman requesting AIC and Lipid panel blood orders.  Ok to given Per Dr. Katarina Hodge

## 2022-01-29 ENCOUNTER — TRANSCRIBE ORDER (OUTPATIENT)
Dept: SCHEDULING | Age: 57
End: 2022-01-29

## 2022-01-29 DIAGNOSIS — R42 DIZZINESS AND GIDDINESS: ICD-10-CM

## 2022-01-29 DIAGNOSIS — M54.50 LOW BACK PAIN: ICD-10-CM

## 2022-01-29 DIAGNOSIS — M85.80 OTHER SPECIFIED DISORDERS OF BONE DENSITY AND STRUCTURE, UNSPECIFIED SITE: ICD-10-CM

## 2022-01-29 DIAGNOSIS — C50.412 MALIGNANT NEOPLASM OF UPPER-OUTER QUADRANT OF LEFT FEMALE BREAST (HCC): Primary | ICD-10-CM

## 2022-02-01 ENCOUNTER — APPOINTMENT (OUTPATIENT)
Dept: PHYSICAL THERAPY | Age: 57
End: 2022-02-01

## 2022-02-02 ENCOUNTER — DOCUMENTATION ONLY (OUTPATIENT)
Dept: CASE MANAGEMENT | Age: 57
End: 2022-02-02

## 2022-02-02 ENCOUNTER — NURSE NAVIGATOR (OUTPATIENT)
Dept: CASE MANAGEMENT | Age: 57
End: 2022-02-02

## 2022-02-02 NOTE — PROGRESS NOTES
[employer]  Breast Navigator Encounter    Name:    Cristobal Loera  Age:    61 y.o. Diagnosis:   LEFT breast cancer    Interdisciplinary Team:  Med-Onc:    Dr. Daisy Sparrow   Surg-Onc:    Dr. Ritesh Hurtado  Rad-Onc:    Dr. Emili Pichardo:    :     Nurse Navigator:  Fabiana Barrera RN, BSN, [facility]      Encounter type:  [x]Patient Initiated  []Navigator Follow-up []Pre-op  []Post-op  []Check-in Prior to First Treatment []Treatment Modality Change  []Survivorship Transition []Other:       Narrative:    Could not get through to DR JOSEPH LAKHANI [facility] to make a follow-up appointment. I told her I would send an e-mail to the PSRs to call and get her scheduled. She completed radiation, is doing well. Nervous about recurrence since she is only six months after her diagnosis. I reassured her that this does get easier with time.            Fabiana Barrera RN, BSN, [facility]  Oncology Breast Navigator     [place]  [address] [place] [address] 22.  W: [phone]  F: [phone]  [email or web address]  Good Help to Those in [facility]

## 2022-02-14 DIAGNOSIS — M85.80 OSTEOPENIA DUE TO CANCER THERAPY: Primary | ICD-10-CM

## 2022-02-16 ENCOUNTER — OFFICE VISIT (OUTPATIENT)
Dept: SURGERY | Age: 57
End: 2022-02-16
Payer: COMMERCIAL

## 2022-02-16 VITALS
BODY MASS INDEX: 22.66 KG/M2 | HEART RATE: 90 BPM | DIASTOLIC BLOOD PRESSURE: 67 MMHG | SYSTOLIC BLOOD PRESSURE: 128 MMHG | WEIGHT: 132 LBS

## 2022-02-16 DIAGNOSIS — C77.3 BREAST CANCER METASTASIZED TO AXILLARY LYMPH NODE, LEFT (HCC): Primary | ICD-10-CM

## 2022-02-16 DIAGNOSIS — C50.912 BREAST CANCER METASTASIZED TO AXILLARY LYMPH NODE, LEFT (HCC): Primary | ICD-10-CM

## 2022-02-16 PROCEDURE — 99213 OFFICE O/P EST LOW 20 MIN: CPT | Performed by: SURGERY

## 2022-02-16 NOTE — PATIENT INSTRUCTIONS
Breast Cancer: Care Instructions  Your Care Instructions     Breast cancer occurs when abnormal cells grow out of control in the breast. These cancer cells can spread within the breast, to nearby lymph nodes and other tissues, and to other parts of the body. Being treated for cancer can weaken your body, and you may feel very tired. Get the rest your body needs so you can feel better. Finding out that you have cancer is scary. You may feel many emotions and may need some help coping. Seek out family, friends, and counselors for support. You also can do things at home to make yourself feel better while you go through treatment. Call the [employer] ([phone]) or visit its website at [employer] for more information. Follow-up care is a key part of your treatment and safety. Be sure to make and go to all appointments, and call your doctor if you are having problems. It's also a good idea to know your test results and keep a list of the medicines you take. How can you care for yourself at home? · Take your medicines exactly as prescribed. Call your doctor if you think you are having a problem with your medicine. You may get medicine for nausea and vomiting if you have these side effects. · Follow your doctor's instructions to relieve pain. Pain from cancer and surgery can almost always be controlled. Use pain medicine when you first notice pain, before it becomes severe. · Eat healthy food. If you do not feel like eating, try to eat food that has protein and extra calories to keep up your strength and prevent weight loss. Drink liquid meal replacements for extra calories and protein. Try to eat your main meal early. · Get some physical activity every day, but do not get too tired. Keep doing the hobbies you enjoy as your energy allows. · Do not smoke. Smoking can make your cancer worse. If you need help quitting, talk to your doctor about stop-smoking programs and medicines.  These can increase your chances of quitting for good. · Take steps to control your stress and workload. Learn relaxation techniques. ? Share your feelings. Stress and tension affect our emotions. By expressing your feelings to others, you may be able to understand and cope with them. ? Consider joining a support group. Talking about a problem with your spouse, a good friend, or other people with similar problems is a good way to reduce tension and stress. ? Express yourself through art. Try writing, crafts, dance, or art to relieve stress. Some dance, writing, or art groups may be available just for people who have cancer. ? Be kind to your body and mind. Getting enough sleep, eating a healthy diet, and taking time to do things you enjoy can contribute to an overall feeling of balance in your life and can help reduce stress. ? Get help if you need it. Discuss your concerns with your doctor or counselor. · If you are vomiting or have diarrhea:  ? Drink plenty of fluids to prevent dehydration. Choose water and other clear liquids. If you have kidney, heart, or liver disease and have to limit fluids, talk with your doctor before you increase the amount of fluids you drink. ? When you are able to eat, try clear soups, mild foods, and liquids until all symptoms are gone for 12 to 48 hours. Other good choices include dry toast, crackers, cooked cereal, and gelatin dessert, such as Jell-O.  · If you have not already done so, prepare a list of advance directives. Advance directives are instructions to your doctor and family members about what kind of care you want if you become unable to speak or express yourself. When should you call for help? Call 911 anytime you think you may need emergency care. For example, call if:    · You passed out (lost consciousness).    Call your doctor now or seek immediate medical care if:    · You have a fever.     · You have abnormal bleeding.     · You think you have an infection.     · You have new or worse pain.     · You have new symptoms, such as a cough, belly pain, vomiting, diarrhea, or a rash. Watch closely for changes in your health, and be sure to contact your doctor if:    · You are much more tired than usual.     · You have swollen glands in your armpits, groin, or neck.     · You do not get better as expected. Where can you learn more? Go to http://www.edwards.com/  Enter V321 in the search box to learn more about \"Breast Cancer: Care Instructions. \"  Current as of: December 17, 2020               Content Version: 13.0  © 6774-7463 [employer]. Care instructions adapted under license by [employer] (which disclaims liability or warranty for this information). If you have questions about a medical condition or this instruction, always ask your healthcare professional. [address] any warranty or liability for your use of this information.

## 2022-02-17 DIAGNOSIS — K52.9 GASTROENTERITIS: Primary | ICD-10-CM

## 2022-02-17 RX ORDER — METRONIDAZOLE 500 MG/1
500 TABLET ORAL 3 TIMES DAILY
Qty: 15 TABLET | Refills: 0 | Status: SHIPPED | OUTPATIENT
Start: 2022-02-17 | End: 2022-02-22

## 2022-02-17 RX ORDER — CIPROFLOXACIN 500 MG/1
500 TABLET ORAL 2 TIMES DAILY
Qty: 10 TABLET | Refills: 0 | Status: SHIPPED | OUTPATIENT
Start: 2022-02-17 | End: 2022-02-22

## 2022-02-18 ENCOUNTER — HOSPITAL ENCOUNTER (OUTPATIENT)
Dept: PHYSICAL THERAPY | Age: 57
Discharge: HOME | End: 2022-02-18
Payer: COMMERCIAL

## 2022-02-18 VITALS — WEIGHT: 133.2 LBS | HEIGHT: 64 IN | BODY MASS INDEX: 22.74 KG/M2

## 2022-02-18 PROCEDURE — 97140 MANUAL THERAPY 1/> REGIONS: CPT

## 2022-02-18 PROCEDURE — 97161 PT EVAL LOW COMPLEX 20 MIN: CPT

## 2022-02-18 NOTE — PROGRESS NOTES
Statement of Medical Necessity  Page 1 of 2      Vicenta Galicia Cynthia 1965 Today's Date: 2/18/2022 RAVI: Lifetime   Payor: CLAIRE GARCIA / Plan: SAIGE ROGER [address] / Product Type: PPO /  ME: TBD  Refills: 2                   Diagnosis  []   I97.2 Post-Mastectomy Lymphedema []   I87.2 Venous Insufficiency   [x]   I89.0 Lymphedema, other secondary L breast []   I83.019 Venous Stasis Ulcer LE, Right   []   I89.9 Unspecified Lymphatic Disorder []   I83.029 Venous Stasis Ulcer LE, Left   []   R60.9 Swelling not relieved by elevation []   Q82.0 Hereditary/ Congenital Lymphedema   []   C50.211 Malignant neoplasm of breast, Right []   G89.3  Cancer associated pain   [x]   C50.919 Malignant neoplasm of breast with lumpectomy, Left   []   L03.115 LE Cellulitis, Right   []    []   L03.116 LE Cellulitis, Left                             Vicenta Mariapilar    1965  Page 2 of 2    Physician Order for DME for Diagnosis of secondary L breast lymphedema, stage 2, as Listed on Statement of Medical Necessity, Page 1         Recommended Product:  Units Upper Extremity Rt Lt Units Lower Extremity Rt Lt    Circ-Aid, Ready Wrap, Sigvaris Arm    Inelastic binders (Circ-Aid, Farrow)  []Foot   []Below Knee   []Knee   []Thigh      Circ-Aid Ready Wrap, Sigvaris hand    Sergey Silverio, night use []Full Leg  []Lower Leg      Tribute Arm, night use    Tribute, night use  []Full Leg  []Lower Leg      Sergey Silverio Arm, night use    Nicolás Sleeve Leg/ Foot, night use     2 Gradiant Compression Sleeves & Gloves  []Custom [x] RM Arm:  [x]CCL1 [x]CCL2 []CCL3  []Custom [x] RM Glove: [x]CCL1 [x]CCL2 []CCL3    X  Gradient Compression Stockings   []Custom  []RM Lower Extremity:   []CCL1       []CCL2         []CCL3   []Knee       []Thigh        []Waist Length      Nicolás sleeve arm w/ hand, night use    Tribute Wrap, night use     2 Compression Bra  X        Compression Vest         The above patient was referred for treatment of Lymphedema due to the diagnosis indicated above. Lymphedema is a progressive and chronic condition. It may cause acute infections, muscle atrophy, discomfort, and connective tissue fibrosis with irreversible tissue damage, decreased ROM in the affected limb and diminished functional mobility possibly interfering with independence and ability to work. Recurrent infections and wound complications that commonly occur with Lymphedema often require hospitalization and extensive wound care, thus increasing medical costs. The patient has received complete decongestive therapy which includes manual lymphatic drainage, lymphedema specific exercises, compression bandaging, and hygiene/skin care. Goals of therapy are to reduce the edema and prevent re-accumulation of fluid with its complications. It is medically necessary for this patient to have daytime garments to control this condition. They will need 2 sets (one set to wear and one set to wash for adequate skin care and wearing time). Garments must be replaced every 4-6 months for effectiveness. There are no substitutes available that offer acceptable compression treatment for this Lymphedema patient. If further information is requested, please contact our certified lymphedema therapists at [phone].     [] Asha Duvall PT, MSPT, CLT-FRED [] Trae Lee MS, OTR/L, CLT [x]  Angela Donahue PT, CLT [] Melani Cotton, PTA, CLT, CSIS    Printed  Provider Name  Provider Signature  Date    Provider NPI

## 2022-02-18 NOTE — PROGRESS NOTES
[address], 1 [facility]    INITIAL EVALUATION    NAME: Mildred Phelan AGE: 61 y.o. GENDER: female  DATE: 2/18/2022  REFERRING PHYSICIAN: Oliver Iverson MD  HISTORY AND BACKGROUND:   Primary Diagnosis:  · Lymphedema, not elsewhere classified L breast (I89.0)  Other Treatment Diagnoses:   Swelling not relieved by elevation (R60.9)  · Malignant neoplasm of breast with lumpectomy (C50.919)  Date of Onset: 11/1/21  Present Symptoms and Functional Limitations: Patient reports that swelling in the L breast began in November soon after completing radiation therapy for L breast cancer. Patient had a L breast lumpectomy with ALND July 2021. She recently purchased bras in a larger size but they do not seem to provide compression or fit well. Patient reports having a decrease in L shoulder range of motion but that has improved with daily stretches. She is active and is participates in a home exercise program, which includes lifting weights. Lymphedema Life Impact Scale (LLIS): scores a 14 with 21% impairment  Past Medical History:   Past Medical History:   Diagnosis Date    [facility] [address] [facility]     Cancer ([facility] [address] [facility]     Eczema     S/P lumpectomy of breast     Ventral hernia 8/28/2014     Past Surgical History:   Procedure Laterality Date    HX BREAST LUMPECTOMY Left 7/13/2021    LEFT BREAST LUMPECTOMY WITH ULTRASOUND/LEFT BREAST SENTINEL NODE BIOPSY (1200) performed by Ilia Kirkpatrick MD at [facility]  2012    HX GI      colonscopy    HX GYN      fibroid sx    HX GYN      c section    HX MYOMECTOMY  2005 & 2011     Current Medications:    Current Outpatient Medications   Medication Sig    ciprofloxacin HCl (CIPRO) 500 mg tablet Take 1 Tablet by mouth two (2) times a day for 5 days.     metroNIDAZOLE (FLAGYL) 500 mg tablet Take 1 Tablet by mouth three (3) times daily for 5 days.  letrozole (FEMARA) 2.5 mg tablet Take 1 Tablet by mouth daily. To start after radiation is complete    clotrimazole-betamethasone (LOTRISONE) topical cream Apply 2 times a day to affected area    multivitamin (ONE A DAY) tablet Take 1 Tab by mouth daily. No current facility-administered medications for this encounter. Allergies: No Known Allergies     Prior Level of Function/Social/Work History/Personal factors and/or comorbidities impacting plan of care: Patient works full time and is a physician specializing in Infectious Disease. Living Situation: Lives with  and daughter in a two story house. Bedroom upstairs. Trainable Caregiver?: Yes  Mobility: Independent gait without any assistive device for community distances  Sleeping Arrangement:  in bed at night  Adaptive Equipment Owned: not assessed  [employer] Addressed (if needed): N/A    Previous Therapy:  None  Compression/Lymphedema Equipment: None    SUBJECTIVE:   Patient reports having L breast swelling since November and the swelling seems to be progressing. She had to purchase new bras but they do not seem to fit well.      Patients goals for therapy: reduce swelling in the L breast. .   Pain Level: 0/10  OBJECTIVE DATA SUMMARY:   EXAMINATION/PRESENTATION/DECISION MAKING:   Pain:  Pain Scale 1: Numeric (0 - 10)  Pain Intensity 1: 0    Self-Care and ADLs: Independent    Skin and Tissue Assessment:  Dermal Status: Intact and Scars s/p L breast lumpectomy    Texture/Consistency: Boggy/Brawny L breast     Pigmentation/Color Change: Radiation related skin discoloration L anterior chest    Anomalies: N/A    Circulatory: Vascular studies ruled out DVT in past L UE 2/10/20    Nails: Normal    Stemmers Sign: Negative    Height:  Height: 5' 4\" (162.6 cm)  Weight:  Weight: 60.4 kg (133 lb 3.2 oz)   BMI:  BMI (calculated): 22.9  (36 or greater: adversely affecting lymphedema)  Wound/Ulcer:  None        Volumetric Measurements:   Right: 2,217.04 mL Left:  2,160.69 mL   % Difference: 2.54% R UE > L UE Dominance: Right hand dominant     Range of Motion: within functional limits all extremities; tightness reported L anterior chest and upper arm with end range of shoulder motion. Strength: Not formally assessed 2* patient is able to complete all functional activities in the clinic today. Sensation:  Intact     Mobility:    Bed/Chair Mobility: Independent  Transfers: Independent  Gait: Independent  Endurance: Good  Other:     Safety:  Patient is alert and oriented: Yes  Safety awareness: Intact  Fall risk?: Low  Patient given written fall prevention handout: Yes    Based on the above components, the patient evaluation is determined to be of the following complexity level: LOW     Evaluation Time: 8:00-8:20 am    TREATMENT PROVIDED:   1. Treatment description:  Manual Therapy: Patient instructed in skin care principles and anatomy of the lymphatic system. Therapist able to demonstrate manual lymphatic drainage techniques for the drainage of L breast. Initiated education in  self manual lymphatic drainage and the stationary [place] hand technique for reduction of swelling and to soften tissue. Patient's current bra was inspected and the bra is loose fitting and does not provide compression. Discussed the role and benefit of wearing a full coverage compression bra +/- a foam padding product for management of swelling and provided patient with samples of products during the visit today. Patient voiced interest in obtaining: one JoviPak post lumpectomy pad size medium to wear in conjunction with a [employer] compression bra in the size large. Provided patient with one piece of Komprex 2 (approximately 8\" X 10\") and one chip bag as an alternative option to wear with compression bra/shirt for a few hours each day or nightly as tolerated and instructed patient in use of the products.   Patient is participating in a daily exercise program, which includes lifting weights. Provided patient with written information in activity restrictions and exercise guidelines and to limit weight and/or repetitions with any increase in swelling. Discussed the risk of L arm and hand swelling following treatment for breast cancer and the role and benefit of wearing compression products with high risk activities, such as travel and exercise. Measured patient for: one Juzo Soft arm sleeve size 1 max regular length and Juzo 2301 AC basic seamless gauntlet in the size small 20-30 mmHg. The order for compression products will be submitted to the vendor, [employer], for processing. Patient will return to the clinic for fitting of the products. Discussed the role and benefit of the advanced vaso-pneumatic device for management of swelling during the restorative phase of care and patient may be interested in obtaining a pump for home use. Will attempt to secure a vendor and check insurance coverage prior to next visit. Treatment time:  8:20-9:05 am  Minutes: 45  Pain Level: 0/10  ASSESSMENT:   Alisha Ziegler is a 61 y.o. female who presents with secondary L breast lymphedema, stage 2, following surgery and radiation therapy for breast cancer. Full UE volumes taken today reveal a low percentage difference with the noninvolved extremity > involved extremity. Patient is at increased risk of progressive swelling in the L breast as well as developing swelling in the L arm and hand if not managed well. Patient would benefit from education in manual lymphatic drainage, remedial lymphedema exercises, and the use of compression products for management of swelling. Patient will benefit from complete decongestive therapy (CDT) including: Manual lymphatic drainage (MLD) technique, Short-stretch textile bandages/compression system to decongest limb and Kinesiotaping as appropriate.      This care is medically necessary due to the infection risk with lymphedema and to improve functional activities. CDT is necessary to resolve swelling to allow patient to return to wearing normal clothes/footwear and prevent worsening of symptoms, such as infections and/or hospitalizations. Patient will be independent with home program strategies to allow improved ADL ability and mobility and to allow patient to return to greatest functional independence. Rehabilitation potential is considered to be Good. Factors which may influence rehabilitation potential include scarring, radiation therapy, active lifestyle. Patient will benefit from 3 to 5 physical therapy visits over 12 weeks to optimize improvement in these areas. PLAN OF CARE:   Recommendations and Planned Interventions: Manual lymph drainage/decongestive therapy, Multi-layer compression bandaging (short-stretch), Compression garment fitting/provision, Lymphedema therapeutic exercise, Education in skin care and lymphedema precautions, Self-MLD education per home program, Self-bandaging education per home program, Caregiver education as needed and Would care as needed    GOALS  Short term goals  Time frame: to be met by 3/25/22  1. Patient will demonstrate knowledge of signs/symptoms of infections/cellulitis and be independent in skin care to prevent cellulitis. 2.  Patient will demonstrate independence in lymphedema home program of therapeutic exercises to improve circulation and decongest limb to improve ADLs. 3.  Patient will tolerate multi-layer bandages (MLB) and show measureable decrease in limb volume and/or participate in the selection process to allow ordering of home compression system (daytime, nighttime garments and pump as needed). Long term goals  Time frame: to be met by 5/16/22  1. Patient will be independent with don/doff of compression system and use in order to prevent reaccumulation of fluid at discharge. 2.  Pt will be independent in self-MLD and show stable limb volumes showing decongestion and pt. ready for transition to independent restorative phase of lymphedema therapy. Patient has participated in goal setting and agrees to work toward plan of care. Patient was instructed to call if questions or concerns arise. Thank you for this referral.  Staci Conley, PT, CLT  Total timed codes: 45 minutes  1:1 Treatment time: 45 minutes    TREATMENT PLAN EFFECTIVE DATES:   2/18/2022 TO 5/15/2022  I have read the above plan of care for Vicenta Olvera. I certify the above prescribed services are required by this patient and are medically necessary. The above plan of care has been developed in conjunction with the lymphedema/physical therapist.       Physician Signature: ____________________________________Date:______________     Daiana Georges.  Harrison Fajardo MD

## 2022-03-02 ENCOUNTER — HOSPITAL ENCOUNTER (OUTPATIENT)
Dept: PHYSICAL THERAPY | Age: 57
Discharge: HOME | End: 2022-03-02
Payer: COMMERCIAL

## 2022-03-02 PROCEDURE — 97140 MANUAL THERAPY 1/> REGIONS: CPT

## 2022-03-18 ENCOUNTER — OFFICE VISIT (OUTPATIENT)
Dept: ENDOCRINOLOGY | Age: 57
End: 2022-03-18
Payer: COMMERCIAL

## 2022-03-18 VITALS
WEIGHT: 134.4 LBS | BODY MASS INDEX: 22.94 KG/M2 | DIASTOLIC BLOOD PRESSURE: 59 MMHG | HEIGHT: 64 IN | HEART RATE: 72 BPM | SYSTOLIC BLOOD PRESSURE: 107 MMHG

## 2022-03-18 DIAGNOSIS — M81.8 OSTEOPOROSIS DUE TO AROMATASE INHIBITOR: Primary | ICD-10-CM

## 2022-03-18 DIAGNOSIS — E55.9 HYPOVITAMINOSIS D: ICD-10-CM

## 2022-03-18 DIAGNOSIS — T38.6X5A OSTEOPOROSIS DUE TO AROMATASE INHIBITOR: Primary | ICD-10-CM

## 2022-03-18 PROBLEM — H61.032: Status: ACTIVE | Noted: 2020-02-12

## 2022-03-18 PROCEDURE — 99204 OFFICE O/P NEW MOD 45 MIN: CPT | Performed by: INTERNAL MEDICINE

## 2022-03-18 RX ORDER — CHOLECALCIFEROL (VITAMIN D3) 125 MCG
4000 CAPSULE ORAL
COMMUNITY

## 2022-03-18 RX ORDER — CALCIUM CARBONATE/VITAMIN D3 600 MG-125
TABLET ORAL
COMMUNITY

## 2022-03-18 NOTE — PROGRESS NOTES
Chief Complaint   Patient presents with    Osteopenia     pcp and pharmacy verified     History of Present Illness: Mildred Phelan is a 61 y.o. female who I was asked to see in consult by Dr. Sanam Linares for evaluation of osteopenia on Letrozole. Pt has hx of Breast cancer in the left breast, she is s/p resection and XRT and was started on Letrazole on 10/12/2021. Pt has a DXA scan on 21, prior to being started on the Letrazole. Her L-Spine T-Score was -1.9 with LFN -0.9, LTH -0.3, RFN -0.7, RTH +0.1. Her Frax risk score was 5.5% for major OP fracture and 0.1% for Hip fracture. Pt has no prior hx of bone fractures. No family hx of bone fractures or Osteoporosis. She has no hx of tobacco use or excessive Etoh use. Pt has no hx of calcium irregularities, or parathyroid abnormalities. No hx of renal stones. She has no hx of steroid use. She is post-menopausal at the age of 47. She did not receive HRT. She did go through IVF and had a late pregnancy. Pt takes Vitamin D 5000 units per day and Ca 1500mg per day. She is very active running 3-4 days per week and has begun to add some weight training to her regimen as well. Her BMI today was 23 with a weight of 61kg. Past Medical History:   Diagnosis Date    Breast cancer in female [facility])     Cancer ([facility] [address]     Eczema     S/P lumpectomy of breast     Ventral hernia 2014     Past Surgical History:   Procedure Laterality Date    HX BREAST LUMPECTOMY Left 2021    LEFT BREAST LUMPECTOMY WITH ULTRASOUND/LEFT BREAST SENTINEL NODE BIOPSY (1200) performed by Ilia Kirkpatrick MD at [address] [facility]  SECTION      HX GI      colonscopy    HX GYN      fibroid sx    HX GYN      c section    HX MYOMECTOMY   &      Current Outpatient Medications   Medication Sig    calcium-cholecalciferol, d3, (CALCIUM 600 + D) 600-125 mg-unit tab Take  by mouth.  1500 mg per day plus Vit Vitamin D 1000 units    cholecalciferol, vitamin D3, (Vitamin D3) 50 mcg (2,000 unit) tab Take 4,000 Units by mouth.  letrozole (FEMARA) 2.5 mg tablet Take 1 Tablet by mouth daily. To start after radiation is complete    clotrimazole-betamethasone (LOTRISONE) topical cream Apply 2 times a day to affected area    multivitamin (ONE A DAY) tablet Take 1 Tab by mouth daily. No current facility-administered medications for this visit. No Known Allergies  Family History   Problem Relation Age of Onset    Hypertension Mother     Diabetes Father     Breast Cancer Sister 61    No Known Problems Brother     No Known Problems Maternal Grandmother     No Known Problems Maternal Grandfather     No Known Problems Paternal Grandmother     No Known Problems Paternal Grandfather      Social History     Socioeconomic History    Marital status:      Spouse name: Not on file    Number of children: Not on file    Years of education: Not on file    Highest education level: Not on file   Occupational History    Not on file   Tobacco Use    Smoking status: Never Smoker    Smokeless tobacco: Never Used   Vaping Use    Vaping Use: Never used   Substance and Sexual Activity    Alcohol use: No    Drug use: No    Sexual activity: Yes     Partners: Male   Other Topics Concern    Not on file   Social History Narrative    Not on file     Social Determinants of Health     Financial Resource Strain:     Difficulty of Paying Living Expenses: Not on file   Food Insecurity:     Worried About Running Out of Food in the Last Year: Not on file    Sara of Food in the Last Year: Not on file   Transportation Needs:     Lack of Transportation (Medical): Not on file    Lack of Transportation (Non-Medical):  Not on file   Physical Activity:     Days of Exercise per Week: Not on file    Minutes of Exercise per Session: Not on file   Stress:     Feeling of Stress : Not on file   Social Connections:     Frequency of Communication with Friends and Family: Not on file    Frequency of Social Gatherings with Friends and Family: Not on file    Attends [employer] Services: Not on file    Active Member of Clubs or Organizations: Not on file    Attends Club or Organization Meetings: Not on file    Marital Status: Not on file   Intimate Partner Violence:     Fear of Current or Ex-Partner: Not on file    Emotionally Abused: Not on file    Physically Abused: Not on file    Sexually Abused: Not on file   Housing Stability:     Unable to Pay for Housing in the Last Year: Not on file    Number of [place] in the Last Year: Not on file    Unstable Housing in the Last Year: Not on file     Review of Systems:  - Constitutional Symptoms: no fevers, chills, weight loss  - Eyes: no blurry vision or double vision  - Cardiovascular: no chest pain or palpitations  - Respiratory: no cough or shortness of breath  - Gastrointestinal: no dysphagia or abdominal pain  - Musculoskeletal: no joint pains or weakness  - Integumentary: no rashes  - Neurological: no numbness, tingling, or headaches  - Psychiatric: no depression or anxiety  - Endocrine: no heat or cold intolerance, no polyuria or polydipsia    Physical Examination:  Blood pressure (!) 107/59, pulse 72, height 5' 4\" (1.626 m), weight 134 lb 6.4 oz (61 kg).   - General: pleasant, no distress, good eye contact  - HEENT: no exopthalmos, no periorbital edema, no scleral/conjunctival injection, EOMI, no lid lag or stare  - Neck: supple, no thyromegaly, masses, lymph nodes, or carotid bruits, no supraclavicular or dorsocervical fat pads  - Cardiovascular: regular, normal rate, normal S1 and S2, no murmurs/rubs/gallops, 2+ dorsalis pedis pulses bilaterally  - Respiratory: clear to auscultation bilaterally  - Gastrointestinal: soft, nontender, nondistended, no masses, no hepatosplenomegaly  - Musculoskeletal: no proximal muscle weakness in upper or lower extremities  - Integumentary: no acanthosis nigricans, no rashes, no edema,   - Neurological: reflexes 2+ at biceps, no tremor  - Psychiatric: normal mood and affect    Data Reviewed:   Bone Mineral Density     Indication: Screening for osteoporosis  Age: 61  Sex: Female.     Menopause status: postmenopausal.  Hormone replacement therapy: None      Number of falls in the past year: None. Risk factors for osteoporosis: None.       Current medication for osteoporosis: Calcium and vitamin D. Comparison: None. Technique: Imaging was performed on the [employer].  World Health Organization  meta-analysis fracture risk calculator (FRAX) analysis was performed for 10 year  fracture risk probability assessment     Excluded sites: 0      Findings:        Femoral Neck Left:  Bone mineral density (gm/cm2): 0.908 g/cm?  % of peak bone mass: 88%  % for age matched controls: 104%  T-score: -0.9   Z-score: 0.3      Femoral Neck Right:  Bone mineral density (gm/cm2): 0.935 g/cm?  % of peak bone mass: 90%  % for age matched controls:  107%  T-score: -0.7   Z-score: 0.4      Total Hip Left:  Bone mineral density (gm/cm2): 0.971 g/cm?  % of peak bone mass: 96%  % for age matched controls: 108%  T-score: -0.3   Z-score: 0.6      Total Hip Right:  Bone mineral density (gm/cm2): 1.021 g/cm?  % of peak bone mass: 101%  % for age matched controls:  113%  T-score: 0.1   Z-score: 1.0      Lumbar Spine: L1-4 or specify  Bone mineral density (gm/cm2): 0.965 g/cm?  % of peak bone mass: 81%  % for age matched controls: 91%  T-score: -1.9   Z-score: -0.8      IMPRESSION     This patient is osteopenic using the World Health Organization criteria  10 year probability of major osteoporotic fracture: 6.2    10 year probability of hip fracture: 0.3      Component      Latest Ref Rng & Units 7/7/2021   Sodium      136 - 145 mmol/L 139   Potassium      3.5 - 5.1 mmol/L 4.0   Chloride      97 - 108 mmol/L 103   CO2      21 - 32 mmol/L 27   Anion gap      5 - 15 mmol/L 9   Glucose      65 - 100 mg/dL 77   BUN 6 - 20 MG/DL 9   Creatinine      0.55 - 1.02 MG/DL 0.66   BUN/Creatinine ratio      12 - 20   14   GFR est AA      >60 ml/min/1.73m2 >60   GFR est non-AA      >60 ml/min/1.73m2 >60   Calcium      8.5 - 10.1 MG/DL 9.8   Bilirubin, total      0.2 - 1.0 MG/DL 0.5   ALT      12 - 78 U/L 27   AST      15 - 37 U/L 21   Alk. phosphatase      45 - 117 U/L 90   Protein, total      6.4 - 8.2 g/dL 7.6   Albumin      3.5 - 5.0 g/dL 4.3   Globulin      2.0 - 4.0 g/dL 3.3   A-G Ratio      1.1 - 2.2   1.3   Vitamin D 25-Hydroxy      30 - 100 ng/mL 43.1     Assessment/Plan:   1. Osteoporosis due to aromatase inhibitor    2. Hypovitaminosis D    1) Prior to her starting the Letrazole she did not have any risk factors for fracture or osteoporosis. She is very physically active and taking Vitamin D and Ca. Her Frax risk was 5.5% risk of major OP fracture and 0.1% risk of hip fracture. At this point I do not feel that she would be a candidate for anti-resoptive therapy or need for \"preventative therapy\". I recommend she continue the Ca/Vit C and weight bearing exercise. We agreed to repeat her DXA in October 2022 (one year after starting Letrazole) and if her T-Scores or Frax show significant decline, then we discussed starting bisphosphonate (data on AI induced bone loss the prolia was not favored for multiple spinal fractures and her lowest T-score was in the L-Spine) at that time. 2) Pt to continue the Vitamin D 5000 units daily, will monitor her Vitamin D level. Pt voices understanding and agreement with the plan. RTC October 2022.     Copy sent to:  Dr. Willams Puls

## 2022-03-18 NOTE — LETTER
3/18/2022    Patient: Pedro Clark   YOB: 1965   Date of Visit: 3/18/2022     Yessica Ruiz, [address] [place] [address]    Dear Yessica Ruiz MD,      Thank you for referring Ms. Vicenta Olvera to [address] [place] [address] for evaluation. My notes for this consultation are attached. If you have questions, please do not hesitate to call me. I look forward to following your patient along with you.       Sincerely,    Janay Dominguez MD

## 2022-03-19 DIAGNOSIS — B30.9 ACUTE VIRAL CONJUNCTIVITIS, UNSPECIFIED LATERALITY: Primary | ICD-10-CM

## 2022-03-19 PROBLEM — L03.221 CELLULITIS OF NECK: Status: ACTIVE | Noted: 2020-02-12

## 2022-03-19 PROBLEM — N63.20 MASS OF LEFT BREAST: Status: ACTIVE | Noted: 2021-06-14

## 2022-03-19 PROBLEM — L03.90 CELLULITIS: Status: ACTIVE | Noted: 2020-02-09

## 2022-03-19 PROBLEM — C50.419 MALIGNANT NEOPLASM OF UPPER-OUTER QUADRANT OF FEMALE BREAST (HCC): Status: ACTIVE | Noted: 2021-06-21

## 2022-03-19 RX ORDER — CIPROFLOXACIN HYDROCHLORIDE 3.5 MG/ML
SOLUTION/ DROPS TOPICAL
Qty: 5 ML | Refills: 0 | Status: SHIPPED | OUTPATIENT
Start: 2022-03-19 | End: 2022-05-10 | Stop reason: SDUPTHER

## 2022-03-25 ENCOUNTER — APPOINTMENT (OUTPATIENT)
Dept: PHYSICAL THERAPY | Age: 57
End: 2022-03-25
Payer: COMMERCIAL

## 2022-03-29 ENCOUNTER — HOSPITAL ENCOUNTER (OUTPATIENT)
Dept: PHYSICAL THERAPY | Age: 57
Discharge: HOME | End: 2022-03-29
Payer: COMMERCIAL

## 2022-03-29 PROCEDURE — 97140 MANUAL THERAPY 1/> REGIONS: CPT

## 2022-03-29 NOTE — PROGRESS NOTES
LYMPHEDEMA DISCHARGE NOTE - [facility] 2-15    Patient Name: Lulu Cabral  Date:3/29/2022  : 1965  [x]  Patient  Verified  Payor: Susi Garcia / [facility] [address] / Product Type: PPO /    In time: 7:45 am Out time: 8:45 am  Total Treatment Time (min): 60  Total Timed Codes (min): 60  1:1 Treatment Time ( only): 60  Visit #:  3    Treatment Area: Lymphedema, not elsewhere classified [I89.0]; L breast     SUBJECTIVE  Pain Level (0-10 scale): no pain reported today  Any medication changes, allergies to medications, adverse drug reactions, diagnosis change, or new procedure performed?: [x] No    [] Yes (see summary sheet for update)  Subjective functional status/changes:   [x] No changes reported    Patient received the compression products ordered last visit and is wearing the compression bra today. Patient reports that the bra feels snug and may be too difficult to wear all day. OBJECTIVE    0 min Therapeutic Exercise:  [] Berneda Bodily Exercises [x] Remedial Lymphedema Exercise Program [] Axillary Web Exercise Program      [] Shoulder ROM Exercises   Rationale: Activate muscle pump to improve lymphatic fluid movement and decrease swelling to improve the patients ability to perform ADL and IADL skills and prevent worsening of swelling over time. 60 min Manual Therapy    Kinesiotaping: Patient did not feel that the kinesiotape was beneficial last visit. Deferred today. Manual Lymphatic Drainage (MLD):  Area to decongest: L anterior trunk/breast   Sequence used and effectiveness: Secondary sequence for upper extremity with trunk involvement. Focus on the L breast: anterior and posterior axillo-axillary anastomosis, anterior axillo-inguinal anastomosis, fibrotic technique to the L breast, and deep abdominal breathing techniques. Educated patient in L breast pump technique. Patient has the self MLD packet and is performing techniques at home.     Skin/wound care/debridement: Patient is performing daily skin care with Remedy lotion and a lotion without Parabens purchased from [employer]. Applied multi-layer compression bandaging to: Deferred       Upper/Lower Extremity Compression:     Patient received the following compression products: one Juzo Soft arm sleeve size 1 max/regular length and Juzo 2301 AC basic seamless gauntlet in the size small 20-30 mmHg to wear with high risk activities, such as travel and exercise, and one  [facility] compression bra in the size large. Patient did not bring the garments for the L arm and hand to the clinic for inspection but reports that the sleeve fits well but she contacted the vendor, [employer], and exchanged the hand piece for size medium for better fit and comfort. Patient preferred not to order the [facility] post lumpectomy pad at this time. Patient has not met her insurance deductible and preferred to pay a cash price for compression products. Provided patient with one piece of Komprex 2 (approximately 8\" X 10\") and one chip bag and discussed wearing the chip bag or foam for a few hours each day in conjunction with the compression bra. Donated patient a full coverage [employer] compression bra in the size XL during a previous visit. Discussed wearing the bra with foam padding on a nightly basis for management of swelling. Patient encouraged to wear compression products each day for better management of swelling. Rationale: increase tissue extensibility and decrease edema  to improve the patients ability to better manage of swelling during the restorative phase of care. 0   Vasopneumatic Device:   Patient is not interested in having a pump trial at this time but may be interested in a future visit. Rationale: To improve lymphatic fluid movement and decrease swelling to improve the patients ability to perform ADL and IADL skills and prevent worsening of swelling over time.     With [] TE   [] TA   [x] MT   [] SC   [] other: Patient Education: [x] Review HEP  - Active ROM routine instructions   [x] MLD Patient Education Reviewed with patient, as well as demonstration and instruction during MLD portion of the session. Continued education in self MLD technique with bathing and skin care. Provided patient with the written instructions to follow. [] Progressed/Changed HEP based on:   [] positioning   [] Kinesiotape   [x] Skin care   [] wound care   [x] other:   []   Patient / caregiver re-demonstrated bandaging. [] Yes  [] No  Compression bandaging/garment precautions reviewed: [x] Yes  [] No     Other Objective/Functional Measures:    Girth measurements of the trunk   Axilla:  87.0 cm on evaluation and 87.5 cm today. Chest:  94.5 cm on evaluation and 93.4 cm today. Waist:  93.5 cm on evaluation and 94.5 cm    L UE volumetric measurements:  L UE volumes 2,109.22 ml today compared to 2,160.69 ml on evaluation 2/18/22. Pain Level (0-10 scale) post treatment: No pain reported. ASSESSMENT/Changes in Function:   Patient has been seen in the clinic for three visits. She continues to present with swelling in the L breast following treatment for L breast cancer. She received all ordered compression products and initiated use of the compression bra today. The bra fits well and swelling would be best managed with daily and/or nightly use of compression products. Patient has been educated in self manual lymphatic drainage and is performing it daily at home. Patient has not met her insurance deductible at this time but may be interested in exploring other compression options, including the vaso-pneumatic device, in a future visit. Patient will continue with her home program to the best of her ability and return to the clinic for assessment of swelling in 6 months or sooner with any concerns. Progress towards goals / Updated goals:  Short term goals  Time frame: to be met by 3/25/22  1. Patient will demonstrate knowledge of signs/symptoms of infections/cellulitis and be independent in skin care to prevent cellulitis. Goal met 3/2/22  2. Patient will demonstrate independence in lymphedema home program of therapeutic exercises to improve circulation and decongest limb to improve ADLs. Patient is active and participating in a exercise program. Goal met 3/29/22. 3.  Patient will tolerate multi-layer bandages (MLB) and show measureable decrease in limb volume and/or participate in the selection process to allow ordering of home compression system (daytime, nighttime garments and pump as needed). Day time products ordered. Goal met 3/29/22      Long term goals  Time frame: to be met by 5/16/22  1. Patient will be independent with don/doff of compression system and use in order to prevent reaccumulation of fluid at discharge. Goal met 3/29/22  2. Pt will be independent in self-MLD and show stable limb volumes showing decongestion and pt. ready for transition to independent restorative phase of lymphedema therapy. Patient is performing self MLD and has the written instructions to follow. Provided patient with contact information for MARIAM Gorman and massage therapist, and the Judy  if she wishes to pursue manual lymphatic drainage massages on an ongoing basis. L UE volumes have remained stable. Goal met 3/29/22. PLAN  []  Upgrade activities as tolerated     []  Continue plan of care  []  Update interventions per flow sheet       [x]  Discharge due to: compression products received and goals met.    []  Other:_      Caroline Ek, PT, CLT  3/29/2022

## 2022-03-31 ENCOUNTER — NURSE NAVIGATOR (OUTPATIENT)
Dept: CASE MANAGEMENT | Age: 57
End: 2022-03-31

## 2022-03-31 NOTE — PROGRESS NOTES
[address]  Breast Navigator Encounter    Name:    Pippa Weems  Age:    61 y.o. Diagnosis:   LEFT breast cancer    Patient left message asking for order for BILATERAL diagnostic mammogram.  She has also called Dr. New Henson office, but I don't see the order in yet. I will send a message to Dr. Mike Johnson, RN and get back to the patient. ADDENDUM:  Called patient back later in the day to let her know that her order for her diagnostic mammogram was in CC. She was appreciative and will schedule.           Aldo Younger, RN, BSN, [facility]  Oncology Breast Navigator     [address]  [address] [facility] [address].  W: [phone]  F: [phone]  [email or web address]  Good Help to Those in [facility]

## 2022-04-07 ENCOUNTER — TRANSCRIBE ORDER (OUTPATIENT)
Dept: SCHEDULING | Age: 57
End: 2022-04-07

## 2022-04-07 DIAGNOSIS — C50.412 MALIGNANT NEOPLASM OF UPPER-OUTER QUADRANT OF LEFT FEMALE BREAST, UNSPECIFIED ESTROGEN RECEPTOR STATUS (HCC): Primary | ICD-10-CM

## 2022-04-14 ENCOUNTER — HOSPITAL ENCOUNTER (OUTPATIENT)
Dept: MAMMOGRAPHY | Age: 57
Discharge: HOME | End: 2022-04-14
Attending: SURGERY
Payer: COMMERCIAL

## 2022-04-14 DIAGNOSIS — C50.412 MALIGNANT NEOPLASM OF UPPER-OUTER QUADRANT OF LEFT FEMALE BREAST, UNSPECIFIED ESTROGEN RECEPTOR STATUS (HCC): ICD-10-CM

## 2022-04-14 PROCEDURE — 77062 BREAST TOMOSYNTHESIS BI: CPT

## 2022-04-20 ENCOUNTER — TRANSCRIBE ORDER (OUTPATIENT)
Dept: SCHEDULING | Age: 57
End: 2022-04-20

## 2022-04-20 DIAGNOSIS — L40.3 SAPHO SYNDROME (HCC): ICD-10-CM

## 2022-04-20 DIAGNOSIS — M65.9 SAPHO SYNDROME (HCC): ICD-10-CM

## 2022-04-20 DIAGNOSIS — M54.50 LUMBAGO: ICD-10-CM

## 2022-04-20 DIAGNOSIS — L70.9 SAPHO SYNDROME (HCC): ICD-10-CM

## 2022-04-20 DIAGNOSIS — R42 DIZZINESS AND GIDDINESS: ICD-10-CM

## 2022-04-20 DIAGNOSIS — M85.80 SAPHO SYNDROME (HCC): ICD-10-CM

## 2022-04-20 DIAGNOSIS — C50.412 MALIGNANT NEOPLASM OF UPPER-OUTER QUADRANT OF LEFT FEMALE BREAST (HCC): Primary | ICD-10-CM

## 2022-04-20 DIAGNOSIS — M86.9 SAPHO SYNDROME (HCC): ICD-10-CM

## 2022-04-27 ENCOUNTER — NURSE NAVIGATOR (OUTPATIENT)
Dept: CASE MANAGEMENT | Age: 57
End: 2022-04-27

## 2022-04-27 NOTE — PROGRESS NOTES
[address]  Breast Navigator Encounter    Name:    Lulu Cabral  Age:    61 y.o. Diagnosis:    RIGHT breast lump    Interdisciplinary Team:  Med-Onc:    Dr. Ashley Martinez  Surg-Onc:    Dr. Ebony Pacheco:      Plastics:      :      Nurse Navigator:  Abdi Gupta RN, BSN, [facility]      Encounter type:  [x]Patient Initiated  []Navigator Follow-up []Pre-op  []Post-op  []Check-in Prior to First Treatment []Treatment Modality Change  []Survivorship Transition []Other:       Narrative:    Returned patient's call today after speaking to Dr. Nurys Montoya. She feels a  RIGHT breast lump. This feels very similar to the cancer that she felt in the LEFT breast last year, and it is worrying her. Had a recent normal mammogram, but did not have an U/S of this area. I told her that Dr. Nurys Montoya was not worried after looking at her imaging, but I asked her to schedule an appt with Dr. Nurys Montoya for an U/S of the RIGHT breast in the area where she is feeling the lump. I reassured her that there are other things that you can feel in your breast which are not cancer, such as just dense islands of breast tissue. She will call Dr. Flash Ruth office for an appointment in the near future. She was appreciative.           Abdi Gupta RN, BSN, [facility]  Oncology Breast Navigator     [address]  [address] [place] [address].  W: [phone]  F: [phone]  [email or web address]  Good Help to Those in [facility]

## 2022-05-10 DIAGNOSIS — B30.9 ACUTE VIRAL CONJUNCTIVITIS, UNSPECIFIED LATERALITY: ICD-10-CM

## 2022-05-10 RX ORDER — CIPROFLOXACIN HYDROCHLORIDE 3.5 MG/ML
SOLUTION/ DROPS TOPICAL
Qty: 5 ML | Refills: 0 | Status: SHIPPED | OUTPATIENT
Start: 2022-05-10 | End: 2022-05-31

## 2022-05-11 ENCOUNTER — OFFICE VISIT (OUTPATIENT)
Dept: SURGERY | Age: 57
End: 2022-05-11
Payer: COMMERCIAL

## 2022-05-11 DIAGNOSIS — N63.11 MASS OF UPPER OUTER QUADRANT OF RIGHT BREAST: Primary | ICD-10-CM

## 2022-05-11 DIAGNOSIS — Z85.3 HISTORY OF LEFT BREAST CANCER: ICD-10-CM

## 2022-05-11 PROCEDURE — 76642 ULTRASOUND BREAST LIMITED: CPT | Performed by: SURGERY

## 2022-05-11 PROCEDURE — 99214 OFFICE O/P EST MOD 30 MIN: CPT | Performed by: SURGERY

## 2022-05-11 NOTE — PROGRESS NOTES
HISTORY OF PRESENT ILLNESS  Florin Bowen is a 61 y.o. female. HPI ESTABLISHED patient here for new RIGHT breast lumps. Has a palpable lump to the RIGHT upper, outer breast.  No pain. 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 Surgical pathology  demonstrated IDC and DCIS. COMPARISON:  [id number]    BREAST COMPOSITION: There are scattered fibroglandular densities. FINDINGS: Bilateral digital diagnostic mammography with tomosynthesis was  performed, and is interpreted in conjunction with a computer assisted detection  (CAD) system. Lumpectomy changes are noted in the left breast upper outer  quadrant. No new masses or suspicious calcifications are identified. In the  right breast there are no suspicious masses or microcalcifications. Impression  1. BI-RADS Assessment Category 2: Benign finding. Left lumpectomy scar is  benign. Recommendation:  Continued annual mammographic surveillance. The patient has been notified of these results and recommendations. Review of Systems   All other systems reviewed and are negative. Physical Exam  Vitals and nursing note reviewed. Chest:   Breasts: Breasts are symmetrical.      Right: No inverted nipple, mass, nipple discharge, skin change, tenderness, axillary adenopathy or supraclavicular adenopathy. Left: No inverted nipple, mass, nipple discharge, skin change, tenderness, axillary adenopathy or supraclavicular adenopathy.         Comments: Left breast skin changes from xrt  Right breast thickening uoq  Lymphadenopathy:      Cervical: No cervical adenopathy. Upper Body:      Right upper body: No supraclavicular, axillary or pectoral adenopathy. Left upper body: No supraclavicular, axillary or pectoral adenopathy. BREAST ULTRASOUND  Indication: Right  breast mass uoq  Technique: The area was scanned using a high-frequency linear-array near-field transducer  Findings: No abnormal mass, lesion, or shadowing noted. No cysts  Impression: Normal dense fibrocystic breast tissue   Disposition: No worrisome finding on ultrasound    ASSESSMENT and PLAN    ICD-10-CM ICD-9-CM    1. Mass of upper outer quadrant of right breast  N63.11 611.72    2. History of left breast cancer  Z85.3 V10.3      - no evidence local recurrence  - f/u in 6 months  30 minutes was spent with patient on counseling and coordination of care.

## 2022-05-11 NOTE — PATIENT INSTRUCTIONS
Breast Lumps: Care Instructions  Your Care Instructions  Breast lumps are common, especially in women between ages 27 and 48. Many women's breasts feel lumpy and tender before their menstrual period. Women also may have lumps when they are breastfeeding. Breast lumps may go away after menopause. All new breast lumps in women after menopause should be checked by a doctor. Although lumps may be normal for you, it is important to have your doctor check any lump or thickness that is not like the rest of your breast to make sure it is not cancer. A lump may be larger, harder, or different from the rest of your breast tissue. Follow-up care is a key part of your treatment and safety. Be sure to make and go to all appointments, and call your doctor if you are having problems. It's also a good idea to know your test results and keep a list of the medicines you take. How can you care for yourself at home? · Make an appointment to have a mammogram and other follow-up visits as recommended by your doctor. When should you call for help? Watch closely for changes in your health, and be sure to contact your doctor if:    · You do not get better as expected.     · Your breast has changed.     · You have pain in your breast.     · You have a discharge from your nipple.     · A breast lump changes or does not go away. Where can you learn more? Go to http://www.gray.com/  Enter Q928 in the search box to learn more about \"Breast Lumps: Care Instructions. \"  Current as of: November 22, 2021               Content Version: 13.2  © 2006-2022 Healthwise, Incorporated. Care instructions adapted under license by [employer] (which disclaims liability or warranty for this information). If you have questions about a medical condition or this instruction, always ask your healthcare professional. [address] any warranty or liability for your use of this information. Breast Lumps: Care Instructions  Your Care Instructions  Breast lumps are common, especially in women between ages 27 and 48. Many women's breasts feel lumpy and tender before their menstrual period. Women also may have lumps when they are breastfeeding. Breast lumps may go away after menopause. All new breast lumps in women after menopause should be checked by a doctor. Although lumps may be normal for you, it is important to have your doctor check any lump or thickness that is not like the rest of your breast to make sure it is not cancer. A lump may be larger, harder, or different from the rest of your breast tissue. Follow-up care is a key part of your treatment and safety. Be sure to make and go to all appointments, and call your doctor if you are having problems. It's also a good idea to know your test results and keep a list of the medicines you take. How can you care for yourself at home? · Make an appointment to have a mammogram and other follow-up visits as recommended by your doctor. When should you call for help? Watch closely for changes in your health, and be sure to contact your doctor if:    · You do not get better as expected.     · Your breast has changed.     · You have pain in your breast.     · You have a discharge from your nipple.     · A breast lump changes or does not go away. Where can you learn more? Go to http://www.gray.com/  Enter Q928 in the search box to learn more about \"Breast Lumps: Care Instructions. \"  Current as of: November 22, 2021               Content Version: 13.2  © 2006-2022 Healthwise, Incorporated. Care instructions adapted under license by [employer] (which disclaims liability or warranty for this information). If you have questions about a medical condition or this instruction, always ask your healthcare professional. [address] any warranty or liability for your use of this information.

## 2022-05-27 RX ORDER — AMOXICILLIN 500 MG/1
500 CAPSULE ORAL 3 TIMES DAILY
Qty: 30 CAPSULE | Refills: 0 | Status: SHIPPED | OUTPATIENT
Start: 2022-05-27 | End: 2022-06-06

## 2022-05-31 ENCOUNTER — OFFICE VISIT (OUTPATIENT)
Dept: URGENT CARE | Age: 57
End: 2022-05-31
Payer: COMMERCIAL

## 2022-05-31 VITALS
DIASTOLIC BLOOD PRESSURE: 68 MMHG | BODY MASS INDEX: 23.05 KG/M2 | HEART RATE: 70 BPM | TEMPERATURE: 98.7 F | WEIGHT: 135 LBS | SYSTOLIC BLOOD PRESSURE: 115 MMHG | OXYGEN SATURATION: 98 % | HEIGHT: 64 IN | RESPIRATION RATE: 16 BRPM

## 2022-05-31 DIAGNOSIS — R50.9 FEVER, UNSPECIFIED FEVER CAUSE: ICD-10-CM

## 2022-05-31 DIAGNOSIS — U07.1 COVID-19: Primary | ICD-10-CM

## 2022-05-31 DIAGNOSIS — R05.9 COUGH: ICD-10-CM

## 2022-05-31 DIAGNOSIS — Z11.59 SCREENING FOR VIRAL DISEASE: ICD-10-CM

## 2022-05-31 LAB
FLUAV+FLUBV AG NOSE QL IA.RAPID: NEGATIVE
FLUAV+FLUBV AG NOSE QL IA.RAPID: NEGATIVE
SARS-COV-2 PCR, POC: POSITIVE
VALID INTERNAL CONTROL?: YES

## 2022-05-31 PROCEDURE — 87635 SARS-COV-2 COVID-19 AMP PRB: CPT | Performed by: FAMILY MEDICINE

## 2022-05-31 PROCEDURE — 87804 INFLUENZA ASSAY W/OPTIC: CPT | Performed by: FAMILY MEDICINE

## 2022-05-31 PROCEDURE — 99213 OFFICE O/P EST LOW 20 MIN: CPT | Performed by: FAMILY MEDICINE

## 2022-06-01 DIAGNOSIS — U07.1 ACUTE BRONCHITIS DUE TO COVID-19 VIRUS: Primary | ICD-10-CM

## 2022-06-01 DIAGNOSIS — U07.1 COVID-19 VIRUS INFECTION: Primary | ICD-10-CM

## 2022-06-01 DIAGNOSIS — J20.8 ACUTE BRONCHITIS DUE TO COVID-19 VIRUS: Primary | ICD-10-CM

## 2022-06-01 RX ORDER — AZITHROMYCIN 250 MG/1
TABLET, FILM COATED ORAL
Qty: 6 TABLET | Refills: 0 | Status: SHIPPED | OUTPATIENT
Start: 2022-06-01 | End: 2022-06-06

## 2022-06-01 RX ORDER — NIRMATRELVIR AND RITONAVIR 300-100 MG
KIT ORAL
Qty: 1 BOX | Refills: 0 | Status: SHIPPED | OUTPATIENT
Start: 2022-06-01

## 2022-06-01 NOTE — PROGRESS NOTES
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 No known COVID contacts but  recently sick with similar sx. Denies SOB, N/V/D. Taking Amoxicillin. The history is provided by the patient.         Past Medical History:   Diagnosis Date    [facility]     Cancer ([facility]     Eczema     Menopause     S/P lumpectomy of breast     S/P radiation therapy     Ventral hernia 2014        Past Surgical History:   Procedure Laterality Date    HX BREAST LUMPECTOMY Left 2021    LEFT BREAST LUMPECTOMY WITH ULTRASOUND/LEFT BREAST SENTINEL NODE BIOPSY (1200) [facility]    HX  SECTION      HX GI      colonscopy    HX GYN      fibroid sx    HX GYN      c section    HX MYOMECTOMY   &          Family History   Problem Relation Age of Onset    Hypertension Mother     Diabetes Father     Breast Cancer Sister 61    No Known Problems Brother     No Known Problems Maternal Grandmother     No Known Problems Maternal Grandfather     No Known Problems Paternal Grandmother     No Known Problems Paternal Grandfather         Social History     Socioeconomic History    Marital status:      Spouse name: Not on file    Number of children: Not on file    Years of education: Not on file    Highest education level: Not on file   Occupational History    Not on file   Tobacco Use    Smoking status: Never Smoker    Smokeless tobacco: Never Used   Vaping Use    Vaping Use: Never used   Substance and Sexual Activity    Alcohol use: No    Drug use: No    Sexual activity: Yes     Partners: Male   Other Topics Concern    Not on file   Social History Narrative    Not on file     Social Determinants of Health     Financial Resource Strain:     Difficulty of Paying Living Expenses: Not on file   Food Insecurity:     Worried About Running Out of Food in the Last Year: Not on file    Ran Out of Food in the Last Year: Not on file   Transportation Needs:     Lack of Transportation (Medical): Not on file    Lack of Transportation (Non-Medical): Not on file   Physical Activity:     Days of Exercise per Week: Not on file    Minutes of Exercise per Session: Not on file   Stress:     Feeling of Stress : Not on file   Social Connections:     Frequency of Communication with Friends and Family: Not on file    Frequency of Social Gatherings with Friends and Family: Not on file    Attends Anabaptism Services: Not on file    Active Member of [address] [place] [address] [employer] or Organizations: Not on file    Attends Club or Organization Meetings: Not on file    Marital Status: Not on file   Intimate Partner Violence:     Fear of Current or Ex-Partner: Not on file    Emotionally Abused: Not on file    Physically Abused: Not on file    Sexually Abused: Not on file   Housing Stability:     Unable to Pay for Housing in the Last Year: Not on file    Number of [place] in the Last Year: Not on file    Unstable Housing in the Last Year: Not on file                ALLERGIES: Patient has no known allergies. Review of Systems   Constitutional: Positive for fever. Respiratory: Positive for cough. Negative for shortness of breath. Gastrointestinal: Negative for diarrhea, nausea and vomiting. Vitals:    05/31/22 1707   BP: 115/68   Pulse: 70   Resp: 16   Temp: 98.7 °F (37.1 °C)   SpO2: 98%   Weight: 135 lb (61.2 kg)   Height: 5' 4\" (1.626 m)       Physical Exam  Vitals and nursing note reviewed. Constitutional:       General: She is not in acute distress. Appearance: She is well-developed. She is not diaphoretic.    HENT:      Right Ear: Tympanic membrane, ear canal and external ear normal.      Left Ear: Tympanic membrane, ear canal and external ear normal.      Nose: Nose normal.      Right Sinus: No maxillary sinus tenderness or frontal sinus tenderness. Left Sinus: No maxillary sinus tenderness or frontal sinus tenderness. Mouth/Throat:      Pharynx: No oropharyngeal exudate or posterior oropharyngeal erythema. Tonsils: No tonsillar abscesses. Cardiovascular:      Rate and Rhythm: Normal rate and regular rhythm. Heart sounds: Normal heart sounds. Pulmonary:      Effort: Pulmonary effort is normal. No respiratory distress. Breath sounds: No stridor. Examination of the right-lower field reveals decreased breath sounds. Examination of the left-lower field reveals decreased breath sounds. Decreased breath sounds present. No wheezing, rhonchi or rales. Lymphadenopathy:      Cervical: No cervical adenopathy. Neurological:      Mental Status: She is alert. Psychiatric:         Behavior: Behavior normal.         Thought Content: Thought content normal.         Judgment: Judgment normal.         MDM    ICD-10-CM ICD-9-CM   1. COVID-19  U07.1 079.89   2. Fever, unspecified fever cause  R50.9 780.60   3. Cough  R05.9 786.2   4. Screening for viral disease  Z11.59 V73.99       Orders Placed This Encounter    XR CHEST PA LAT     Standing Status:   Future     Number of Occurrences:   1     Standing Expiration Date:   7/1/2023     Order Specific Question:   Reason for Exam     Answer:   cough and fever x 3 days    POCT COVID-19, SARS-COV-2, PCR     Order Specific Question:   Is this test for diagnosis or screening? Answer:   Diagnosis of ill patient     Order Specific Question:   Symptomatic for COVID-19 as defined by CDC? Answer:   Yes     Order Specific Question:   Date of Symptom Onset     Answer:   5/28/2022     Order Specific Question:   Hospitalized for COVID-19? Answer:   No     Order Specific Question:   Admitted to ICU for COVID-19? Answer:   No     Order Specific Question:   Employed in healthcare setting? Answer:   Yes     Order Specific Question:   Resident in a congregate (group) care setting? Answer:   No     Order Specific Question:   Pregnant? Answer:   No     Order Specific Question:   Previously tested for COVID-19? Answer:   Unknown    AMB POC EMANUEL INFLUENZA A/B TEST      Declines Paxlovid  Quarantine x 5 days from sx onset then wear well fitting mask for an additional 5 days thereafter  Deep breathing exercises, ambulation  Tylenol prn  Increase fluids with electrolytes    If signs and symptoms become worse the pt is to go to the ER.        Results for orders placed or performed in visit on 05/31/22   POCT COVID-19, SARS-COV-2, PCR   Result Value Ref Range    SARS-COV-2 PCR, POC Positive (A) Negative   AMB POC EMANUEL INFLUENZA A/B TEST   Result Value Ref Range    VALID INTERNAL CONTROL POC Yes     Influenza A Ag POC Negative Negative    Influenza B Ag POC Negative Negative     Procedures

## 2022-06-08 DIAGNOSIS — B02.23 SHINGLES (HERPES ZOSTER) POLYNEUROPATHY: Primary | ICD-10-CM

## 2022-06-08 RX ORDER — VALACYCLOVIR HYDROCHLORIDE 1 G/1
1000 TABLET, FILM COATED ORAL 3 TIMES DAILY
Qty: 21 TABLET | Refills: 0 | Status: SHIPPED | OUTPATIENT
Start: 2022-06-08 | End: 2022-06-15

## 2022-06-13 ENCOUNTER — OFFICE VISIT (OUTPATIENT)
Dept: FAMILY MEDICINE CLINIC | Age: 57
End: 2022-06-13
Payer: COMMERCIAL

## 2022-06-13 VITALS
WEIGHT: 134 LBS | HEIGHT: 64 IN | HEART RATE: 68 BPM | OXYGEN SATURATION: 98 % | RESPIRATION RATE: 20 BRPM | BODY MASS INDEX: 22.88 KG/M2 | DIASTOLIC BLOOD PRESSURE: 67 MMHG | TEMPERATURE: 97.5 F | SYSTOLIC BLOOD PRESSURE: 109 MMHG

## 2022-06-13 DIAGNOSIS — U07.1 ACUTE BRONCHITIS DUE TO COVID-19 VIRUS: Primary | ICD-10-CM

## 2022-06-13 DIAGNOSIS — J20.8 ACUTE BRONCHITIS DUE TO COVID-19 VIRUS: Primary | ICD-10-CM

## 2022-06-13 PROBLEM — H04.129 TEAR FILM INSUFFICIENCY: Status: ACTIVE | Noted: 2020-08-05

## 2022-06-13 PROBLEM — H40.003 PREGLAUCOMA, UNSPECIFIED, BILATERAL: Status: ACTIVE | Noted: 2020-06-23

## 2022-06-13 PROBLEM — Z96.1 PSEUDOPHAKIA OF BOTH EYES: Status: ACTIVE | Noted: 2020-12-09

## 2022-06-13 PROCEDURE — 99213 OFFICE O/P EST LOW 20 MIN: CPT | Performed by: INTERNAL MEDICINE

## 2022-06-13 NOTE — PATIENT INSTRUCTIONS
Learning About Preventing COVID-19 When You Have a Weakened Immune System  What is coronavirus (COVID-19)? COVID-19 is a disease caused by a type of coronavirus. This illness was first found in December 2019. It has since spread worldwide. Coronaviruses are a large group of viruses. They cause the common cold. They also cause more serious illnesses like [place] East respiratory syndrome (MERS) and severe acute respiratory syndrome (SARS). COVID-19 is caused by a novel coronavirus. That means it's a new type that has not been seen in people before. What causes a weakened immune system? Your immune system may not work well because of certain medicines used to treat cancer or autoimmune disease. Taking steroid medicines for a long time can also weaken your immune system. Other causes include certain health problems, as well as immune problems that run in your family. Why is it important to take extra precautions? A weakened immune system makes it more likely that you'll get very sick from COVID-19. And the COVID vaccine may not work as well for you. How can you protect yourself? Follow these guidelines until your doctor tells you it's safe to stop. · Get vaccinated and boosted. · Talk to your doctor about how many doses you need. · Avoid sick people. · Wear a mask if you go into public areas, especially indoor spaces. · Avoid crowds, and try to stay 6 feet apart from other people. If you have to be in a crowded area, wear a mask, even if you're outside. · Wash your hands often. · Avoid touching your mouth, nose, and eyes. · Ask the people you live with or who are in close contact with you to get vaccinated and boosted. · Ask the people you live with to wear a mask in public areas, even if they're fully vaccinated and boosted. If you think you've been exposed, call your doctor as soon as you can. Your doctor may have you take medicine to help prevent serious illness.  Get a COVID-19 test. You may need to be tested more than once. Where can you learn more? Go to http://www.gray.com/  Enter C127 in the search box to learn more about \"Learning About Preventing COVID-19 When You Have a Weakened Immune System. \"  Current as of: October 6, 2021               Content Version: 13.2  © 3774-0781 [employer]. Care instructions adapted under license by [employer] (which disclaims liability or warranty for this information). If you have questions about a medical condition or this instruction, always ask your healthcare professional. [place] [address] any warranty or liability for your use of this information.

## 2022-06-13 NOTE — PROGRESS NOTES
Chief Complaint   Patient presents with    Form Completion     HPI:  Jose R Blank is a 61 y.o. female presents for clearance form completion to return to work. Patient tested positive for Covid -19 2022. She was treated while quarantining at home from 22 to 2022. She did return to work 22 on full duty. Review of Systems  As per hpi    Past Medical History:   Diagnosis Date    [facility] [address] [facility]     Cancer ([facility] [address] [facility]     Eczema     Menopause     S/P lumpectomy of breast     S/P radiation therapy     Ventral hernia 2014     Past Surgical History:   Procedure Laterality Date    HX BREAST LUMPECTOMY Left 2021    LEFT BREAST LUMPECTOMY WITH ULTRASOUND/LEFT BREAST SENTINEL NODE BIOPSY (1200) performed by Daniel León MD at [facility] HX  SECTION      HX GI      colonscopy    HX GYN      fibroid sx    HX GYN      c section    HX MYOMECTOMY   &      Social History     Socioeconomic History    Marital status:    Tobacco Use    Smoking status: Never Smoker    Smokeless tobacco: Never Used   Vaping Use    Vaping Use: Never used   Substance and Sexual Activity    Alcohol use: No    Drug use: No    Sexual activity: Yes     Partners: Male     Family History   Problem Relation Age of Onset    Hypertension Mother     Diabetes Father     Breast Cancer Sister 61    No Known Problems Brother     No Known Problems Maternal Grandmother     No Known Problems Maternal Grandfather     No Known Problems Paternal Grandmother     No Known Problems Paternal Grandfather      Current Outpatient Medications   Medication Sig Dispense Refill    valACYclovir (VALTREX) 1 gram tablet Take 1 Tablet by mouth three (3) times daily for 7 days.  21 Tablet 0    nirmatrelvir-ritonavir (Paxlovid, EUA,) 150 mg x 2- 100 mg tablet 1 tab by mouth twice a day for 5 days 1 Box 0    calcium-cholecalciferol, d3, (CALCIUM 600 + D) 600-125 mg-unit tab Take by mouth. 1500 mg per day plus Vit Vitamin D 1000 units      cholecalciferol, vitamin D3, (Vitamin D3) 50 mcg (2,000 unit) tab Take 4,000 Units by mouth.  letrozole (FEMARA) 2.5 mg tablet Take 1 Tablet by mouth daily. To start after radiation is complete 90 Tablet 1    clotrimazole-betamethasone (LOTRISONE) topical cream Apply 2 times a day to affected area 45 g 1    multivitamin (ONE A DAY) tablet Take 1 Tab by mouth daily. No Known Allergies    Objective:  Visit Vitals  /67   Pulse 68   Temp 97.5 °F (36.4 °C) (Temporal)   Resp 20   Ht 5' 4\" (1.626 m)   Wt 134 lb (60.8 kg)   SpO2 98%   BMI 23.00 kg/m²     Physical Exam:   General appearance - alert, well appearing in no distress  Mental status - alert, oriented to person, place, and time  Chest - clear to auscultation, no wheezes, rales or rhonchi  Heart - normal rate, regular rhythm, no murmurs  Abdomen - soft, nontender, nondistended, no organomegaly  Ext-peripheral pulses normal, no pedal edema  Neuro -no focal findings     Results for orders placed or performed in visit on 05/31/22   POCT COVID-19, SARS-COV-2, PCR   Result Value Ref Range    SARS-COV-2 PCR, POC Positive (A) Negative   AMB POC EMANUEL INFLUENZA A/B TEST   Result Value Ref Range    VALID INTERNAL CONTROL POC Yes     Influenza A Ag POC Negative Negative    Influenza B Ag POC Negative Negative     Assessment/Plan:  Diagnoses and all orders for this visit:    Acute bronchitis due to COVID-19 virus      Patient Instructions        Learning About Preventing COVID-19 When You Have a Weakened Immune System  What is coronavirus (COVID-19)? COVID-19 is a disease caused by a type of coronavirus. This illness was first found in December 2019. It has since spread worldwide. Coronaviruses are a large group of viruses. They cause the common cold. They also cause more serious illnesses like [place] respiratory syndrome (MERS) and severe acute respiratory syndrome (SARS).  COVID-19 is caused by a novel coronavirus. That means it's a new type that has not been seen in people before. What causes a weakened immune system? Your immune system may not work well because of certain medicines used to treat cancer or autoimmune disease. Taking steroid medicines for a long time can also weaken your immune system. Other causes include certain health problems, as well as immune problems that run in your family. Why is it important to take extra precautions? A weakened immune system makes it more likely that you'll get very sick from COVID-19. And the COVID vaccine may not work as well for you. How can you protect yourself? Follow these guidelines until your doctor tells you it's safe to stop. · Get vaccinated and boosted. · Talk to your doctor about how many doses you need. · Avoid sick people. · Wear a mask if you go into public areas, especially indoor spaces. · Avoid crowds, and try to stay 6 feet apart from other people. If you have to be in a crowded area, wear a mask, even if you're outside. · Wash your hands often. · Avoid touching your mouth, nose, and eyes. · Ask the people you live with or who are in close contact with you to get vaccinated and boosted. · Ask the people you live with to wear a mask in public areas, even if they're fully vaccinated and boosted. If you think you've been exposed, call your doctor as soon as you can. Your doctor may have you take medicine to help prevent serious illness. Get a COVID-19 test. You may need to be tested more than once. Where can you learn more? Go to http://www.gray.com/  Enter C127 in the search box to learn more about \"Learning About Preventing COVID-19 When You Have a Weakened Immune System. \"  Current as of: October 6, 2021               Content Version: 13.2  © 3700-1547 [employer].    Care instructions adapted under license by [employer] (which disclaims liability or warranty for this information). If you have questions about a medical condition or this instruction, always ask your healthcare professional. [place] [address] any warranty or liability for your use of this information. Follow-up and Dispositions    · Return if symptoms worsen or fail to improve.

## 2022-06-14 ENCOUNTER — HOSPITAL ENCOUNTER (OUTPATIENT)
Dept: NUCLEAR MEDICINE | Age: 57
Discharge: HOME | End: 2022-06-14
Attending: INTERNAL MEDICINE
Payer: COMMERCIAL

## 2022-06-14 DIAGNOSIS — M81.8 OSTEOPOROSIS DUE TO AROMATASE INHIBITOR: Primary | ICD-10-CM

## 2022-06-14 DIAGNOSIS — C50.412 MALIGNANT NEOPLASM OF UPPER-OUTER QUADRANT OF LEFT FEMALE BREAST (HCC): ICD-10-CM

## 2022-06-14 DIAGNOSIS — T38.6X5A OSTEOPOROSIS DUE TO AROMATASE INHIBITOR: Primary | ICD-10-CM

## 2022-06-14 DIAGNOSIS — E55.9 HYPOVITAMINOSIS D: ICD-10-CM

## 2022-06-14 DIAGNOSIS — M54.50 LOW BACK PAIN: ICD-10-CM

## 2022-06-14 DIAGNOSIS — R42 DIZZINESS AND GIDDINESS: ICD-10-CM

## 2022-06-14 DIAGNOSIS — M85.80 OTHER SPECIFIED DISORDERS OF BONE DENSITY AND STRUCTURE, UNSPECIFIED SITE: ICD-10-CM

## 2022-06-14 PROCEDURE — 78306 BONE IMAGING WHOLE BODY: CPT

## 2022-06-16 ENCOUNTER — TRANSCRIBE ORDER (OUTPATIENT)
Dept: SCHEDULING | Age: 57
End: 2022-06-16

## 2022-06-16 DIAGNOSIS — M54.50 LOWER BACK PAIN: Primary | ICD-10-CM

## 2022-06-20 ENCOUNTER — HOSPITAL ENCOUNTER (OUTPATIENT)
Dept: MRI IMAGING | Age: 57
Discharge: HOME | End: 2022-06-20
Attending: INTERNAL MEDICINE
Payer: COMMERCIAL

## 2022-06-20 DIAGNOSIS — M54.50 LOWER BACK PAIN: ICD-10-CM

## 2022-06-20 PROCEDURE — 72157 MRI CHEST SPINE W/O & W/DYE: CPT

## 2022-06-20 PROCEDURE — 72158 MRI LUMBAR SPINE W/O & W/DYE: CPT

## 2022-06-20 PROCEDURE — 74011250636 HC RX REV CODE- 250/636: Performed by: INTERNAL MEDICINE

## 2022-06-20 PROCEDURE — A9575 INJ GADOTERATE MEGLUMI 0.1ML: HCPCS | Performed by: INTERNAL MEDICINE

## 2022-06-20 RX ORDER — GADOTERATE MEGLUMINE 376.9 MG/ML
12 INJECTION INTRAVENOUS
Status: COMPLETED | OUTPATIENT
Start: 2022-06-20 | End: 2022-06-20

## 2022-06-20 RX ADMIN — GADOTERATE MEGLUMINE 12 ML: 376.9 INJECTION INTRAVENOUS at 14:45

## 2022-06-22 DIAGNOSIS — M54.41 MIDLINE LOW BACK PAIN WITH BILATERAL SCIATICA, UNSPECIFIED CHRONICITY: Primary | ICD-10-CM

## 2022-06-22 DIAGNOSIS — M54.42 MIDLINE LOW BACK PAIN WITH BILATERAL SCIATICA, UNSPECIFIED CHRONICITY: Primary | ICD-10-CM

## 2022-06-29 ENCOUNTER — OFFICE VISIT (OUTPATIENT)
Dept: ORTHOPEDIC SURGERY | Age: 57
End: 2022-06-29
Payer: COMMERCIAL

## 2022-06-29 DIAGNOSIS — M54.50 LUMBAR BACK PAIN: ICD-10-CM

## 2022-06-29 DIAGNOSIS — M54.6 ACUTE MIDLINE THORACIC BACK PAIN: Primary | ICD-10-CM

## 2022-06-29 PROCEDURE — 97110 THERAPEUTIC EXERCISES: CPT | Performed by: PHYSICAL THERAPIST

## 2022-06-29 PROCEDURE — 97162 PT EVAL MOD COMPLEX 30 MIN: CPT | Performed by: PHYSICAL THERAPIST

## 2022-06-29 NOTE — PROGRESS NOTES
atient Name: Sang Mcduffie  Date:2022  : 1965  [x]  Patient  Verified  Payor: Lisbeth Pena / Plan: [facility] [address] / Product Type: PPO /    Total Treatment Time (min): 50+  1:1 Treatment Time ( [address] only): Ralph Bustamante MD  1. Acute midline thoracic back pain  2. Lumbar back pain      Subjective:    Patient is a 61 y.o. female referred to physical therapy by Dr. Tami Hardwick with [facility] with a diagnosis of thoracic and lumbar pain secondary to T12 vertebral compression fracture. Patient states her pain has been ongoing for approximately 4 weeks without specific mechanism for onset. She rates pain anywhere from 0 to a 5/10. She states pain has improved although certainly has not been totally eliminated. She denies any radicular complaints in the upper or lower extremities. She has previously been diagnosed with COVID but states imaging ruled out any lung involvement in her back pain. She does have a history of breast cancer for which treatment and radiation has contributed to some osteopenia as seen on bone scan. She has had an MRI of her thoracic and lumbar spine which did not show any other cancerous type lesions, but did show a compression fracture at T12 with approximately 30% loss in anterior vertebral height. There were also some findings of disc space narrowing at L5-S1. She states she has been wearing a brace for back pain and postural correction, which she feels has been helpful. She would like to be more active with regular recreational exercise to include running. She would like to ride her bike with her 8year-old daughter. She continues to have pain with prolonged sitting/standing/walking, particular greater than 2 hours.   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  She is expecting an out of country trip to [place] [facility] [place] in the next several weeks and is aware that this prolonged time in the plane may contribute to some back pain as well. She would like some advice on exercise options as well as ADL positioning. Past medical history and medication list can otherwise be reviewed per the EHR. Objective:    Patient ambulates in the clinic with a nonantalgic gait pattern. She does have a brace donned and is independent with donning and removal of the brace. Lordotic curvature noted to the lumbar spine. She is cautious but fairly pain-free with active range of motion of the trunk when assessed in seated position. Lower extremity strength testing is 5/5. Pelvic alignment is symmetrical.  Leg length is symmetrical.  Appropriate and symmetrical flexibility restrictions throughout both lower extremities. Ex: 25 min  Treatment today to include instruction in a home exercise program as well as providing patient with written and visual handouts. We discussed use of core stabilization techniques during functional transfers from supine to side-lying to sitting. We discussed activity do's and don'ts to include cardiovascular exercise. We discussed body mechanics regarding functional kneeling and squatting positioning. Man:     NMR:        All questions were addressed. Assessment:    Patient presents with increased pain and decreased ROM, strength, and mobility consistent with thoracic and lumbar pain with imaging confirmed T12 compression fracture. Long Term Goals. 8 weeks  1. Patient will report centralized thoracic/LBP to be consisitently less than or equal to 1/10 with all home/work/community/recreational ADL activity, to include standing longer than 2 hours. 2. Patient will report zero radicular pain with home/work/community/recreational ADL activity. Short Term Goals. 2 visits. 1. Patient will demonstrate independence with HEP.     Plan:  Plan of care: Physical therapy consisted of frequency of 1-2/week as needed for the next 6-8 weeks. Physical therapy will consist of therapeutic exercise, modalities, patient education, neuromuscular reeducation, manual therapy, therapeutic activity, dry needling, and instruction in home exercise program as appropriate. Eval  Ex: 25  Man:   NMR:       The referring physician has reviewed and approved this evaluation and plan of care as noted by the electronic signature attached to note.     Shakila Stinson DPT, ATC

## 2022-07-06 ENCOUNTER — HOSPITAL ENCOUNTER (OUTPATIENT)
Dept: GENERAL RADIOLOGY | Age: 57
Discharge: HOME | End: 2022-07-06
Payer: COMMERCIAL

## 2022-07-06 ENCOUNTER — OFFICE VISIT (OUTPATIENT)
Dept: ORTHOPEDIC SURGERY | Age: 57
End: 2022-07-06
Payer: COMMERCIAL

## 2022-07-06 ENCOUNTER — TRANSCRIBE ORDER (OUTPATIENT)
Dept: REGISTRATION | Age: 57
End: 2022-07-06

## 2022-07-06 DIAGNOSIS — M54.6 ACUTE MIDLINE THORACIC BACK PAIN: Primary | ICD-10-CM

## 2022-07-06 DIAGNOSIS — S22.089A T12 VERTEBRAL FRACTURE (HCC): Primary | ICD-10-CM

## 2022-07-06 DIAGNOSIS — S22.089A T12 VERTEBRAL FRACTURE (HCC): ICD-10-CM

## 2022-07-06 DIAGNOSIS — M54.50 LUMBAR BACK PAIN: ICD-10-CM

## 2022-07-06 PROCEDURE — 72100 X-RAY EXAM L-S SPINE 2/3 VWS: CPT

## 2022-07-06 PROCEDURE — 97110 THERAPEUTIC EXERCISES: CPT

## 2022-07-06 PROCEDURE — 97140 MANUAL THERAPY 1/> REGIONS: CPT

## 2022-07-06 PROCEDURE — 72072 X-RAY EXAM THORAC SPINE 3VWS: CPT

## 2022-07-06 NOTE — PROGRESS NOTES
PT DAILY TREATMENT NOTE    Patient Name: Mansoor Echols  Date:2022  : 1965  [x]  Patient  Verified  Payor: Bishnu Freitas / Plan: [facility] [address] / Product Type: PPO /    Total Treatment Time (min): 55  Referring Provider: Oren Serrano MD    1. Acute midline thoracic back pain  2. Lumbar back pain      SUBJECTIVE  Subjective functional status/changes:   [] No changes reported    Patient reports she is having some lower back pain in addition to thoracic pain. OBJECTIVE/TREATMENT    Manual Therapy x 15 mins:   Manual LE long-axis traction for lumbar decompression. Manual assistance with bilateral iliopsoas passive flexibility exercises in modified Linden test position. L sidelying for STM to B thoraco lumbar paraspinals. Neuromuscular Re-education (NMR) x   minutes:  []  Kinesiotaping for   []  Neuromuscular reeducation to the VMO with use of [place] electrical stimulation in conjunction with active contraction and exercises. []  balance/proprioceptive exercises and activities in clinic as listed below as NMR. Therapeutic Exercise x 40 mins:   Strengthening/Endurance/ADL function/Neuromuscular reeducation activities/exercises supervised and completed as listed below.       EXERCISE X= completed on  2022   UBE w/roll 3/3   1/2 foam roll walk x   pulldowns w/core org   Rowing w/core grn   bridges with band Grn   Balance board x   A/P potstirrer grn                                                       Added/Changed Exercises:  []  Advanced to address: [] functional strength/ROM deficits [] balance/proprioceptive tasks  []  Modified: [] per subjective reports [] for patient time constraints [] for clinic time constraints    Modality:  []  E-Stim: type _ x _ min     []att   []unatt   []w/ice   []w/heat  []  Ultrasound: []cont   []pulse    _ W/cm2 x _  min   []1MHz   []3MHz  []  Ice pack: post      []  Hot pack: pre    []  Other:     Patient Education: [] Review HEP    [x] Progressed/Changed HEP to include: kneeling hip flexor stretching  [] positioning   [] body mechanics   [] transfers   [] heat/ice application      ASSESSMENT    She is TTP in thoracic paraspinals. She does have some iliopsoas restrictions and is weak with PPT and core strengthening. She will be OOT x2 wks and f/u when she returns.     Progress towards goals / Updated goals:    PLAN  [x]  Upgrade activities as tolerated      [x]  Continue plan of care  []  Discharge due to:  [] Other:      Co-treatment by Maximiliano Esteban PTA 7/6/2022

## 2022-07-08 NOTE — PROGRESS NOTES
I have reviewed the notes, assessments, and/or procedures performed by Brook Fuss PTA, I concur with her/his documentation of Vicenta Olvera.

## 2022-08-02 PROBLEM — T38.6X5A OSTEOPOROSIS DUE TO AROMATASE INHIBITOR: Status: ACTIVE | Noted: 2022-08-02

## 2022-08-02 PROBLEM — M81.8 OSTEOPOROSIS DUE TO AROMATASE INHIBITOR: Status: ACTIVE | Noted: 2022-08-02

## 2022-08-03 ENCOUNTER — OFFICE VISIT (OUTPATIENT)
Dept: ORTHOPEDIC SURGERY | Age: 57
End: 2022-08-03
Payer: COMMERCIAL

## 2022-08-03 DIAGNOSIS — M54.50 LUMBAR BACK PAIN: ICD-10-CM

## 2022-08-03 DIAGNOSIS — M54.6 ACUTE MIDLINE THORACIC BACK PAIN: Primary | ICD-10-CM

## 2022-08-03 PROCEDURE — 97140 MANUAL THERAPY 1/> REGIONS: CPT

## 2022-08-03 PROCEDURE — 97112 NEUROMUSCULAR REEDUCATION: CPT

## 2022-08-03 PROCEDURE — 97110 THERAPEUTIC EXERCISES: CPT

## 2022-08-03 NOTE — PROGRESS NOTES
PT DAILY TREATMENT NOTE    Patient Name: Yolanda Palomino  Date:8/3/2022  : 1965  [x]  Patient  Verified  Payor: Charity Lorenzo / Plan: [facility] [address] / Product Type: PPO /    Total Treatment Time (min): 55  Referring Provider: Tamara Deleon MD    1. Acute midline thoracic back pain  2. Lumbar back pain      SUBJECTIVE  Subjective functional status/changes:   [] No changes reported    Patient reports lower back pain has improved but she still gets midback pain during her work day. She states being aware of her posture does help to alleviate pain. OBJECTIVE/TREATMENT    Manual Therapy x 15 mins:   Manual LE long-axis traction for lumbar decompression. Manual assistance with R iliopsoas passive flexibility exercises in modified Linden test position. L sidelying for STM to B thoraco lumbar paraspinals. MET shotgun technique. Neuromuscular Re-education (NMR) x 15 minutes:  []  Kinesiotaping for   []  Neuromuscular reeducation to the VMO with use of [place] electrical stimulation in conjunction with active contraction and exercises. [x]  Core strengthening as listed below as NMR    Therapeutic Exercise x 30 mins:   Strengthening/Endurance/ADL function/Neuromuscular reeducation activities/exercises supervised and completed as listed below.       EXERCISE X= completed on  8/3/2022   UBE w/roll 3/3   1/2 foam roll walk x   pulldowns w/core *NMR org   Rowing w/core grn   bridges with ball *NMR Grn   Balance board *NMR x   A/P potstirrer *NMR grn   LAQ on ball *NMR x   Q-ped alt LE/UE *NMR x                                               Added/Changed Exercises:  []  Advanced to address: [] functional strength/ROM deficits [] balance/proprioceptive tasks  []  Modified: [] per subjective reports [] for patient time constraints [] for clinic time constraints    Modality:  []  E-Stim: type _ x _ min     []att   []unatt   []w/ice   []w/heat  []  Ultrasound: []cont   []pulse    _ W/cm2 x _  min []1MHz   []3MHz  []  Ice pack: post      []  Hot pack: pre    []  Other:     Patient Education: [] Review HEP    [x] Progressed/Changed HEP to include: TPM w/tennis ball  [] positioning   [] body mechanics   [] transfers   [] heat/ice application      ASSESSMENT    She does require cuing for correct PPT technique, krishna with sitting/standing exercises. Hypertonic and thoracic paraspinals.      Progress towards goals / Updated goals:    PLAN  [x]  Upgrade activities as tolerated      [x]  Continue plan of care  []  Discharge due to:  [] Other:      Co-treatment by Tommy Silva PTA 8/3/2022

## 2022-08-03 NOTE — PROGRESS NOTES
I have reviewed the notes, assessments, and/or procedures performed by Dimple Thomas PTA, I concur with her/his documentation of Vicenta Olvera.

## 2022-08-15 RX ORDER — ZOLEDRONIC ACID 5 MG/100ML
5 INJECTION, SOLUTION INTRAVENOUS ONCE
Status: COMPLETED | OUTPATIENT
Start: 2022-08-22 | End: 2022-08-22

## 2022-08-17 ENCOUNTER — OFFICE VISIT (OUTPATIENT)
Dept: ORTHOPEDIC SURGERY | Age: 57
End: 2022-08-17
Payer: COMMERCIAL

## 2022-08-17 ENCOUNTER — TRANSCRIBE ORDER (OUTPATIENT)
Dept: SCHEDULING | Age: 57
End: 2022-08-17

## 2022-08-17 DIAGNOSIS — M54.6 ACUTE MIDLINE THORACIC BACK PAIN: Primary | ICD-10-CM

## 2022-08-17 DIAGNOSIS — M54.50 LUMBAR BACK PAIN: ICD-10-CM

## 2022-08-17 DIAGNOSIS — S22.009A: Primary | ICD-10-CM

## 2022-08-17 PROCEDURE — 97112 NEUROMUSCULAR REEDUCATION: CPT | Performed by: PHYSICAL THERAPIST

## 2022-08-17 PROCEDURE — 97140 MANUAL THERAPY 1/> REGIONS: CPT | Performed by: PHYSICAL THERAPIST

## 2022-08-17 NOTE — PROGRESS NOTES
PT DAILY TREATMENT NOTE    Patient Name: Mansoor Echols  WRAP:  : 1965  [x]  Patient  Verified  Payor: Rafaelguilherme Fortino / Plan: [facility] [address] / Product Type: PPO /    Total Treatment Time (min): 45  Referring Provider: Oren Serrano MD    1. Acute midline thoracic back pain  2. Lumbar back pain      SUBJECTIVE  Subjective functional status/changes:   [] No changes reported    Patient reports lower back pain has improved but she still gets midback pain during her work day. She states being aware of her posture does help to alleviate pain. OBJECTIVE/TREATMENT    Manual Therapy x 30 mins:   Mid and upper thoracic mobilization with active extension technique. Side-lying myofascial release and mobilization with extension biased to the quadratus lumborum and iliocostalis musculature. Neuromuscular Re-education (NMR) x 15 minutes:  []  Kinesiotaping for   []  Neuromuscular reeducation to the VMO with use of [place] electrical stimulation in conjunction with active contraction and exercises. [x]  Core strengthening as listed below as NMR    Therapeutic Exercise x  mins:   Strengthening/Endurance/ADL function/Neuromuscular reeducation activities/exercises supervised and completed as listed below.       EXERCISE X= completed on  2022   UBE w/roll    1/2 foam roll walk    pulldowns w/core *NMR    Rowing w/core    bridges with ball *NMR    Balance board *NMR    A/P potstirrer *NMR    LAQ on ball *NMR    Q-ped alt LE/UE *NMR                Frontal, transverse, sagittal plane rotational core strengthening via pal off press*NMR X   T-spine rotation self range of motion mobilization*NMR X                           Added/Changed Exercises:  []  Advanced to address: [] functional strength/ROM deficits [] balance/proprioceptive tasks  []  Modified: [] per subjective reports [] for patient time constraints [] for clinic time constraints    Modality:  []  E-Stim: type _ x _ min []att   []unatt   []w/ice   []w/heat  []  Ultrasound: []cont   []pulse    _ W/cm2 x _  min   []1MHz   []3MHz  []  Ice pack: post      []  Hot pack: pre    []  Other:     Patient Education: [] Review HEP    [x] Progressed/Changed HEP to include: As T-spine rotation functional core strengthening with Thera-Band  [] positioning   [] body mechanics   [] transfers   [] heat/ice application      ASSESSMENT       Overall the patient is doing well she continues to have pain when she is fatigued. She is back doing some light running continue to address functional core strengthening and activity modification.     Progress towards goals / Updated goals:    PLAN  [x]  Upgrade activities as tolerated      [x]  Continue plan of care  []  Discharge due to:  [] Other:       Ector Collet, PT 8/17/2022

## 2022-08-22 ENCOUNTER — HOSPITAL ENCOUNTER (OUTPATIENT)
Dept: INFUSION THERAPY | Age: 57
Discharge: HOME | End: 2022-08-22
Payer: COMMERCIAL

## 2022-08-22 VITALS
HEIGHT: 64 IN | WEIGHT: 135.6 LBS | SYSTOLIC BLOOD PRESSURE: 112 MMHG | TEMPERATURE: 98.2 F | OXYGEN SATURATION: 100 % | BODY MASS INDEX: 23.15 KG/M2 | HEART RATE: 59 BPM | DIASTOLIC BLOOD PRESSURE: 69 MMHG | RESPIRATION RATE: 18 BRPM

## 2022-08-22 PROCEDURE — 96374 THER/PROPH/DIAG INJ IV PUSH: CPT

## 2022-08-22 PROCEDURE — 74011000250 HC RX REV CODE- 250: Performed by: INTERNAL MEDICINE

## 2022-08-22 PROCEDURE — 74011250636 HC RX REV CODE- 250/636: Performed by: INTERNAL MEDICINE

## 2022-08-22 RX ORDER — SODIUM CHLORIDE 0.9 % (FLUSH) 0.9 %
10-40 SYRINGE (ML) INJECTION AS NEEDED
Status: DISCONTINUED | OUTPATIENT
Start: 2022-08-22 | End: 2022-08-23 | Stop reason: HOSPADM

## 2022-08-22 RX ADMIN — ZOLEDRONIC ACID 5 MG: 0.05 INJECTION, SOLUTION INTRAVENOUS at 10:00

## 2022-08-22 RX ADMIN — SODIUM CHLORIDE, PRESERVATIVE FREE 10 ML: 5 INJECTION INTRAVENOUS at 09:55

## 2022-08-22 RX ADMIN — SODIUM CHLORIDE, PRESERVATIVE FREE 10 ML: 5 INJECTION INTRAVENOUS at 10:17

## 2022-08-22 NOTE — PROGRESS NOTES
Pt arrived to [facility] ambulatory in no acute distress at 0950 for Reclast.  Assessment unremarkable. IV established in R FA without issue and positive blood return noted. Labs obtained, 8/1/22. Ca 10, Creatinine 0.69. Creatinine Clearance 85.6    Patient Vitals for the past 12 hrs:   Temp Pulse Resp BP SpO2   08/22/22 1018 -- (!) 59 18 112/69 --   08/22/22 0951 98.2 °F (36.8 °C) 64 18 116/79 100 %        The following medications administered:  Medications Administered       sodium chloride (NS) flush 10-40 mL       Admin Date  08/22/2022 Action  Given Dose  10 mL Route  IntraVENous Administered By  Benoit Aiken RN               Admin Date  08/22/2022 Action  Given Dose  10 mL Route  IntraVENous Administered By  Benoit Aiken RN              zoledronic acid (RECLAST) IVPB 5 mg       Admin Date  08/22/2022 Action  New Bag Dose  5 mg Rate  400 mL/hr Route  IntraVENous Administered By  Benoit Aiken, BAY                    Pt tolerated treatment well. Pt educated about medication. Discharge instructions reviewed. IV flushed per policy and removed, 2x2 and coban placed. Pt discharged ambulatory in no acute distress at 1020, accompanied by self. No further appointments at this time.

## 2022-08-31 ENCOUNTER — OFFICE VISIT (OUTPATIENT)
Dept: ORTHOPEDIC SURGERY | Age: 57
End: 2022-08-31
Payer: COMMERCIAL

## 2022-08-31 DIAGNOSIS — M54.6 ACUTE MIDLINE THORACIC BACK PAIN: Primary | ICD-10-CM

## 2022-08-31 DIAGNOSIS — M54.50 LUMBAR BACK PAIN: ICD-10-CM

## 2022-08-31 PROCEDURE — 97110 THERAPEUTIC EXERCISES: CPT | Performed by: PHYSICAL THERAPIST

## 2022-08-31 PROCEDURE — 97140 MANUAL THERAPY 1/> REGIONS: CPT | Performed by: PHYSICAL THERAPIST

## 2022-08-31 NOTE — PROGRESS NOTES
PT DAILY TREATMENT NOTE    Patient Name: Mariah De Paz  Date:2022  : 1965  [x]  Patient  Verified  Payor: Joann Cruz / Plan: [facility] BALBIR [facility] [address] / Product Type: PPO /    Total Treatment Time (min): 35  Referring Provider: Vincent Palomino MD    1. Acute midline thoracic back pain  2. Lumbar back pain      SUBJECTIVE  Subjective functional status/changes:   [] No changes reported    Patient reports that she still at times notices a \"knot\" along the right lower thoracic paraspinals which she is working on at home. Otherwise feels independent with exercise at home. States she has returned to riding her bike and jogging. OBJECTIVE/TREATMENT    Manual Therapy x 15 mins:   Side-lying myofascial release to the thoracic paraspinals, quadratus lumborum, and iliocostalis musculature. Scapular mobilization in side-lying to decrease thoracic and medial scapular soft tissue restrictions. Neuromuscular Re-education (NMR) x minutes:  []  Kinesiotaping for   []  Neuromuscular reeducation to the VMO with use of [place] electrical stimulation in conjunction with active contraction and exercises. [x]  Core strengthening as listed below as NMR    Therapeutic Exercise x 15 mins:   Strengthening/Endurance/ADL function/Neuromuscular reeducation activities/exercises supervised and completed as listed below. Reviewed additional options for home exercise routine to include medial scapular stretching in standing at doorway. Thoracic sidebending with arm overhead in sidelying. Reviewed questions regarding return to pickleball and recreational ADLs.        EXERCISE X= completed on  2022   UBE w/roll    1/2 foam roll walk y   pulldowns w/core *NMR    Rowing w/core    bridges with ball *NMR    Balance board *NMR    A/P potstirrer *NMR    LAQ on ball *NMR    Q-ped alt LE/UE *NMR                Frontal, transverse, sagittal plane rotational core strengthening via pal off press*NMR    T-spine rotation self range of motion mobilization*NMR                            Added/Changed Exercises:  []  Advanced to address: [] functional strength/ROM deficits [] balance/proprioceptive tasks  []  Modified: [] per subjective reports [] for patient time constraints [] for clinic time constraints    Modality:  []  E-Stim: type _ x _ min     []att   []unatt   []w/ice   []w/heat  []  Ultrasound: []cont   []pulse    _ W/cm2 x _  min   []1MHz   []3MHz  []  Ice pack: post      []  Hot pack: pre    []  Other:     Patient Education: [] Review HEP    [] Progressed/Changed HEP to include:   [] positioning   [] body mechanics   [] transfers   [] heat/ice application      ASSESSMENT    Based on subjective reports patient is doing well. States she feels ready for transition to home exercise program alone. States she is independent with ADLs and home exercise program.      PLAN  []  Upgrade activities as tolerated      []  Continue plan of care  []  Discharge to: HEP alone.    [] Other:       Alfredo Sy, PT, DPT 8/31/2022

## 2022-09-01 LAB
CHOLEST SERPL-MCNC: 175 MG/DL
GLUCOSE SERPL-MCNC: 90 MG/DL (ref 65–100)
HDLC SERPL-MCNC: 78 MG/DL
LDLC SERPL CALC-MCNC: 84.8 MG/DL (ref 0–100)
TRIGL SERPL-MCNC: 61 MG/DL (ref ?–150)

## 2022-09-10 DIAGNOSIS — H65.00 ACUTE SEROUS OTITIS MEDIA, RECURRENCE NOT SPECIFIED, UNSPECIFIED LATERALITY: Primary | ICD-10-CM

## 2022-09-10 RX ORDER — AMOXICILLIN 500 MG/1
500 CAPSULE ORAL 3 TIMES DAILY
Qty: 21 CAPSULE | Refills: 0 | Status: SHIPPED | OUTPATIENT
Start: 2022-09-10 | End: 2022-09-17

## 2022-10-01 DIAGNOSIS — M81.8 OSTEOPOROSIS DUE TO AROMATASE INHIBITOR: ICD-10-CM

## 2022-10-01 DIAGNOSIS — E55.9 HYPOVITAMINOSIS D: ICD-10-CM

## 2022-10-01 DIAGNOSIS — T38.6X5A OSTEOPOROSIS DUE TO AROMATASE INHIBITOR: ICD-10-CM

## 2022-10-03 ENCOUNTER — HOSPITAL ENCOUNTER (OUTPATIENT)
Dept: MRI IMAGING | Age: 57
Discharge: HOME | End: 2022-10-03
Attending: INTERNAL MEDICINE
Payer: COMMERCIAL

## 2022-10-03 VITALS — BODY MASS INDEX: 23.17 KG/M2 | WEIGHT: 135 LBS

## 2022-10-03 DIAGNOSIS — M65.9 SAPHO SYNDROME (HCC): ICD-10-CM

## 2022-10-03 DIAGNOSIS — M86.9 SAPHO SYNDROME (HCC): ICD-10-CM

## 2022-10-03 DIAGNOSIS — L40.3 SAPHO SYNDROME (HCC): ICD-10-CM

## 2022-10-03 DIAGNOSIS — R42 DIZZINESS AND GIDDINESS: ICD-10-CM

## 2022-10-03 DIAGNOSIS — C50.412 MALIGNANT NEOPLASM OF UPPER-OUTER QUADRANT OF LEFT FEMALE BREAST (HCC): ICD-10-CM

## 2022-10-03 DIAGNOSIS — M85.80 SAPHO SYNDROME (HCC): ICD-10-CM

## 2022-10-03 DIAGNOSIS — L70.9 SAPHO SYNDROME (HCC): ICD-10-CM

## 2022-10-03 DIAGNOSIS — M54.50 LUMBAGO: ICD-10-CM

## 2022-10-03 PROCEDURE — 74011250636 HC RX REV CODE- 250/636

## 2022-10-03 PROCEDURE — 74011000250 HC RX REV CODE- 250: Performed by: INTERNAL MEDICINE

## 2022-10-03 PROCEDURE — 74011000258 HC RX REV CODE- 258: Performed by: INTERNAL MEDICINE

## 2022-10-03 PROCEDURE — A9576 INJ PROHANCE MULTIPACK: HCPCS

## 2022-10-03 PROCEDURE — 77049 MRI BREAST C-+ W/CAD BI: CPT

## 2022-10-03 RX ORDER — SODIUM CHLORIDE 0.9 % (FLUSH) 0.9 %
10 SYRINGE (ML) INJECTION ONCE
Status: COMPLETED | OUTPATIENT
Start: 2022-10-03 | End: 2022-10-03

## 2022-10-03 RX ADMIN — SODIUM CHLORIDE 100 ML: 9 INJECTION, SOLUTION INTRAVENOUS at 13:15

## 2022-10-03 RX ADMIN — GADOTERIDOL 12 ML: 279.3 INJECTION, SOLUTION INTRAVENOUS at 13:14

## 2022-10-03 RX ADMIN — SODIUM CHLORIDE, PRESERVATIVE FREE 10 ML: 5 INJECTION INTRAVENOUS at 13:15

## 2022-10-24 DIAGNOSIS — H00.033: Primary | ICD-10-CM

## 2022-10-24 RX ORDER — DOXYCYCLINE 100 MG/1
100 CAPSULE ORAL 2 TIMES DAILY
Qty: 14 CAPSULE | Refills: 0 | Status: SHIPPED | OUTPATIENT
Start: 2022-10-24 | End: 2022-10-31

## 2022-10-24 RX ORDER — LEVOFLOXACIN 500 MG/1
500 TABLET, FILM COATED ORAL DAILY
Qty: 7 TABLET | Refills: 0 | Status: SHIPPED | OUTPATIENT
Start: 2022-10-24 | End: 2022-10-31

## 2022-10-25 ENCOUNTER — HOSPITAL ENCOUNTER (OUTPATIENT)
Dept: INFUSION THERAPY | Age: 57
Discharge: HOME | End: 2022-10-25

## 2022-11-01 ENCOUNTER — APPOINTMENT (OUTPATIENT)
Dept: PHYSICAL THERAPY | Age: 57
End: 2022-11-01

## 2022-11-04 DIAGNOSIS — L03.221 CELLULITIS OF NECK: Primary | ICD-10-CM

## 2022-11-04 RX ORDER — DOXYCYCLINE 100 MG/1
100 CAPSULE ORAL 2 TIMES DAILY
Qty: 20 CAPSULE | Refills: 0 | Status: SHIPPED | OUTPATIENT
Start: 2022-11-04 | End: 2022-11-14

## 2022-12-09 DIAGNOSIS — H00.033: ICD-10-CM

## 2022-12-09 RX ORDER — DOXYCYCLINE 100 MG/1
100 CAPSULE ORAL 2 TIMES DAILY
Qty: 14 CAPSULE | Refills: 0 | Status: SHIPPED | OUTPATIENT
Start: 2022-12-09 | End: 2022-12-16

## 2022-12-09 RX ORDER — LEVOFLOXACIN 500 MG/1
500 TABLET, FILM COATED ORAL DAILY
Qty: 7 TABLET | Refills: 0 | Status: SHIPPED | OUTPATIENT
Start: 2022-12-09 | End: 2022-12-16

## 2023-01-11 NOTE — PROGRESS NOTES
LYMPHEDEMA PT DAILY TREATMENT NOTE - [facility] 2-15    Patient Name: Corey Shaw  Date:3/2/2022  : 1965  [x]  Patient  Verified  Payor: Blaine Duke / Plan: JOHNKIKO MCGREGOR [facility] [address] / Product Type: PPO /    In time: 7:40 am Out time: 8:40 am  Total Treatment Time (min): 60  Total Timed Codes (min): 60  1:1 Treatment Time ( only): 60  Visit #:  2    Treatment Area: Lymphedema, not elsewhere classified [I89.0]; L breast     SUBJECTIVE  Pain Level (0-10 scale): no pain reported today  Any medication changes, allergies to medications, adverse drug reactions, diagnosis change, or new procedure performed?: [x] No    [] Yes (see summary sheet for update)  Subjective functional status/changes:   [x] No changes reported    Patient reports that breast swelling, texture, and skin discoloration seem to improve with self manual lymphatic drainage. OBJECTIVE    0 min Therapeutic Exercise:  [] Mingo Deutscher Exercises [] Remedial Lymphedema Exercise Program [] Axillary Web Exercise Program      [] Shoulder ROM Exercises   Rationale: Activate muscle pump to improve lymphatic fluid movement and decrease swelling to improve the patients ability to perform ADL and IADL skills and prevent worsening of swelling over time. 60 min Manual Therapy    Kinesiotaping: One Y piece of kinesiotape from L breast to supraclavicular region. Patient education provided on proper removal of kinesiotape with any adverse skin reactions. Manual Lymphatic Drainage (MLD):  Area to decongest: L anterior trunk/breast   Sequence used and effectiveness: Secondary sequence for upper extremity with trunk involvement. Focus on the L breast: anterior and posterior axillo-axillary anastomosis, anterior axillo-inguinal anastomosis, fibrotic technique to the L breast, and deep abdominal breathing techniques. Educated patient in L breast pump technique. Provided written instructions to follow.     Skin/wound care/debridement: Reviewed skin care principles:   Patient is performing daily skin care. Discussed the increased risk of infection with lymphedema. Brawny texture of the L breast.    Applied multi-layer compression bandaging to: Deferred       Upper/Lower Extremity Compression:     L anterior trunk/breast    Measured patient last visit for: Dilma Aragon post lumpectomy pad size medium to wear in conjunction with a [employer] compression bra in the size large. Also provided patient with one piece of Komprex 2 (approximately 8\" X 10\") and one chip bag to wear in conjunction with the compression bra. Patient has initiated use of the padding but reports it is too bulky. Measured patient last visit for: one Juzo Soft arm sleeve size 1 max regular length and Juzo 2301 AC basic seamless gauntlet in the size small 20-30 mmHg to wear with high risk activities, such as weightlifting/exercise. The order was submitted to the vendor, [employer], for processing. The vendor will be contacted today to follow up on the order. Vendor: [employer]    Able to donate patient one [employer] Prima compression bra in the size XL to wear daily as tolerated. Although patient may fit best in a size large, the bra fits adequately and can be comfortably worn with additional padding for management of swelling. Rationale: increase tissue extensibility and decrease edema  to improve the patients ability to better manage of swelling during the restorative phase of care. 0   Vasopneumatic Device:   Discussed the role and benefit of the advanced vaso-pneumatic device for management of swelling during the restorative phase of care. Patient voiced interest in possibly having a pump trial in a future visit. Paperwork has been submitted to the vendor, [employer], to check insurance coverage of the pump. Rationale:  To improve lymphatic fluid movement and decrease swelling to improve the patients ability to perform ADL and IADL skills and prevent worsening of swelling over time. With   [] TE   [] TA   [x] MT   [] SC   [] other: Patient Education: [x] Review HEP    [x] MLD Patient Education Reviewed with patient, as well as demonstration and instruction during MLD portion of the session. Continued education in self MLD technique with bathing and skin care. Provided patient with the written instructions to follow. [] Progressed/Changed HEP based on:   [] positioning   [x] Kinesiotape   [x] Skin care   [] wound care   [] other:   []   Patient / caregiver re-demonstrated bandaging. [] Yes  [] No  Compression bandaging/garment precautions reviewed: [] Yes  [] No     Other Objective/Functional Measures:    Girth measurements of the trunk were deferred today. Girth measurements of the trunk on evaluation were:   Axilla:  87.0 cm  Chest:  94.5 cm  Waist:  93.5 cm    Pain Level (0-10 scale) post treatment: No pain reported. ASSESSMENT/Changes in Function:   Patient is returning to the clinic today for the first visit since evaluation. Patient has initiated self manual lymphatic drainage at home and reports that skin texture, discoloration, and swelling improve with MLD. Further education in self MLD was completed today and patient demonstrates the ability to perform self MLD with good technique. Donated patient a compression bra today to wear daily as tolerated until she received compression products ordered last visit. Will schedule a pump trial for next visit. Patient will continue to benefit from skilled PT services to  address swelling, analyze and address soft tissue restrictions, analyze compression product fit and use, instruct in home lymphedema management program and modify and progress therapeutic interventions to attain remaining goals. Progress towards goals / Updated goals:  Short term goals  Time frame: to be met by 3/25/22  1.   Patient will demonstrate knowledge of signs/symptoms of infections/cellulitis and be independent in skin care to prevent cellulitis. Goal met 3/2/22  2. Patient will demonstrate independence in lymphedema home program of therapeutic exercises to improve circulation and decongest limb to improve ADLs. Patient is participating in a daily exercise program. Will educate in the remedial lymphedema exercises in a future visit. 3.  Patient will tolerate multi-layer bandages (MLB) and show measureable decrease in limb volume and/or participate in the selection process to allow ordering of home compression system (daytime, nighttime garments and pump as needed). Goal in progress.      Long term goals  Time frame: to be met by 5/16/22  1. Patient will be independent with don/doff of compression system and use in order to prevent reaccumulation of fluid at discharge. Goal in progress. 2.  Pt will be independent in self-MLD and show stable limb volumes showing decongestion and pt. ready for transition to independent restorative phase of lymphedema therapy. Goal in progress.      PLAN  [x]  Upgrade activities as tolerated     [x]  Continue plan of care  []  Update interventions per flow sheet       []  Discharge due to:_  []  Other:_      Christi Jaimes, PT, CLT  3/2/2022 11-Jan-2023 06:11

## 2023-01-19 ENCOUNTER — TELEPHONE (OUTPATIENT)
Dept: ENDOCRINOLOGY | Age: 58
End: 2023-01-19

## 2023-01-19 DIAGNOSIS — M81.8 OSTEOPOROSIS DUE TO AROMATASE INHIBITOR: ICD-10-CM

## 2023-01-19 DIAGNOSIS — E55.9 HYPOVITAMINOSIS D: Primary | ICD-10-CM

## 2023-01-19 DIAGNOSIS — T38.6X5A OSTEOPOROSIS DUE TO AROMATASE INHIBITOR: ICD-10-CM

## 2023-01-19 NOTE — TELEPHONE ENCOUNTER
Advised Renal Function panel and Vitamin D have been ordered. Dexa scan is due after 8/2023. Patient expressed understanding.

## 2023-01-19 NOTE — TELEPHONE ENCOUNTER
1/19/2023  11:23 AM      Patient would like to know what test she need to have done before her appointment which is next month.     ZY#[phone]    Thanks,  Byron Alexander

## 2023-01-19 NOTE — PROGRESS NOTES
HISTORY OF PRESENT ILLNESS  Tammie Carmona is a 60 y.o. female. HPI  ESTABLISHED patient here for 6 month follow up LEFT breast cancer. She is doing well. Reports thickening right breast upper outer quadrant. breast cancer:  6/21/21 - LEFT breast bx:  LEFT breast IDC, gr2,  ER 99%, NC 80%, HER2 neg, ki-67 30%  7/13/21 - LEFT breast lumpectomy, SNbx   T2 N1   Score 23 / Oncotype   Dr Babak Salcedo - AI, letrozole   Jaimee Drake    MRI Results (most recent):  Results from [place] encounter on 10/03/22    MRI BREAST BI W WO CONT    Narrative  HISTORY: 58-year-old female with history of left breast cancer, status post  lumpectomy in 2021, presenting for follow-up breast MRI. COMPARISON: MRI in 2021, mammography April 2022    TECHNIQUE:  Bilateral breast MRI was performed using a dedicated breast coil without  compression with the patient in the prone position. Precontrast T1-weighted  images with fat suppression were obtained followed by bolus injection of 12 mL  ProHance. Postcontrast dynamic and high-resolution images were acquired. T2-weighted axial imaging with fat suppression was also performed. The images  were analyzed using CAD analysis, enhancement curves, digital subtraction, and 2  and 3 dimensional reconstructions. FINDINGS:    Background parenchymal enhancement: Mild    Mammographic breast density: Heterogeneously dense breast parenchyma    Right breast:  There is no suspicious mass or nonmass enhancement within the right breast.  There is no skin thickening or nipple retraction. There has been no significant  change. There is no suspicious axillary or internal mammary chain lymphadenopathy. Left breast:  There are new lumpectomy changes in the middle third of the left breast upper  outer quadrant, where there is a small postoperative seroma measuring 2.9 x 1  cm.  There is no suspicious enhancement within the left breast. There is diffuse  overlying skin thickening, likely a sequela of treatment. There is no suspicious axillary or internal mammary chain lymphadenopathy. Impression  Right Breast:  1. BI-RADS Assessment Category 1: Negative. No evidence of breast carcinoma  within the right breast.    Left Breast:  1. BI-RADS Assessment Category 2: Benign finding. Expected postsurgical changes  of lumpectomy in the upper outer left breast, with a small, benign associated  postoperative seroma. 2. No evidence of breast carcinoma within the left breast.    RECOMMENDATIONS:  Recommend annual follow-up diagnostic mammography per lumpectomy protocol. Patient is due for this in April 2023. A negative breast MRI examination speaks strongly against invasive cancer down  to a detection threshold of 3 to 5 mm but may not detect some lower grade or in  situ carcinomas. Therefore, routine clinical and mammographic followup are  recommended. A summary portfolio has been created in PACS. [facility] Results (most recent):  Results from [place] encounter on 04/14/22    [facility] 3D JUSTIN W MAMMO BI DX INCL CAD    Narrative  STUDY:  Bilateral Diagnostic Digital Mammography with tomosynthesis    INDICATION:  Left lumpectomy for carcinoma in 2021. Surgical pathology  demonstrated IDC and DCIS. COMPARISON:  [id number]    BREAST COMPOSITION: There are scattered fibroglandular densities. FINDINGS: Bilateral digital diagnostic mammography with tomosynthesis was  performed, and is interpreted in conjunction with a computer assisted detection  (CAD) system. Lumpectomy changes are noted in the left breast upper outer  quadrant. No new masses or suspicious calcifications are identified. In the  right breast there are no suspicious masses or microcalcifications. Impression  1. BI-RADS Assessment Category 2: Benign finding. Left lumpectomy scar is  benign. Recommendation:  Continued annual mammographic surveillance.     The patient has been notified of these results and recommendations. Review of Systems   All other systems reviewed and are negative. Physical Exam  Vitals and nursing note reviewed. Chest:   Breasts:     Right: No swelling, bleeding, inverted nipple, mass, nipple discharge, skin change or tenderness. Left: No swelling, bleeding, inverted nipple, mass, nipple discharge, skin change or tenderness. Comments: Skin thickening and darkening left breast from xrt  Lymphadenopathy:      Upper Body:      Right upper body: No supraclavicular, axillary or pectoral adenopathy. Left upper body: No supraclavicular, axillary or pectoral adenopathy. BREAST ULTRASOUND  Indication: Right  breast mass uoq  Technique: The area was scanned using a high-frequency linear-array near-field transducer  Findings: No abnormal mass, lesion, or shadowing noted. No cysts  Impression: Normal dense fibrocystic breast tissue   Disposition: No worrisome finding on ultrasound   ASSESSMENT and PLAN    ICD-10-CM ICD-9-CM    1. Malignant neoplasm of upper-outer quadrant of left breast in female, estrogen receptor positive (HCC)  C50.412 174.4     Z17.0 V86.0       2. Fibrocystic breast changes, right  N60.11 610.1       - no evidence local recurrence  - rtc in 6 months, mammo due in April 2023  30 minutes was spent with patient on counseling and coordination of care.

## 2023-01-20 ENCOUNTER — OFFICE VISIT (OUTPATIENT)
Dept: SURGERY | Age: 58
End: 2023-01-20
Payer: COMMERCIAL

## 2023-01-20 VITALS
SYSTOLIC BLOOD PRESSURE: 115 MMHG | BODY MASS INDEX: 23.05 KG/M2 | HEART RATE: 73 BPM | WEIGHT: 135 LBS | DIASTOLIC BLOOD PRESSURE: 60 MMHG | HEIGHT: 64 IN

## 2023-01-20 DIAGNOSIS — N60.11 FIBROCYSTIC BREAST CHANGES, RIGHT: ICD-10-CM

## 2023-01-20 DIAGNOSIS — C50.412 MALIGNANT NEOPLASM OF UPPER-OUTER QUADRANT OF LEFT BREAST IN FEMALE, ESTROGEN RECEPTOR POSITIVE (HCC): Primary | ICD-10-CM

## 2023-01-20 DIAGNOSIS — Z17.0 MALIGNANT NEOPLASM OF UPPER-OUTER QUADRANT OF LEFT BREAST IN FEMALE, ESTROGEN RECEPTOR POSITIVE (HCC): Primary | ICD-10-CM

## 2023-01-20 PROCEDURE — 99214 OFFICE O/P EST MOD 30 MIN: CPT | Performed by: SURGERY

## 2023-01-20 PROCEDURE — 76642 ULTRASOUND BREAST LIMITED: CPT | Performed by: SURGERY

## 2023-01-30 DIAGNOSIS — R25.2 LEG CRAMPS: Primary | ICD-10-CM

## 2023-02-01 ENCOUNTER — OFFICE VISIT (OUTPATIENT)
Dept: ENDOCRINOLOGY | Age: 58
End: 2023-02-01
Payer: COMMERCIAL

## 2023-02-01 VITALS
HEIGHT: 64 IN | SYSTOLIC BLOOD PRESSURE: 113 MMHG | HEART RATE: 76 BPM | DIASTOLIC BLOOD PRESSURE: 71 MMHG | BODY MASS INDEX: 23.32 KG/M2 | WEIGHT: 136.6 LBS

## 2023-02-01 DIAGNOSIS — M81.8 OSTEOPOROSIS DUE TO AROMATASE INHIBITOR: Primary | ICD-10-CM

## 2023-02-01 DIAGNOSIS — E55.9 HYPOVITAMINOSIS D: ICD-10-CM

## 2023-02-01 DIAGNOSIS — T38.6X5A OSTEOPOROSIS DUE TO AROMATASE INHIBITOR: Primary | ICD-10-CM

## 2023-02-01 PROCEDURE — 99214 OFFICE O/P EST MOD 30 MIN: CPT | Performed by: INTERNAL MEDICINE

## 2023-02-01 NOTE — LETTER
2/1/2023    Patient: Kel Byrne   YOB: 1965   Date of Visit: 2/1/2023     Kellen Wade, [address]    Dear Kellen Wade MD,      Thank you for referring Ms. Vicenta Olvera to [address] [place] [address] for evaluation. My notes for this consultation are attached. If you have questions, please do not hesitate to call me. I look forward to following your patient along with you.       Sincerely,    Diana Bartlett MD

## 2023-02-01 NOTE — PROGRESS NOTES
Chief Complaint   Patient presents with    Osteoporosis     Pcp and pharmacy verified     History of Present Illness: Para Jil is a 60 y.o. female here for follow up of osteoporosis secondary to Letrozole. Pt has hx of Breast cancer in the left breast, she is s/p resection and XRT and was started on Letrazole on 10/12/2021. Pt has a DXA scan on 8/18/21, prior to being started on the Letrazole. Her L-Spine T-Score was -1.9 with LFN -0.9, LTH -0.3, RFN -0.7, RTH +0.1. Her Frax risk score was 5.5% for major OP fracture and 0.1% for Hip fracture. She developed back pain and was sent for a spinal MRI in June 2022 and that showed a T11-T12 compression fracture. We discussed treatment options for her osteoporosis and she was more comfortable with taking Zoledronic Acid and pt received infusion #1 on 8/22/22. Pt tolerated the Zoledronic Acid infusion with no problems or issues. Pt is still taking the Letrazole 2.5mg daily. Pt is taking Vitamin D 5000 units daily and Ca 1200mg daily. She is very active running 3-4 days per week and has begun to add some weight training to her regimen as well. Her BMI today was 23 with a weight of 62kg. Pt is wearing a brace for her back when she plays pickle ball. She does not have any problems or issues when she runs. She denies any falls or fractures since our last visit. She denies any issues of dysuria, hematuria or renal stone. She denies issues of oral ulcers or sores. She denies issues of eye pain or irritation. She denies any new hip/thigh pains. She is post-menopausal at the age of 47. She did not receive HRT. She did go through IVF and had a late pregnancy. Current Outpatient Medications   Medication Sig    calcium-cholecalciferol, d3, (CALCIUM 600 + D) 600-125 mg-unit tab Take  by mouth. 1500 mg per day plus Vit Vitamin D 1000 units    cholecalciferol, vitamin D3, 50 mcg (2,000 unit) tab Take 4,000 Units by mouth.     letrozole [facility]) 2.5 mg tablet Take 1 Tablet by mouth daily. To start after radiation is complete    multivitamin (ONE A DAY) tablet Take 1 Tab by mouth daily. No current facility-administered medications for this visit. No Known Allergies  Review of Systems:  - Cardiovascular: no chest pain  - Neurological: no tremors  - Integumentary: skin is normal    Physical Examination:  Blood pressure 113/71, pulse 76, height 5' 4\" (1.626 m), weight 136 lb 9.6 oz (62 kg). General: pleasant, no distress, good eye contact   Neck: no thyromegaly or thyroid bruits  Cardiovascular: regular, normal rate, nl s1 and s2, no m/r/g   Integumentary: skin is normal, no edema  Neurological: reflexes 2+ at biceps, no tremors  Psychiatric: normal mood and affect    Data Reviewed:   - none new for review    Assessment/Plan:   1) Osteoporosis > Her DXA in 2021 was osteopenic but she had a osteoporosis defining spinal compression fracture T11-T12. She has received infusion #1 of ZA in August 2022, which she tolerated with no problems or issues. Pt is still taking the Letrazole 2.5mg daily. Pt is taking the Vitamin D 5000 units per day and Ca 1200mg per day. She is maintaining an active lifestyle. Pt to continue the Ca/Vit D supplementation. She will need a repeat DXA after she has been on the reclast for 2 years. 2) Hypovitaminosis D > See #1. Pt voices understanding and agreement with the plan. RTC July 2023.     Copy sent to:  Dr. Noel Hills

## 2023-02-09 ENCOUNTER — TRANSCRIBE ORDER (OUTPATIENT)
Dept: REGISTRATION | Age: 58
End: 2023-02-09

## 2023-02-09 ENCOUNTER — HOSPITAL ENCOUNTER (OUTPATIENT)
Dept: GENERAL RADIOLOGY | Age: 58
Discharge: HOME | End: 2023-02-09
Payer: COMMERCIAL

## 2023-02-09 DIAGNOSIS — S22.009A FRACTURE OF THORACIC VERTEBRA (HCC): ICD-10-CM

## 2023-02-09 DIAGNOSIS — S22.009A FRACTURE OF THORACIC VERTEBRA (HCC): Primary | ICD-10-CM

## 2023-02-09 PROCEDURE — 72100 X-RAY EXAM L-S SPINE 2/3 VWS: CPT

## 2023-02-09 PROCEDURE — 72110 X-RAY EXAM L-2 SPINE 4/>VWS: CPT

## 2023-02-09 PROCEDURE — 72072 X-RAY EXAM THORAC SPINE 3VWS: CPT

## 2023-02-13 ENCOUNTER — TRANSCRIBE ORDER (OUTPATIENT)
Dept: SCHEDULING | Age: 58
End: 2023-02-13

## 2023-02-13 DIAGNOSIS — C50.919 BREAST CANCER (HCC): Primary | ICD-10-CM

## 2023-02-13 DIAGNOSIS — M54.50 LUMBAR PAIN: Primary | ICD-10-CM

## 2023-04-13 ENCOUNTER — HOSPITAL ENCOUNTER (OUTPATIENT)
Dept: MRI IMAGING | Age: 58
Discharge: HOME | End: 2023-04-13
Attending: NEUROLOGICAL SURGERY
Payer: COMMERCIAL

## 2023-04-13 DIAGNOSIS — C50.919 BREAST CANCER (HCC): ICD-10-CM

## 2023-04-13 DIAGNOSIS — M54.50 LUMBAR PAIN: ICD-10-CM

## 2023-04-13 PROCEDURE — 72158 MRI LUMBAR SPINE W/O & W/DYE: CPT

## 2023-04-13 PROCEDURE — A9576 INJ PROHANCE MULTIPACK: HCPCS

## 2023-04-13 PROCEDURE — 74011250636 HC RX REV CODE- 250/636

## 2023-04-13 PROCEDURE — 72157 MRI CHEST SPINE W/O & W/DYE: CPT

## 2023-04-13 RX ADMIN — GADOTERIDOL 10 ML: 279.3 INJECTION, SOLUTION INTRAVENOUS at 21:17

## 2023-04-22 DIAGNOSIS — M81.8 OSTEOPOROSIS DUE TO AROMATASE INHIBITOR: Primary | ICD-10-CM

## 2023-04-22 DIAGNOSIS — T38.6X5A OSTEOPOROSIS DUE TO AROMATASE INHIBITOR: Primary | ICD-10-CM

## 2023-04-23 DIAGNOSIS — E55.9 HYPOVITAMINOSIS D: Primary | ICD-10-CM

## 2023-05-16 DIAGNOSIS — J20.8 ACUTE BRONCHITIS DUE TO OTHER SPECIFIED ORGANISMS: Primary | ICD-10-CM

## 2023-05-16 RX ORDER — AMOXICILLIN 500 MG/1
500 CAPSULE ORAL 2 TIMES DAILY
Qty: 20 CAPSULE | Refills: 0 | Status: SHIPPED | OUTPATIENT
Start: 2023-05-16 | End: 2023-05-26

## 2023-05-26 DIAGNOSIS — Z17.0 MALIGNANT NEOPLASM OF UPPER-OUTER QUADRANT OF LEFT BREAST IN FEMALE, ESTROGEN RECEPTOR POSITIVE (HCC): Primary | ICD-10-CM

## 2023-05-26 DIAGNOSIS — C50.412 MALIGNANT NEOPLASM OF UPPER-OUTER QUADRANT OF LEFT BREAST IN FEMALE, ESTROGEN RECEPTOR POSITIVE (HCC): Primary | ICD-10-CM

## 2023-05-31 DIAGNOSIS — R21 RASH: Primary | ICD-10-CM

## 2023-06-16 ENCOUNTER — HOSPITAL ENCOUNTER (OUTPATIENT)
Facility: HOSPITAL | Age: 58
Discharge: HOME | End: 2023-06-19
Attending: INTERNAL MEDICINE
Payer: COMMERCIAL

## 2023-06-16 DIAGNOSIS — C50.412 MALIGNANT NEOPLASM OF UPPER-OUTER QUADRANT OF LEFT BREAST IN FEMALE, ESTROGEN RECEPTOR POSITIVE (HCC): ICD-10-CM

## 2023-06-16 DIAGNOSIS — Z17.0 MALIGNANT NEOPLASM OF UPPER-OUTER QUADRANT OF LEFT BREAST IN FEMALE, ESTROGEN RECEPTOR POSITIVE (HCC): ICD-10-CM

## 2023-06-16 PROCEDURE — G0279 TOMOSYNTHESIS, MAMMO: HCPCS

## 2023-07-01 DIAGNOSIS — M81.8 OSTEOPOROSIS DUE TO AROMATASE INHIBITOR: ICD-10-CM

## 2023-07-01 DIAGNOSIS — T38.6X5A OSTEOPOROSIS DUE TO AROMATASE INHIBITOR: ICD-10-CM

## 2023-07-01 DIAGNOSIS — E55.9 HYPOVITAMINOSIS D: ICD-10-CM

## 2023-07-02 SDOH — ECONOMIC STABILITY: FOOD INSECURITY: WITHIN THE PAST 12 MONTHS, YOU WORRIED THAT YOUR FOOD WOULD RUN OUT BEFORE YOU GOT MONEY TO BUY MORE.: NEVER TRUE

## 2023-07-02 SDOH — ECONOMIC STABILITY: TRANSPORTATION INSECURITY
IN THE PAST 12 MONTHS, HAS LACK OF TRANSPORTATION KEPT YOU FROM MEETINGS, WORK, OR FROM GETTING THINGS NEEDED FOR DAILY LIVING?: NO

## 2023-07-02 SDOH — ECONOMIC STABILITY: INCOME INSECURITY: HOW HARD IS IT FOR YOU TO PAY FOR THE VERY BASICS LIKE FOOD, HOUSING, MEDICAL CARE, AND HEATING?: NOT HARD AT ALL

## 2023-07-02 SDOH — ECONOMIC STABILITY: HOUSING INSECURITY
IN THE LAST 12 MONTHS, WAS THERE A TIME WHEN YOU DID NOT HAVE A STEADY PLACE TO SLEEP OR SLEPT IN A SHELTER (INCLUDING NOW)?: NO

## 2023-07-02 SDOH — ECONOMIC STABILITY: FOOD INSECURITY: WITHIN THE PAST 12 MONTHS, THE FOOD YOU BOUGHT JUST DIDN'T LAST AND YOU DIDN'T HAVE MONEY TO GET MORE.: NEVER TRUE

## 2023-07-12 ENCOUNTER — OFFICE VISIT (OUTPATIENT)
Age: 58
End: 2023-07-12

## 2023-07-12 VITALS
HEIGHT: 64 IN | WEIGHT: 137 LBS | OXYGEN SATURATION: 100 % | TEMPERATURE: 98 F | HEART RATE: 71 BPM | SYSTOLIC BLOOD PRESSURE: 100 MMHG | RESPIRATION RATE: 20 BRPM | BODY MASS INDEX: 23.39 KG/M2 | DIASTOLIC BLOOD PRESSURE: 63 MMHG

## 2023-07-12 DIAGNOSIS — Z13.29 SCREENING FOR THYROID DISORDER: ICD-10-CM

## 2023-07-12 DIAGNOSIS — E78.5 DYSLIPIDEMIA (HIGH LDL; LOW HDL): ICD-10-CM

## 2023-07-12 DIAGNOSIS — H83.03 LABYRINTHITIS OF BOTH EARS: ICD-10-CM

## 2023-07-12 DIAGNOSIS — Z13.1 SCREENING FOR DIABETES MELLITUS (DM): ICD-10-CM

## 2023-07-12 DIAGNOSIS — Z00.00 ENCOUNTER FOR ANNUAL PHYSICAL EXAM: Primary | ICD-10-CM

## 2023-07-12 DIAGNOSIS — C77.3 SECONDARY AND UNSPECIFIED MALIGNANT NEOPLASM OF AXILLA AND UPPER LIMB LYMPH NODES (HCC): ICD-10-CM

## 2023-07-12 RX ORDER — POLYMYXIN B SULFATE AND TRIMETHOPRIM 1; 10000 MG/ML; [USP'U]/ML
SOLUTION OPHTHALMIC
Qty: 10 ML | Refills: 0 | Status: SHIPPED | OUTPATIENT
Start: 2023-07-12

## 2023-07-12 SDOH — ECONOMIC STABILITY: FOOD INSECURITY: WITHIN THE PAST 12 MONTHS, THE FOOD YOU BOUGHT JUST DIDN'T LAST AND YOU DIDN'T HAVE MONEY TO GET MORE.: NEVER TRUE

## 2023-07-12 SDOH — ECONOMIC STABILITY: FOOD INSECURITY: WITHIN THE PAST 12 MONTHS, YOU WORRIED THAT YOUR FOOD WOULD RUN OUT BEFORE YOU GOT MONEY TO BUY MORE.: NEVER TRUE

## 2023-07-12 ASSESSMENT — ANXIETY QUESTIONNAIRES
1. FEELING NERVOUS, ANXIOUS, OR ON EDGE: 0
GAD7 TOTAL SCORE: 0
3. WORRYING TOO MUCH ABOUT DIFFERENT THINGS: 0
4. TROUBLE RELAXING: 0
5. BEING SO RESTLESS THAT IT IS HARD TO SIT STILL: 0
IF YOU CHECKED OFF ANY PROBLEMS ON THIS QUESTIONNAIRE, HOW DIFFICULT HAVE THESE PROBLEMS MADE IT FOR YOU TO DO YOUR WORK, TAKE CARE OF THINGS AT HOME, OR GET ALONG WITH OTHER PEOPLE: NOT DIFFICULT AT ALL
7. FEELING AFRAID AS IF SOMETHING AWFUL MIGHT HAPPEN: 0
6. BECOMING EASILY ANNOYED OR IRRITABLE: 0
2. NOT BEING ABLE TO STOP OR CONTROL WORRYING: 0

## 2023-07-12 ASSESSMENT — PATIENT HEALTH QUESTIONNAIRE - PHQ9
SUM OF ALL RESPONSES TO PHQ QUESTIONS 1-9: 0
SUM OF ALL RESPONSES TO PHQ9 QUESTIONS 1 & 2: 0
SUM OF ALL RESPONSES TO PHQ QUESTIONS 1-9: 0
1. LITTLE INTEREST OR PLEASURE IN DOING THINGS: 0
2. FEELING DOWN, DEPRESSED OR HOPELESS: 0

## 2023-07-12 NOTE — PROGRESS NOTES
Chief Complaint   Patient presents with    Annual Exam     Requesting labs        1. Have you been to the ER, urgent care clinic since your last visit? Hospitalized since your last visit? No    2. Have you seen or consulted any other health care providers outside of the [address] [place] [address] Avenue since your last visit? Include any pap smears or colon screening.  No

## 2023-07-12 NOTE — PROGRESS NOTES
Chief Complaint   Patient presents with    Annual Exam     Requesting labs        HPI:  Ralf Ellis is a 60 y.o. female with h/o breast cancer presents for Annual physical examination. Patient is doing well.  blood pressure is at goal. She still follows with her oncologist.    Review of Systems  As per hpi    Past Medical History:   Diagnosis Date    Breast cancer ([address])     lumpectomy 7/21    Breast cancer in female [facility])     Cancer ([address])     Eczema     Menopause     S/P lumpectomy of breast     S/P radiation therapy     Ventral hernia 8/28/2014     Past Surgical History:   Procedure Laterality Date    BREAST LUMPECTOMY Left 7/13/2021    LEFT BREAST LUMPECTOMY WITH ULTRASOUND/LEFT BREAST SENTINEL NODE BIOPSY (1200) performed by Bee Eason MD at [address]  2012    GI      colonscopy    GYN      c section    GYN      fibroid sx    MYOMECTOMY  2005 & 2011    US BREAST BIOPSY W LOC DEVICE 1ST LESION LEFT Left 6/21/2021    US BREAST NEEDLE BIOPSY LEFT 6/21/2021 [facility] RAD MAMMO     Social History     Socioeconomic History    Marital status:      Spouse name: None    Number of children: None    Years of education: None    Highest education level: None   Tobacco Use    Smoking status: Never    Smokeless tobacco: Never   Substance and Sexual Activity    Alcohol use: No    Drug use: No     Social Determinants of Health     Food Insecurity: No Food Insecurity    Worried About Running Out of Food in the Last Year: Never true    Ran Out of Food in the Last Year: Never true   Transportation Needs: Unknown    Lack of Transportation (Non-Medical): No   Housing Stability: Unknown    Unstable Housing in the Last Year: No     Family History   Problem Relation Age of Onset    Breast Cancer Sister 61    No Known Problems Maternal Grandfather     Hypertension Mother     Diabetes Father     No Known Problems Maternal Grandmother     No Known Problems Brother     No Known Problems Paternal

## 2023-07-17 ENCOUNTER — TELEPHONE (OUTPATIENT)
Age: 58
End: 2023-07-17

## 2023-07-19 ENCOUNTER — OFFICE VISIT (OUTPATIENT)
Age: 58
End: 2023-07-19
Payer: COMMERCIAL

## 2023-07-19 VITALS — BODY MASS INDEX: 23.39 KG/M2 | WEIGHT: 137 LBS | HEIGHT: 64 IN

## 2023-07-19 DIAGNOSIS — Z17.0 MALIGNANT NEOPLASM OF UPPER-OUTER QUADRANT OF LEFT BREAST IN FEMALE, ESTROGEN RECEPTOR POSITIVE (HCC): ICD-10-CM

## 2023-07-19 DIAGNOSIS — N63.11 MASS OF UPPER OUTER QUADRANT OF RIGHT BREAST: Primary | ICD-10-CM

## 2023-07-19 DIAGNOSIS — C50.412 MALIGNANT NEOPLASM OF UPPER-OUTER QUADRANT OF LEFT BREAST IN FEMALE, ESTROGEN RECEPTOR POSITIVE (HCC): ICD-10-CM

## 2023-07-19 DIAGNOSIS — Z98.890 HX OF LUMPECTOMY: ICD-10-CM

## 2023-07-19 PROCEDURE — 76642 ULTRASOUND BREAST LIMITED: CPT | Performed by: SURGERY

## 2023-07-19 PROCEDURE — 99214 OFFICE O/P EST MOD 30 MIN: CPT | Performed by: SURGERY

## 2023-07-19 NOTE — PROGRESS NOTES
HISTORY OF PRESENT ILLNESS  Rehana Herr is a 60 y.o. female     Breast Problem ESTABLISHED patient here for RIGHT breast mass. Noticed 1 month - 6 weeks ago. Denies any other breast changes. Breast Cancer:    6/21/21 - LEFT breast bx:  LEFT breast IDC, gr2,  ER 99%, IA 80%, HER2 neg, ki-67 30%    7/13/21 - LEFT breast lumpectomy, SNbx   T2 N1     Score 23 / Oncotype    Dr Hannah Collins - AI, letrozole    Macel [place]- XRT    [id number]        Review of Systems   All other systems reviewed and are negative. Physical Exam  Vitals and nursing note reviewed. Chest:   Breasts:     Right: No swelling, bleeding, inverted nipple, mass, nipple discharge, skin change or tenderness. Left: No swelling, bleeding, inverted nipple, mass, nipple discharge, skin change or tenderness. Comments: Dense breast tissue bilateral uoq  Lymphadenopathy:      Upper Body:      Right upper body: No axillary adenopathy. Left upper body: No axillary adenopathy. BREAST ULTRASOUND  Indication: Right  breast mass uoq  Technique: The area was scanned using a high-frequency linear-array near-field transducer  Findings: No abnormal mass, lesion, or shadowing noted. No cysts  Impression: Normal dense fibrocystic breast tissue   Disposition: No worrisome finding on ultrasound     ASSESSMENT and PLAN   Diagnosis Orders   1. Mass of upper outer quadrant of right breast        2. Malignant neoplasm of upper-outer quadrant of left breast in female, estrogen receptor positive ([address])        3. Hx of lumpectomy        - no mass on ultrasound or mammo  - will plan for breast mri in October. 30 minutes was spent with patient on counseling and coordination of care.

## 2023-07-26 ENCOUNTER — TELEPHONE (OUTPATIENT)
Age: 58
End: 2023-07-26

## 2023-07-26 DIAGNOSIS — M81.8 OTHER OSTEOPOROSIS WITHOUT CURRENT PATHOLOGICAL FRACTURE: Primary | ICD-10-CM

## 2023-07-26 NOTE — TELEPHONE ENCOUNTER
Patient called apologizing that she missed her appointment this morning. Advised OK, as her Dexa is due after 08/18/23. Reordered Dexa and mailed to patient. Labs are in computer from July. Sent a note attached to the Dexa order asking her to get the Dexa and labs a week prior to her upcoming appointment.

## 2023-07-29 DIAGNOSIS — J20.8 ACUTE BRONCHITIS DUE TO OTHER SPECIFIED ORGANISMS: ICD-10-CM

## 2023-07-29 RX ORDER — AMOXICILLIN 500 MG/1
500 CAPSULE ORAL 2 TIMES DAILY
Qty: 20 CAPSULE | Refills: 0 | Status: SHIPPED | OUTPATIENT
Start: 2023-07-29 | End: 2023-08-08

## 2023-07-31 DIAGNOSIS — Z13.1 SCREENING FOR DIABETES MELLITUS (DM): ICD-10-CM

## 2023-07-31 DIAGNOSIS — E78.5 DYSLIPIDEMIA (HIGH LDL; LOW HDL): ICD-10-CM

## 2023-07-31 DIAGNOSIS — Z13.29 SCREENING FOR THYROID DISORDER: ICD-10-CM

## 2023-07-31 LAB
CHOLEST SERPL-MCNC: 159 MG/DL
HDLC SERPL-MCNC: 70 MG/DL
HDLC SERPL: 2.3 (ref 0–5)
LDLC SERPL CALC-MCNC: 78.2 MG/DL (ref 0–100)
TRIGL SERPL-MCNC: 54 MG/DL
TSH SERPL DL<=0.05 MIU/L-ACNC: 0.81 UIU/ML (ref 0.36–3.74)
TSH SERPL DL<=0.05 MIU/L-ACNC: NORMAL M[IU]/L
VLDLC SERPL CALC-MCNC: 10.8 MG/DL

## 2023-08-01 LAB
EST. AVERAGE GLUCOSE BLD GHB EST-MCNC: 100 MG/DL
HBA1C MFR BLD: 5.1 % (ref 4–5.6)

## 2023-08-16 ENCOUNTER — OFFICE VISIT (OUTPATIENT)
Age: 58
End: 2023-08-16
Payer: COMMERCIAL

## 2023-08-16 VITALS
OXYGEN SATURATION: 100 % | TEMPERATURE: 97.8 F | BODY MASS INDEX: 23.56 KG/M2 | HEIGHT: 64 IN | SYSTOLIC BLOOD PRESSURE: 110 MMHG | WEIGHT: 138 LBS | DIASTOLIC BLOOD PRESSURE: 64 MMHG | RESPIRATION RATE: 20 BRPM | HEART RATE: 65 BPM

## 2023-08-16 DIAGNOSIS — S89.91XA RIGHT KNEE INJURY, INITIAL ENCOUNTER: Primary | ICD-10-CM

## 2023-08-16 PROCEDURE — 99213 OFFICE O/P EST LOW 20 MIN: CPT | Performed by: INTERNAL MEDICINE

## 2023-08-16 ASSESSMENT — ANXIETY QUESTIONNAIRES
7. FEELING AFRAID AS IF SOMETHING AWFUL MIGHT HAPPEN: 0
4. TROUBLE RELAXING: 0
1. FEELING NERVOUS, ANXIOUS, OR ON EDGE: 0
3. WORRYING TOO MUCH ABOUT DIFFERENT THINGS: 0
2. NOT BEING ABLE TO STOP OR CONTROL WORRYING: 0
GAD7 TOTAL SCORE: 0
6. BECOMING EASILY ANNOYED OR IRRITABLE: 0
IF YOU CHECKED OFF ANY PROBLEMS ON THIS QUESTIONNAIRE, HOW DIFFICULT HAVE THESE PROBLEMS MADE IT FOR YOU TO DO YOUR WORK, TAKE CARE OF THINGS AT HOME, OR GET ALONG WITH OTHER PEOPLE: NOT DIFFICULT AT ALL
5. BEING SO RESTLESS THAT IT IS HARD TO SIT STILL: 0

## 2023-08-16 ASSESSMENT — PATIENT HEALTH QUESTIONNAIRE - PHQ9
SUM OF ALL RESPONSES TO PHQ QUESTIONS 1-9: 0
2. FEELING DOWN, DEPRESSED OR HOPELESS: 0
SUM OF ALL RESPONSES TO PHQ9 QUESTIONS 1 & 2: 0
SUM OF ALL RESPONSES TO PHQ QUESTIONS 1-9: 0
1. LITTLE INTEREST OR PLEASURE IN DOING THINGS: 0

## 2023-08-16 NOTE — PROGRESS NOTES
Chief Complaint   Patient presents with    Knee Pain     Right knee pain  withsports   Was seen ortho      HPI:   Kimberly Smith is a 60 y.o. female presents with Knee Pain on right. Patient injured right knee pain while playing sports. She was seen at orthopedic and advised to see her pcp if pain persists.     Review of Systems  As per hpi    Past Medical History:   Diagnosis Date    Breast cancer ([address])     lumpectomy 7/21    Breast cancer in female [facility])     Cancer ([address])     Eczema     Menopause     S/P lumpectomy of breast     S/P radiation therapy     Ventral hernia 8/28/2014     Past Surgical History:   Procedure Laterality Date    BREAST LUMPECTOMY Left 7/13/2021    LEFT BREAST LUMPECTOMY WITH ULTRASOUND/LEFT BREAST SENTINEL NODE BIOPSY (1200) performed by Clyde Bedoya MD at [address]  2012    GI      colonscopy    GYN      c section    GYN      fibroid sx    MYOMECTOMY  2005 & 2011    US BREAST BIOPSY W LOC DEVICE 1ST LESION LEFT Left 6/21/2021    US BREAST NEEDLE BIOPSY LEFT 6/21/2021 [facility] RAD MAMMO     Social History     Socioeconomic History    Marital status:      Spouse name: None    Number of children: None    Years of education: None    Highest education level: None   Tobacco Use    Smoking status: Never    Smokeless tobacco: Never   Substance and Sexual Activity    Alcohol use: No    Drug use: No     Social Determinants of Health     Food Insecurity: No Food Insecurity    Worried About Running Out of Food in the Last Year: Never true    Ran Out of Food in the Last Year: Never true   Transportation Needs: Unknown    Lack of Transportation (Non-Medical): No   Housing Stability: Unknown    Unstable Housing in the Last Year: No     Family History   Problem Relation Age of Onset    Breast Cancer Sister 61    No Known Problems Maternal Grandfather     Hypertension Mother     Diabetes Father     No Known Problems Maternal Grandmother     No Known Problems Brother

## 2023-08-30 ENCOUNTER — HOSPITAL ENCOUNTER (OUTPATIENT)
Facility: HOSPITAL | Age: 58
Discharge: HOME | End: 2023-09-02
Attending: INTERNAL MEDICINE
Payer: COMMERCIAL

## 2023-08-30 VITALS — BODY MASS INDEX: 23.56 KG/M2 | HEIGHT: 64 IN | WEIGHT: 138 LBS

## 2023-08-30 DIAGNOSIS — M81.8 OTHER OSTEOPOROSIS WITHOUT CURRENT PATHOLOGICAL FRACTURE: ICD-10-CM

## 2023-08-30 PROCEDURE — 77080 DXA BONE DENSITY AXIAL: CPT

## 2023-09-05 DIAGNOSIS — E55.9 VITAMIN D DEFICIENCY, UNSPECIFIED: ICD-10-CM

## 2023-09-05 DIAGNOSIS — T38.6X5A ADVERSE EFFECT OF ANTIGONADOTROPHINS, ANTIESTROGENS, ANTIANDROGENS, NOT ELSEWHERE CLASSIFIED, INITIAL ENCOUNTER: ICD-10-CM

## 2023-09-05 DIAGNOSIS — M81.8 OTHER OSTEOPOROSIS WITHOUT CURRENT PATHOLOGICAL FRACTURE: Primary | ICD-10-CM

## 2023-09-12 ENCOUNTER — OFFICE VISIT (OUTPATIENT)
Age: 58
End: 2023-09-12
Payer: COMMERCIAL

## 2023-09-12 VITALS
DIASTOLIC BLOOD PRESSURE: 69 MMHG | BODY MASS INDEX: 24.04 KG/M2 | HEIGHT: 64 IN | HEART RATE: 70 BPM | SYSTOLIC BLOOD PRESSURE: 118 MMHG | WEIGHT: 140.8 LBS

## 2023-09-12 DIAGNOSIS — E55.9 VITAMIN D DEFICIENCY, UNSPECIFIED: ICD-10-CM

## 2023-09-12 DIAGNOSIS — M81.8 OTHER OSTEOPOROSIS WITHOUT CURRENT PATHOLOGICAL FRACTURE: Primary | ICD-10-CM

## 2023-09-12 DIAGNOSIS — T38.6X5A ADVERSE EFFECT OF ANTIGONADOTROPHINS, ANTIESTROGENS, ANTIANDROGENS, NOT ELSEWHERE CLASSIFIED, INITIAL ENCOUNTER: ICD-10-CM

## 2023-09-12 PROCEDURE — 99214 OFFICE O/P EST MOD 30 MIN: CPT | Performed by: INTERNAL MEDICINE

## 2023-09-12 NOTE — PROGRESS NOTES
Chief Complaint   Patient presents with    Osteoporosis     Pcp and pharmacy verified     History of Present Illness: Dianna Terry is a 60 y.o. female here for follow up of osteoporosis secondary to Letrozole. Pt has hx of Breast cancer in the left breast, she is s/p resection and XRT and was started on Letrazole on 10/12/2021. Pt has a DXA scan on 8/18/21, prior to being started on the Letrazole. Her L-Spine T-Score was -1.9 with LFN -0.9, LTH -0.3, RFN -0.7, RTH +0.1. Her Frax risk score was 5.5% for major OP fracture and 0.1% for Hip fracture. She developed back pain and was sent for a spinal MRI in June 2022 and that showed a T11-T12 compression fracture. We discussed treatment options for her osteoporosis and she was more comfortable with taking Zoledronic Acid and pt received infusion #1 on 8/22/22. Pt tolerated the Zoledronic Acid infusion with no problems or issues. Pt is still taking the Letrazole 2.5mg daily. Pt is taking Vitamin D 5000 units daily and Ca 1200mg daily. She is very active running 3-4 days per week and is still doing weight training as well. Her BMI today was 23 with a weight of 64kg. Her DXA scan on 9/5/23 showed improvement in her LFN and RFN, her LTH was unchanged, her RTH declined some and her L-Spine was unchanged. She does not have any problems or issues when she runs, and has not been needing the back brace. She denies any falls or fractures since our last visit. She denies any issues of dysuria, hematuria or renal stone. She denies issues of oral ulcers or sores. She denies issues of eye pain or irritation. She denies any new hip/thigh pains. She is post-menopausal at the age of 47. She did not receive HRT. She did go through IVF and had a late pregnancy. Current Outpatient Medications   Medication Sig    Calcium Carbonate-Vitamin D 600-3. 125 MG-MCG TABS Take by mouth    Cholecalciferol 50 MCG (2000 UT) TABS Take 2.5 tablets by mouth

## 2023-09-21 LAB
CHOLEST SERPL-MCNC: 174 MG/DL
GLUCOSE SERPL-MCNC: 94 MG/DL (ref 65–100)
HDLC SERPL-MCNC: 74 MG/DL
LDLC SERPL CALC-MCNC: 89.2 MG/DL (ref 0–100)
TRIGL SERPL-MCNC: 54 MG/DL

## 2023-09-22 DIAGNOSIS — M81.8 OSTEOPOROSIS DUE TO AROMATASE INHIBITOR: Primary | ICD-10-CM

## 2023-09-22 DIAGNOSIS — T38.6X5A OSTEOPOROSIS DUE TO AROMATASE INHIBITOR: Primary | ICD-10-CM

## 2023-09-22 RX ORDER — SODIUM CHLORIDE 9 MG/ML
INJECTION, SOLUTION INTRAVENOUS CONTINUOUS
OUTPATIENT
Start: 2023-09-29

## 2023-09-22 RX ORDER — ALBUTEROL SULFATE 90 UG/1
4 AEROSOL, METERED RESPIRATORY (INHALATION) PRN
OUTPATIENT
Start: 2023-09-29

## 2023-09-22 RX ORDER — ACETAMINOPHEN 325 MG/1
650 TABLET ORAL
OUTPATIENT
Start: 2023-09-29

## 2023-09-22 RX ORDER — FAMOTIDINE 10 MG/ML
20 INJECTION, SOLUTION INTRAVENOUS
OUTPATIENT
Start: 2023-09-29

## 2023-09-22 RX ORDER — ZOLEDRONIC ACID 5 MG/100ML
5 INJECTION, SOLUTION INTRAVENOUS ONCE
OUTPATIENT
Start: 2023-09-29 | End: 2023-09-29

## 2023-09-22 RX ORDER — DIPHENHYDRAMINE HYDROCHLORIDE 50 MG/ML
50 INJECTION INTRAMUSCULAR; INTRAVENOUS
OUTPATIENT
Start: 2023-09-29

## 2023-09-22 RX ORDER — ONDANSETRON 2 MG/ML
8 INJECTION INTRAMUSCULAR; INTRAVENOUS
OUTPATIENT
Start: 2023-09-29

## 2023-09-22 RX ORDER — SODIUM CHLORIDE 9 MG/ML
5-250 INJECTION, SOLUTION INTRAVENOUS PRN
OUTPATIENT
Start: 2023-09-29

## 2023-09-22 RX ORDER — EPINEPHRINE 1 MG/ML
0.3 INJECTION, SOLUTION, CONCENTRATE INTRAVENOUS PRN
OUTPATIENT
Start: 2023-09-29

## 2023-09-29 ENCOUNTER — HOSPITAL ENCOUNTER (OUTPATIENT)
Facility: HOSPITAL | Age: 58
Setting detail: INFUSION SERIES
End: 2023-09-29
Payer: COMMERCIAL

## 2023-09-29 VITALS
WEIGHT: 141 LBS | OXYGEN SATURATION: 100 % | HEIGHT: 64 IN | SYSTOLIC BLOOD PRESSURE: 138 MMHG | BODY MASS INDEX: 24.07 KG/M2 | HEART RATE: 70 BPM | TEMPERATURE: 97.8 F | DIASTOLIC BLOOD PRESSURE: 81 MMHG | RESPIRATION RATE: 16 BRPM

## 2023-09-29 DIAGNOSIS — T38.6X5A OSTEOPOROSIS DUE TO AROMATASE INHIBITOR: Primary | ICD-10-CM

## 2023-09-29 DIAGNOSIS — M81.8 OSTEOPOROSIS DUE TO AROMATASE INHIBITOR: Primary | ICD-10-CM

## 2023-09-29 LAB
ANION GAP BLD CALC-SCNC: 9.5 MMOL/L (ref 10–20)
CA-I BLD-MCNC: 1.21 MMOL/L (ref 1.12–1.32)
CHLORIDE BLD-SCNC: 99 MMOL/L (ref 98–107)
CO2 BLD-SCNC: 30.5 MMOL/L (ref 21–32)
CREAT BLD-MCNC: 0.76 MG/DL (ref 0.6–1.3)
GLUCOSE BLD-MCNC: 107 MG/DL (ref 65–100)
POTASSIUM BLD-SCNC: 3.6 MMOL/L (ref 3.5–5.1)
SERVICE CMNT-IMP: ABNORMAL
SODIUM BLD-SCNC: 139 MMOL/L (ref 136–145)

## 2023-09-29 PROCEDURE — 2580000003 HC RX 258: Performed by: INTERNAL MEDICINE

## 2023-09-29 PROCEDURE — 96374 THER/PROPH/DIAG INJ IV PUSH: CPT

## 2023-09-29 PROCEDURE — 6360000002 HC RX W HCPCS: Performed by: INTERNAL MEDICINE

## 2023-09-29 PROCEDURE — 80047 BASIC METABLC PNL IONIZED CA: CPT

## 2023-09-29 RX ORDER — EPINEPHRINE 1 MG/ML
0.3 INJECTION, SOLUTION INTRAMUSCULAR; SUBCUTANEOUS PRN
OUTPATIENT
Start: 2024-09-22

## 2023-09-29 RX ORDER — ONDANSETRON 2 MG/ML
8 INJECTION INTRAMUSCULAR; INTRAVENOUS
OUTPATIENT
Start: 2024-09-22

## 2023-09-29 RX ORDER — DIPHENHYDRAMINE HYDROCHLORIDE 50 MG/ML
50 INJECTION INTRAMUSCULAR; INTRAVENOUS
OUTPATIENT
Start: 2024-09-22

## 2023-09-29 RX ORDER — ALBUTEROL SULFATE 90 UG/1
4 AEROSOL, METERED RESPIRATORY (INHALATION) PRN
OUTPATIENT
Start: 2024-09-22

## 2023-09-29 RX ORDER — ZOLEDRONIC ACID 5 MG/100ML
5 INJECTION, SOLUTION INTRAVENOUS ONCE
Status: COMPLETED | OUTPATIENT
Start: 2023-09-29 | End: 2023-09-29

## 2023-09-29 RX ORDER — SODIUM CHLORIDE 9 MG/ML
INJECTION, SOLUTION INTRAVENOUS CONTINUOUS
OUTPATIENT
Start: 2024-09-22

## 2023-09-29 RX ORDER — SODIUM CHLORIDE 9 MG/ML
5-250 INJECTION, SOLUTION INTRAVENOUS PRN
Status: DISCONTINUED | OUTPATIENT
Start: 2023-09-29 | End: 2023-09-30 | Stop reason: HOSPADM

## 2023-09-29 RX ORDER — SODIUM CHLORIDE 9 MG/ML
5-250 INJECTION, SOLUTION INTRAVENOUS PRN
OUTPATIENT
Start: 2024-09-22

## 2023-09-29 RX ORDER — ZOLEDRONIC ACID 5 MG/100ML
5 INJECTION, SOLUTION INTRAVENOUS ONCE
OUTPATIENT
Start: 2024-09-22 | End: 2024-09-22

## 2023-09-29 RX ORDER — ACETAMINOPHEN 325 MG/1
650 TABLET ORAL
OUTPATIENT
Start: 2024-09-22

## 2023-09-29 RX ADMIN — SODIUM CHLORIDE 25 ML/HR: 9 INJECTION, SOLUTION INTRAVENOUS at 15:54

## 2023-09-29 RX ADMIN — ZOLEDRONIC ACID 5 MG: 5 INJECTION, SOLUTION INTRAVENOUS at 15:55

## 2023-10-06 ENCOUNTER — TELEPHONE (OUTPATIENT)
Facility: HOSPITAL | Age: 58
End: 2023-10-06

## 2023-10-06 DIAGNOSIS — Z85.3 HISTORY OF MALIGNANT NEOPLASM OF LEFT BREAST: ICD-10-CM

## 2023-10-06 DIAGNOSIS — Z91.89 AT HIGH RISK FOR BREAST CANCER: Primary | ICD-10-CM

## 2023-10-06 NOTE — TELEPHONE ENCOUNTER
[facility]  Breast Navigator Encounter    Name:    Miriam Mock  Age:    60 y.o. Diagnosis:    LEFT breast cancer    Patient LM that she was due for her yearly MRI. Discussed with Dr. Luzma Ritchie, and I placed an order for a routine breast MRI with and without contrast per verbal order of Dr. Lucero Shelton.                               Aminah Alexander RN, BSN, [facility]  Oncology Breast Navigator     [address], [address]  W: [phone]  F: [phone]  [email or web address]  Good Help to Those in [facility]

## 2023-11-08 ENCOUNTER — HOSPITAL ENCOUNTER (OUTPATIENT)
Facility: HOSPITAL | Age: 58
Discharge: HOME | End: 2023-11-11
Attending: SURGERY
Payer: COMMERCIAL

## 2023-11-08 DIAGNOSIS — Z85.3 HISTORY OF MALIGNANT NEOPLASM OF LEFT BREAST: ICD-10-CM

## 2023-11-08 DIAGNOSIS — Z91.89 AT HIGH RISK FOR BREAST CANCER: ICD-10-CM

## 2023-11-08 PROCEDURE — 6360000004 HC RX CONTRAST MEDICATION: Performed by: SURGERY

## 2023-11-08 PROCEDURE — C8908 MRI W/O FOL W/CONT, BREAST,: HCPCS

## 2023-11-08 PROCEDURE — A9579 GAD-BASE MR CONTRAST NOS,1ML: HCPCS | Performed by: SURGERY

## 2023-11-08 PROCEDURE — 2580000003 HC RX 258: Performed by: SURGERY

## 2023-11-08 RX ORDER — 0.9 % SODIUM CHLORIDE 0.9 %
100 INTRAVENOUS SOLUTION INTRAVENOUS ONCE
Status: COMPLETED | OUTPATIENT
Start: 2023-11-08 | End: 2023-11-08

## 2023-11-08 RX ADMIN — SODIUM CHLORIDE 100 ML: 9 INJECTION, SOLUTION INTRAVENOUS at 09:39

## 2023-11-08 RX ADMIN — GADOTERIDOL 14 ML: 279.3 INJECTION, SOLUTION INTRAVENOUS at 09:39

## 2023-12-25 DIAGNOSIS — R22.41 MASS OF RIGHT LOWER LEG: Primary | ICD-10-CM

## 2023-12-25 DIAGNOSIS — R22.41 MASS OF LEG, RIGHT: Primary | ICD-10-CM

## 2024-01-04 DIAGNOSIS — R22.41 LEG MASS, RIGHT: Primary | ICD-10-CM

## 2024-01-09 ENCOUNTER — APPOINTMENT (OUTPATIENT)
Facility: HOSPITAL | Age: 59
End: 2024-01-09
Attending: INTERNAL MEDICINE
Payer: COMMERCIAL

## 2024-01-09 ENCOUNTER — HOSPITAL ENCOUNTER (OUTPATIENT)
Facility: HOSPITAL | Age: 59
Discharge: HOME | End: 2024-01-12
Attending: INTERNAL MEDICINE
Payer: COMMERCIAL

## 2024-01-09 DIAGNOSIS — R22.41 MASS OF LEG, RIGHT: ICD-10-CM

## 2024-01-09 PROCEDURE — 76882 US LMTD JT/FCL EVL NVASC XTR: CPT

## 2024-01-17 ENCOUNTER — OFFICE VISIT (OUTPATIENT)
Age: 59
End: 2024-01-17
Payer: COMMERCIAL

## 2024-01-17 DIAGNOSIS — C50.412 MALIGNANT NEOPLASM OF UPPER-OUTER QUADRANT OF LEFT BREAST IN FEMALE, ESTROGEN RECEPTOR POSITIVE (HCC): Primary | ICD-10-CM

## 2024-01-17 DIAGNOSIS — Z17.0 MALIGNANT NEOPLASM OF UPPER-OUTER QUADRANT OF LEFT BREAST IN FEMALE, ESTROGEN RECEPTOR POSITIVE (HCC): Primary | ICD-10-CM

## 2024-01-17 PROCEDURE — 99213 OFFICE O/P EST LOW 20 MIN: CPT | Performed by: SURGERY

## 2024-01-17 NOTE — PROGRESS NOTES
HISTORY OF PRESENT ILLNESS  Michelle Urena is a 59 y.o. female     HPI ESTABLISHED patient here for 6 month follow up visit.    Breast Cancer:     6/21/21 - LEFT breast bx:  LEFT breast IDC, gr2,  ER 99%, [place] 80%, HER2 neg, ki-67 30%     7/13/21 - LEFT breast lumpectomy, SNbx   T2 N1      Score 23 / Oncotype     Dr Dee - AI, letrozole     Kirstin- XRT     BRACA2-VUS    Review of Systems   All other systems reviewed and are negative.        Physical Exam  Vitals and nursing note reviewed.   Chest:   Breasts:     Right: No swelling, bleeding, inverted nipple, mass, nipple discharge, skin change or tenderness.      Left: No swelling, bleeding, inverted nipple, mass, nipple discharge, skin change or tenderness.   Lymphadenopathy:      Upper Body:      Right upper body: No axillary adenopathy.      Left upper body: No axillary adenopathy.            ASSESSMENT and PLAN   Diagnosis Orders   1. Malignant neoplasm of upper-outer quadrant of left breast in female, estrogen receptor positive (HCC)          No evidence local recurrence  Mri normal as of 11/2023  Mammogram due jun 2024

## 2024-02-08 DIAGNOSIS — J20.9 ACUTE BRONCHITIS DUE TO INFECTION: Primary | ICD-10-CM

## 2024-02-08 RX ORDER — AMOXICILLIN 500 MG/1
500 CAPSULE ORAL 2 TIMES DAILY
Qty: 20 CAPSULE | Refills: 0 | Status: SHIPPED | OUTPATIENT
Start: 2024-02-08 | End: 2024-02-18

## 2024-05-01 DIAGNOSIS — M81.8 OTHER OSTEOPOROSIS WITHOUT CURRENT PATHOLOGICAL FRACTURE: Primary | ICD-10-CM

## 2024-05-01 DIAGNOSIS — E55.9 VITAMIN D DEFICIENCY, UNSPECIFIED: ICD-10-CM

## 2024-05-03 DIAGNOSIS — Z85.3 ENCOUNTER FOR FOLLOW-UP SURVEILLANCE OF BREAST CANCER: Primary | ICD-10-CM

## 2024-05-03 DIAGNOSIS — Z08 ENCOUNTER FOR FOLLOW-UP SURVEILLANCE OF BREAST CANCER: Primary | ICD-10-CM

## 2024-06-12 ENCOUNTER — HOSPITAL ENCOUNTER (OUTPATIENT)
Facility: HOSPITAL | Age: 59
Discharge: HOME | End: 2024-06-15
Attending: INTERNAL MEDICINE
Payer: COMMERCIAL

## 2024-06-12 VITALS — HEIGHT: 64 IN | BODY MASS INDEX: 23.9 KG/M2 | WEIGHT: 140 LBS

## 2024-06-12 DIAGNOSIS — Z85.3 ENCOUNTER FOR FOLLOW-UP SURVEILLANCE OF BREAST CANCER: ICD-10-CM

## 2024-06-12 DIAGNOSIS — Z08 ENCOUNTER FOR FOLLOW-UP SURVEILLANCE OF BREAST CANCER: ICD-10-CM

## 2024-06-12 PROCEDURE — G0279 TOMOSYNTHESIS, MAMMO: HCPCS

## 2024-06-19 ENCOUNTER — OFFICE VISIT (OUTPATIENT)
Age: 59
End: 2024-06-19
Payer: COMMERCIAL

## 2024-06-19 DIAGNOSIS — C50.412 MALIGNANT NEOPLASM OF UPPER-OUTER QUADRANT OF LEFT BREAST IN FEMALE, ESTROGEN RECEPTOR POSITIVE (HCC): Primary | ICD-10-CM

## 2024-06-19 DIAGNOSIS — Z17.0 MALIGNANT NEOPLASM OF UPPER-OUTER QUADRANT OF LEFT BREAST IN FEMALE, ESTROGEN RECEPTOR POSITIVE (HCC): Primary | ICD-10-CM

## 2024-06-19 PROCEDURE — 99213 OFFICE O/P EST LOW 20 MIN: CPT | Performed by: SURGERY

## 2024-06-19 NOTE — PROGRESS NOTES
HISTORY OF PRESENT ILLNESS  Michelle Urena is a 59 y.o. female     HPI ESTABLISHED patient here for follow up appointment.  Denies any breast issues or concerns.      Breast Cancer:     6/21/21 - LEFT breast bx:  LEFT breast IDC, gr2,  ER 99%, [place] 80%, HER2 neg, ki-67 30%     7/13/21 - LEFT breast lumpectomy, SNbx   T2 N1      Score 23 / Oncotype     Dr Stokes - AI, letrozole     Kirstin- XRT     BRACA2-VUS    Review of Systems   All other systems reviewed and are negative.        Physical Exam  Vitals and nursing note reviewed.   Chest:   Breasts:     Right: No swelling, bleeding, inverted nipple, mass, nipple discharge, skin change or tenderness.      Left: No swelling, bleeding, inverted nipple, mass, nipple discharge, skin change or tenderness.   Lymphadenopathy:      Upper Body:      Right upper body: No axillary adenopathy.      Left upper body: No axillary adenopathy.            ASSESSMENT and PLAN   Diagnosis Orders   1. Malignant neoplasm of upper-outer quadrant of left breast in female, estrogen receptor positive (HCC)        - mammogram normal  Mri due in October  [facility] in 6 months  20 minutes was spent with patient on counseling and coordination of care.

## 2024-08-13 DIAGNOSIS — K29.00 ACUTE GASTRITIS WITHOUT HEMORRHAGE, UNSPECIFIED GASTRITIS TYPE: Primary | ICD-10-CM

## 2024-08-13 RX ORDER — PANTOPRAZOLE SODIUM 20 MG/1
20 TABLET, DELAYED RELEASE ORAL
Qty: 90 TABLET | Refills: 0 | Status: SHIPPED | OUTPATIENT
Start: 2024-08-13

## 2024-08-16 ENCOUNTER — OFFICE VISIT (OUTPATIENT)
Age: 59
End: 2024-08-16
Payer: COMMERCIAL

## 2024-08-16 VITALS
SYSTOLIC BLOOD PRESSURE: 113 MMHG | HEART RATE: 62 BPM | BODY MASS INDEX: 24.21 KG/M2 | RESPIRATION RATE: 20 BRPM | WEIGHT: 141.8 LBS | TEMPERATURE: 97.4 F | HEIGHT: 64 IN | OXYGEN SATURATION: 99 % | DIASTOLIC BLOOD PRESSURE: 70 MMHG

## 2024-08-16 DIAGNOSIS — E78.5 DYSLIPIDEMIA (HIGH LDL; LOW HDL): Primary | ICD-10-CM

## 2024-08-16 DIAGNOSIS — K21.00 GASTROESOPHAGEAL REFLUX DISEASE WITH ESOPHAGITIS WITHOUT HEMORRHAGE: ICD-10-CM

## 2024-08-16 DIAGNOSIS — C77.3 SECONDARY AND UNSPECIFIED MALIGNANT NEOPLASM OF AXILLA AND UPPER LIMB LYMPH NODES (HCC): ICD-10-CM

## 2024-08-16 DIAGNOSIS — R14.0 ABDOMINAL BLOATING: ICD-10-CM

## 2024-08-16 PROCEDURE — 99214 OFFICE O/P EST MOD 30 MIN: CPT | Performed by: INTERNAL MEDICINE

## 2024-08-16 SDOH — ECONOMIC STABILITY: INCOME INSECURITY: HOW HARD IS IT FOR YOU TO PAY FOR THE VERY BASICS LIKE FOOD, HOUSING, MEDICAL CARE, AND HEATING?: NOT HARD AT ALL

## 2024-08-16 SDOH — ECONOMIC STABILITY: FOOD INSECURITY: WITHIN THE PAST 12 MONTHS, YOU WORRIED THAT YOUR FOOD WOULD RUN OUT BEFORE YOU GOT MONEY TO BUY MORE.: NEVER TRUE

## 2024-08-16 SDOH — ECONOMIC STABILITY: FOOD INSECURITY: WITHIN THE PAST 12 MONTHS, THE FOOD YOU BOUGHT JUST DIDN'T LAST AND YOU DIDN'T HAVE MONEY TO GET MORE.: NEVER TRUE

## 2024-08-16 ASSESSMENT — ANXIETY QUESTIONNAIRES
3. WORRYING TOO MUCH ABOUT DIFFERENT THINGS: NOT AT ALL
7. FEELING AFRAID AS IF SOMETHING AWFUL MIGHT HAPPEN: NOT AT ALL
1. FEELING NERVOUS, ANXIOUS, OR ON EDGE: NOT AT ALL
5. BEING SO RESTLESS THAT IT IS HARD TO SIT STILL: NOT AT ALL
2. NOT BEING ABLE TO STOP OR CONTROL WORRYING: NOT AT ALL
GAD7 TOTAL SCORE: 0
4. TROUBLE RELAXING: NOT AT ALL
IF YOU CHECKED OFF ANY PROBLEMS ON THIS QUESTIONNAIRE, HOW DIFFICULT HAVE THESE PROBLEMS MADE IT FOR YOU TO DO YOUR WORK, TAKE CARE OF THINGS AT HOME, OR GET ALONG WITH OTHER PEOPLE: NOT DIFFICULT AT ALL
6. BECOMING EASILY ANNOYED OR IRRITABLE: NOT AT ALL

## 2024-08-16 ASSESSMENT — PATIENT HEALTH QUESTIONNAIRE - PHQ9
SUM OF ALL RESPONSES TO PHQ QUESTIONS 1-9: 0
SUM OF ALL RESPONSES TO PHQ9 QUESTIONS 1 & 2: 0
SUM OF ALL RESPONSES TO PHQ QUESTIONS 1-9: 0
2. FEELING DOWN, DEPRESSED OR HOPELESS: NOT AT ALL
1. LITTLE INTEREST OR PLEASURE IN DOING THINGS: NOT AT ALL

## 2024-08-16 NOTE — PROGRESS NOTES
Chief Complaint   Patient presents with    Follow-up     gastric     HPI:  Michelle Urena is a 59 y.o. female with significant medical history below presents for follow-up.  Patient has c/o new onset heartburn, abdominal bloating. Denies fever/chills, diarrhea.    Review of Systems  As per hpi    Past Medical History:   Diagnosis Date    Breast cancer (HCC)     Left lumpectomy     Breast cancer in female (HCC)     Cancer (HCC)     Eczema     History of therapeutic radiation     Menopause     S/P lumpectomy of breast     S/P radiation therapy     Ventral hernia 2014     Past Surgical History:   Procedure Laterality Date    BREAST LUMPECTOMY Left 2021    LEFT BREAST LUMPECTOMY WITH ULTRASOUND/LEFT BREAST SENTINEL NODE BIOPSY (1200) performed by Keily Merino MD at [facility] AMBULATORY OR     SECTION      GI      colonscopy    GYN      c section    GYN      fibroid sx    MYOMECTOMY   &     US BREAST BIOPSY W LOC DEVICE 1ST LESION LEFT Left 2021    US BREAST NEEDLE BIOPSY LEFT 2021 [facility] RAD MAMMO     Social History     Socioeconomic History    Marital status:    Tobacco Use    Smoking status: Never    Smokeless tobacco: Never   Substance and Sexual Activity    Alcohol use: No    Drug use: No     Social Determinants of Health     Financial Resource Strain: Low Risk  (2024)    Overall Financial Resource Strain (CARDIA)     Difficulty of Paying Living Expenses: Not hard at all   Food Insecurity: No Food Insecurity (2024)    Hunger Vital Sign     Worried About Running Out of Food in the Last Year: Never true     Ran Out of Food in the Last Year: Never true   Transportation Needs: Unknown (2024)    PRAPARE - Transportation     Lack of Transportation (Non-Medical): No   Housing Stability: Unknown (2024)    Housing Stability Vital Sign     Homeless in the Last Year: No     Family History   Problem Relation Age of Onset    Breast Cancer Sister 60    No Known

## 2024-08-23 DIAGNOSIS — E78.5 DYSLIPIDEMIA (HIGH LDL; LOW HDL): ICD-10-CM

## 2024-09-01 DIAGNOSIS — M81.8 OTHER OSTEOPOROSIS WITHOUT CURRENT PATHOLOGICAL FRACTURE: ICD-10-CM

## 2024-09-01 DIAGNOSIS — E55.9 VITAMIN D DEFICIENCY, UNSPECIFIED: ICD-10-CM

## 2024-09-03 DIAGNOSIS — R14.0 ABDOMINAL BLOATING: ICD-10-CM

## 2024-09-03 DIAGNOSIS — K21.00 GASTROESOPHAGEAL REFLUX DISEASE WITH ESOPHAGITIS WITHOUT HEMORRHAGE: Primary | ICD-10-CM

## 2024-09-03 DIAGNOSIS — A09 DIARRHEA OF INFECTIOUS ORIGIN: ICD-10-CM

## 2024-09-04 DIAGNOSIS — K21.00 GASTROESOPHAGEAL REFLUX DISEASE WITH ESOPHAGITIS WITHOUT HEMORRHAGE: Primary | ICD-10-CM

## 2024-09-04 RX ORDER — OMEPRAZOLE 40 MG/1
40 CAPSULE, DELAYED RELEASE ORAL
Qty: 90 CAPSULE | Refills: 1 | Status: SHIPPED | OUTPATIENT
Start: 2024-09-04

## 2024-09-11 ENCOUNTER — OFFICE VISIT (OUTPATIENT)
Age: 59
End: 2024-09-11
Payer: COMMERCIAL

## 2024-09-11 VITALS
HEART RATE: 75 BPM | WEIGHT: 142 LBS | DIASTOLIC BLOOD PRESSURE: 68 MMHG | SYSTOLIC BLOOD PRESSURE: 102 MMHG | BODY MASS INDEX: 24.24 KG/M2 | HEIGHT: 64 IN

## 2024-09-11 DIAGNOSIS — E55.9 VITAMIN D DEFICIENCY, UNSPECIFIED: ICD-10-CM

## 2024-09-11 DIAGNOSIS — M81.8 OTHER OSTEOPOROSIS WITHOUT CURRENT PATHOLOGICAL FRACTURE: Primary | ICD-10-CM

## 2024-09-11 DIAGNOSIS — T38.6X5A ADVERSE EFFECT OF ANTIGONADOTROPHINS, ANTIESTROGENS, ANTIANDROGENS, NOT ELSEWHERE CLASSIFIED, INITIAL ENCOUNTER: ICD-10-CM

## 2024-09-11 PROCEDURE — 99214 OFFICE O/P EST MOD 30 MIN: CPT | Performed by: INTERNAL MEDICINE

## 2024-09-27 DIAGNOSIS — M81.8 OSTEOPOROSIS DUE TO AROMATASE INHIBITOR: Primary | ICD-10-CM

## 2024-09-27 DIAGNOSIS — T38.6X5A OSTEOPOROSIS DUE TO AROMATASE INHIBITOR: Primary | ICD-10-CM

## 2024-09-27 RX ORDER — ALBUTEROL SULFATE 90 UG/1
4 INHALANT RESPIRATORY (INHALATION) PRN
OUTPATIENT
Start: 2024-09-27

## 2024-09-27 RX ORDER — ONDANSETRON 2 MG/ML
8 INJECTION INTRAMUSCULAR; INTRAVENOUS
OUTPATIENT
Start: 2024-09-27

## 2024-09-27 RX ORDER — SODIUM CHLORIDE 9 MG/ML
INJECTION, SOLUTION INTRAVENOUS CONTINUOUS
OUTPATIENT
Start: 2024-09-27

## 2024-09-27 RX ORDER — EPINEPHRINE 1 MG/ML
0.3 INJECTION, SOLUTION, CONCENTRATE INTRAVENOUS PRN
OUTPATIENT
Start: 2024-09-27

## 2024-09-27 RX ORDER — FAMOTIDINE 10 MG/ML
20 INJECTION, SOLUTION INTRAVENOUS
OUTPATIENT
Start: 2024-09-27

## 2024-09-27 RX ORDER — SODIUM CHLORIDE 9 MG/ML
5-250 INJECTION, SOLUTION INTRAVENOUS PRN
OUTPATIENT
Start: 2024-09-27

## 2024-09-27 RX ORDER — ACETAMINOPHEN 325 MG/1
650 TABLET ORAL
OUTPATIENT
Start: 2024-09-27

## 2024-09-27 RX ORDER — DIPHENHYDRAMINE HYDROCHLORIDE 50 MG/ML
50 INJECTION INTRAMUSCULAR; INTRAVENOUS
OUTPATIENT
Start: 2024-09-27

## 2024-09-27 RX ORDER — ZOLEDRONIC ACID 0.05 MG/ML
5 INJECTION, SOLUTION INTRAVENOUS ONCE
OUTPATIENT
Start: 2024-09-27 | End: 2024-09-27

## 2024-10-04 ENCOUNTER — HOSPITAL ENCOUNTER (OUTPATIENT)
Facility: HOSPITAL | Age: 59
Setting detail: INFUSION SERIES
Discharge: HOME | End: 2024-10-04
Payer: COMMERCIAL

## 2024-10-04 VITALS
HEIGHT: 64 IN | TEMPERATURE: 98 F | SYSTOLIC BLOOD PRESSURE: 116 MMHG | WEIGHT: 142.1 LBS | DIASTOLIC BLOOD PRESSURE: 71 MMHG | RESPIRATION RATE: 16 BRPM | BODY MASS INDEX: 24.26 KG/M2 | HEART RATE: 59 BPM | OXYGEN SATURATION: 100 %

## 2024-10-04 DIAGNOSIS — M81.8 OSTEOPOROSIS DUE TO AROMATASE INHIBITOR: Primary | ICD-10-CM

## 2024-10-04 DIAGNOSIS — T38.6X5A OSTEOPOROSIS DUE TO AROMATASE INHIBITOR: Primary | ICD-10-CM

## 2024-10-04 LAB
ALBUMIN SERPL-MCNC: 3.9 G/DL (ref 3.5–5)
ALBUMIN/GLOB SERPL: 1.2 (ref 1.1–2.2)
ALP SERPL-CCNC: 76 U/L (ref 45–117)
ALT SERPL-CCNC: 31 U/L (ref 12–78)
ANION GAP BLD CALC-SCNC: 5.2 MMOL/L (ref 10–20)
ANION GAP BLD CALC-SCNC: 6.8 MMOL/L (ref 10–20)
ANION GAP BLD CALC-SCNC: 8.6 MMOL/L (ref 10–20)
ANION GAP SERPL CALC-SCNC: 5 MMOL/L (ref 2–12)
AST SERPL-CCNC: 22 U/L (ref 15–37)
BILIRUB SERPL-MCNC: 0.4 MG/DL (ref 0.2–1)
BUN SERPL-MCNC: 19 MG/DL (ref 6–20)
BUN/CREAT SERPL: 22 (ref 12–20)
CA-I BLD-MCNC: 1 MMOL/L (ref 1.15–1.33)
CA-I BLD-MCNC: 1.11 MMOL/L (ref 1.15–1.33)
CA-I BLD-MCNC: 1.13 MMOL/L (ref 1.15–1.33)
CALCIUM SERPL-MCNC: 9.2 MG/DL (ref 8.5–10.1)
CHLORIDE BLD-SCNC: 102 MMOL/L (ref 98–107)
CHLORIDE BLD-SCNC: 104 MMOL/L (ref 98–107)
CHLORIDE BLD-SCNC: 105 MMOL/L (ref 98–107)
CHLORIDE SERPL-SCNC: 105 MMOL/L (ref 97–108)
CO2 BLD-SCNC: 22.4 MMOL/L (ref 21–32)
CO2 BLD-SCNC: 27.2 MMOL/L (ref 21–32)
CO2 BLD-SCNC: 27.8 MMOL/L (ref 21–32)
CO2 SERPL-SCNC: 28 MMOL/L (ref 21–32)
CREAT BLD-MCNC: 0.78 MG/DL (ref 0.6–1.3)
CREAT BLD-MCNC: 0.83 MG/DL (ref 0.6–1.3)
CREAT BLD-MCNC: 0.84 MG/DL (ref 0.6–1.3)
CREAT SERPL-MCNC: 0.87 MG/DL (ref 0.55–1.02)
GLOBULIN SER CALC-MCNC: 3.3 G/DL (ref 2–4)
GLUCOSE BLD-MCNC: 84 MG/DL (ref 74–99)
GLUCOSE BLD-MCNC: 85 MG/DL (ref 74–99)
GLUCOSE BLD-MCNC: 92 MG/DL (ref 74–99)
GLUCOSE SERPL-MCNC: 96 MG/DL (ref 65–100)
POTASSIUM BLD-SCNC: 4.2 MMOL/L (ref 3.5–5.1)
POTASSIUM BLD-SCNC: 4.5 MMOL/L (ref 3.5–5.1)
POTASSIUM BLD-SCNC: 4.9 MMOL/L (ref 3.5–5.1)
POTASSIUM SERPL-SCNC: 4.6 MMOL/L (ref 3.5–5.1)
PROT SERPL-MCNC: 7.2 G/DL (ref 6.4–8.2)
SERVICE CMNT-IMP: ABNORMAL
SODIUM BLD-SCNC: 136 MMOL/L (ref 136–145)
SODIUM BLD-SCNC: 136 MMOL/L (ref 136–145)
SODIUM BLD-SCNC: 137 MMOL/L (ref 136–145)
SODIUM SERPL-SCNC: 138 MMOL/L (ref 136–145)

## 2024-10-04 PROCEDURE — 6360000002 HC RX W HCPCS: Performed by: INTERNAL MEDICINE

## 2024-10-04 PROCEDURE — 80047 BASIC METABLC PNL IONIZED CA: CPT

## 2024-10-04 PROCEDURE — 80053 COMPREHEN METABOLIC PANEL: CPT

## 2024-10-04 PROCEDURE — 36415 COLL VENOUS BLD VENIPUNCTURE: CPT

## 2024-10-04 PROCEDURE — 96374 THER/PROPH/DIAG INJ IV PUSH: CPT

## 2024-10-04 RX ORDER — DIPHENHYDRAMINE HYDROCHLORIDE 50 MG/ML
50 INJECTION INTRAMUSCULAR; INTRAVENOUS
OUTPATIENT
Start: 2025-09-21

## 2024-10-04 RX ORDER — ONDANSETRON 2 MG/ML
8 INJECTION INTRAMUSCULAR; INTRAVENOUS
OUTPATIENT
Start: 2025-09-21

## 2024-10-04 RX ORDER — SODIUM CHLORIDE 9 MG/ML
5-250 INJECTION, SOLUTION INTRAVENOUS PRN
Status: DISCONTINUED | OUTPATIENT
Start: 2024-10-04 | End: 2024-10-05 | Stop reason: HOSPADM

## 2024-10-04 RX ORDER — ZOLEDRONIC ACID 0.05 MG/ML
5 INJECTION, SOLUTION INTRAVENOUS ONCE
OUTPATIENT
Start: 2025-09-21 | End: 2025-09-21

## 2024-10-04 RX ORDER — EPINEPHRINE 1 MG/ML
0.3 INJECTION, SOLUTION INTRAMUSCULAR; SUBCUTANEOUS PRN
OUTPATIENT
Start: 2025-09-21

## 2024-10-04 RX ORDER — ZOLEDRONIC ACID 0.05 MG/ML
5 INJECTION, SOLUTION INTRAVENOUS ONCE
Status: COMPLETED | OUTPATIENT
Start: 2024-10-04 | End: 2024-10-04

## 2024-10-04 RX ORDER — SODIUM CHLORIDE 9 MG/ML
INJECTION, SOLUTION INTRAVENOUS CONTINUOUS
OUTPATIENT
Start: 2025-09-21

## 2024-10-04 RX ORDER — ALBUTEROL SULFATE 90 UG/1
4 INHALANT RESPIRATORY (INHALATION) PRN
OUTPATIENT
Start: 2025-09-21

## 2024-10-04 RX ORDER — ACETAMINOPHEN 325 MG/1
650 TABLET ORAL
OUTPATIENT
Start: 2025-09-21

## 2024-10-04 RX ORDER — SODIUM CHLORIDE 9 MG/ML
5-250 INJECTION, SOLUTION INTRAVENOUS PRN
OUTPATIENT
Start: 2025-09-21

## 2024-10-04 RX ADMIN — ZOLEDRONIC ACID 5 MG: 0.05 INJECTION, SOLUTION INTRAVENOUS at 16:15

## 2024-10-23 ENCOUNTER — HOSPITAL ENCOUNTER (OUTPATIENT)
Facility: HOSPITAL | Age: 59
Discharge: HOME | End: 2024-10-26
Attending: INTERNAL MEDICINE
Payer: COMMERCIAL

## 2024-10-23 DIAGNOSIS — C50.412 MALIGNANT NEOPLASM OF UPPER-OUTER QUADRANT OF LEFT FEMALE BREAST, UNSPECIFIED ESTROGEN RECEPTOR STATUS (HCC): ICD-10-CM

## 2024-10-23 PROCEDURE — C8908 MRI W/O FOL W/CONT, BREAST,: HCPCS

## 2024-10-23 PROCEDURE — A9579 GAD-BASE MR CONTRAST NOS,1ML: HCPCS | Performed by: INTERNAL MEDICINE

## 2024-10-23 PROCEDURE — 6360000004 HC RX CONTRAST MEDICATION: Performed by: INTERNAL MEDICINE

## 2024-10-23 PROCEDURE — 2580000003 HC RX 258: Performed by: INTERNAL MEDICINE

## 2024-10-23 RX ORDER — 0.9 % SODIUM CHLORIDE 0.9 %
100 INTRAVENOUS SOLUTION INTRAVENOUS ONCE
Status: COMPLETED | OUTPATIENT
Start: 2024-10-23 | End: 2024-10-23

## 2024-10-23 RX ADMIN — GADOTERIDOL 12 ML: 279.3 INJECTION, SOLUTION INTRAVENOUS at 10:22

## 2024-10-23 RX ADMIN — SODIUM CHLORIDE 100 ML: 9 INJECTION, SOLUTION INTRAVENOUS at 10:23

## 2024-11-01 ENCOUNTER — TELEPHONE (OUTPATIENT)
Facility: HOSPITAL | Age: 59
End: 2024-11-01

## 2024-11-01 NOTE — TELEPHONE ENCOUNTER
[facility]  Breast Navigator Encounter    Name:    Michelle Urena  Age:    59 y.o.  Diagnosis:   History of LEFT breast cancer, new skin lesion    Spoke to patient this morning about a new skin lesion that she has near the surgical incision.  Noticed this first at the end of the summer and thought it was just a pimple, but this has changed.  Got an appt with Dr. Merino for 11/20, but is asking if there is anything sooner.    I got her appt moved up to 11/6 at 2:15 pm.  I contacted her to let her know about this appointment.  I told her that Dr. Merino may do a skin biopsy at that time.     Called back wanting to make sure that she can work if she has a skin biopsy.  I told her that she should be fine.     She was very appreciative.                               Nasrin Cardozo RN, BSN, [facility]  Oncology Breast Navigator     [address]  [address]  W: [phone]  F: [phone]  [email or web address]  Good Help to Those in Need®

## 2024-11-06 ENCOUNTER — OFFICE VISIT (OUTPATIENT)
Age: 59
End: 2024-11-06
Payer: COMMERCIAL

## 2024-11-06 VITALS — WEIGHT: 142 LBS | HEIGHT: 64 IN | BODY MASS INDEX: 24.24 KG/M2

## 2024-11-06 DIAGNOSIS — Z17.0 MALIGNANT NEOPLASM OF UPPER-OUTER QUADRANT OF LEFT BREAST IN FEMALE, ESTROGEN RECEPTOR POSITIVE (HCC): Primary | ICD-10-CM

## 2024-11-06 DIAGNOSIS — C50.412 MALIGNANT NEOPLASM OF UPPER-OUTER QUADRANT OF LEFT BREAST IN FEMALE, ESTROGEN RECEPTOR POSITIVE (HCC): Primary | ICD-10-CM

## 2024-11-06 PROCEDURE — 99214 OFFICE O/P EST MOD 30 MIN: CPT | Performed by: SURGERY

## 2024-11-06 RX ORDER — DOXYCYCLINE HYCLATE 100 MG
100 TABLET ORAL 2 TIMES DAILY
Qty: 14 TABLET | Refills: 0 | Status: SHIPPED | OUTPATIENT
Start: 2024-11-06 | End: 2024-11-13

## 2024-11-06 NOTE — PROGRESS NOTES
HISTORY OF PRESENT ILLNESS  Michelle Urena is a 59 y.o. female     HPI ESTABLISHED Patient here for LEFT breast concerns. Noticed a lump on the LEFT breast around summer and has not improved. The area is tender to touch when pressure is applied. Denies other concerns in this area.       Breast Cancer:  6/21/21 - LEFT breast bx:  LEFT breast IDC, gr2,  ER 99%, [place] 80%, HER2 neg, ki-67 30%  7/13/21 - LEFT breast lumpectomy, SNbx   T2 N1   Score 23 / Oncotype  Dr Stokes - AI, letrozole  Kirstin- XRT  BRACA2-VUS    Breast imaging-   MRI Result (most recent):  MRI BREAST BILATERAL W WO CONTRAST 10/23/2024    Narrative  EXAM:  MRI BREAST BILATERAL W WO CONTRAST    INDICATION: Left breast carcinoma treated with left lumpectomy in 2021. Evaluate  for local recurrence or new neoplasm.    COMPARISON: MRI breast on 11/8/2023 and 10/3/2022. Mammography on 6/12/2024 and  6/16/2023.    EXAM: MRI of right and left breast without and with IV contrast Gadolinium .    TECHNIQUE: MRI breast without and with IV contrast. Bilateral breast MRI was  performed using a dedicated breast coil without compression with the patient in  the prone position. Precontrast T1-weighted images with fat suppression were  obtained followed by bolus injection of 12 mL of ProHance performed on a 3 Vashti  magnet. Postcontrast dynamic and high-resolution images were acquired. Axial T2  fat-saturated imaging was also performed. The images were analyzed using CAD  analysis, enhancement curves, digital subtraction, and 2 and 3 dimensional  reconstructions.    FINDINGS:    Background parenchymal enhancement: Minimal.    Lymph nodes: No lymphadenopathy.    Right breast: There is no suspicious focus of morphology or temporal  enhancement. Normal skin and nipple.    Left breast: There is no suspicious focus of morphology or temporal enhancement.  Postsurgical scarring and skin changes are stable. Normal skin and nipple.    Miscellaneous: None.    Impression  No

## 2024-11-21 RX ORDER — CEPHALEXIN 500 MG/1
500 CAPSULE ORAL 4 TIMES DAILY
Qty: 28 CAPSULE | Refills: 0 | Status: SHIPPED | OUTPATIENT
Start: 2024-11-21 | End: 2024-11-28

## 2024-12-18 ENCOUNTER — TRANSCRIBE ORDERS (OUTPATIENT)
Facility: HOSPITAL | Age: 59
End: 2024-12-18

## 2024-12-18 DIAGNOSIS — C50.412 MALIGNANT NEOPLASM OF UPPER-OUTER QUADRANT OF LEFT FEMALE BREAST, UNSPECIFIED ESTROGEN RECEPTOR STATUS (HCC): Primary | ICD-10-CM

## 2024-12-20 DIAGNOSIS — K21.00 GASTROESOPHAGEAL REFLUX DISEASE WITH ESOPHAGITIS WITHOUT HEMORRHAGE: ICD-10-CM

## 2024-12-20 DIAGNOSIS — R14.0 ABDOMINAL BLOATING: ICD-10-CM

## 2024-12-22 LAB
H PYLORI AG STL QL IA: NEGATIVE
SPECIMEN SOURCE: NORMAL

## 2025-02-12 ENCOUNTER — OFFICE VISIT (OUTPATIENT)
Age: 60
End: 2025-02-12
Payer: COMMERCIAL

## 2025-02-12 VITALS — WEIGHT: 142 LBS | BODY MASS INDEX: 24.24 KG/M2 | HEIGHT: 64 IN

## 2025-02-12 DIAGNOSIS — C50.412 MALIGNANT NEOPLASM OF UPPER-OUTER QUADRANT OF LEFT BREAST IN FEMALE, ESTROGEN RECEPTOR POSITIVE (HCC): Primary | ICD-10-CM

## 2025-02-12 DIAGNOSIS — Z17.0 MALIGNANT NEOPLASM OF UPPER-OUTER QUADRANT OF LEFT BREAST IN FEMALE, ESTROGEN RECEPTOR POSITIVE (HCC): Primary | ICD-10-CM

## 2025-02-12 DIAGNOSIS — R21 RASH: ICD-10-CM

## 2025-02-12 PROCEDURE — 99213 OFFICE O/P EST LOW 20 MIN: CPT | Performed by: SURGERY

## 2025-02-12 NOTE — PROGRESS NOTES
HISTORY OF PRESENT ILLNESS  Michelle Urena is a 60 y.o. female     HPI ESTABLISHED patient here for 6 Month f/u visit pertaining to  LEFT breast cancer. Pt has itching of the LEFT breast. Denies any pain         Breast Cancer:  6/21/21 - LEFT breast bx:  LEFT breast IDC, gr2,  ER 99%, [place] 80%, HER2 neg, ki-67 30%  7/13/21 - LEFT breast lumpectomy, SNbx   T2 N1   Score 23 / Oncotype  Dr Stokes - AI, letrozole  Kirstin- XRT  BRACA2-VUS           Review of Systems   All other systems reviewed and are negative.        Physical Exam  Vitals and nursing note reviewed.   Chest:   Breasts:     Right: No swelling, bleeding, inverted nipple, mass, nipple discharge, skin change or tenderness.      Left: No swelling, bleeding, inverted nipple, mass, nipple discharge, skin change or tenderness.      Comments: 1 cm area rash left breast 6:00 inferior to areola  Lymphadenopathy:      Upper Body:      Right upper body: No axillary adenopathy.      Left upper body: No axillary adenopathy.            ASSESSMENT and PLAN   Diagnosis Orders   1. Malignant neoplasm of upper-outer quadrant of left breast in female, estrogen receptor positive (HCC)        2. Rash          - cortisone trial 1 month  If no improvement punch biopsy   20 minutes was spent with patient on counseling and coordination of care.

## 2025-02-18 DIAGNOSIS — Z17.0 MALIGNANT NEOPLASM OF UPPER-OUTER QUADRANT OF LEFT BREAST IN FEMALE, ESTROGEN RECEPTOR POSITIVE (HCC): Primary | ICD-10-CM

## 2025-02-18 DIAGNOSIS — C50.412 MALIGNANT NEOPLASM OF UPPER-OUTER QUADRANT OF LEFT BREAST IN FEMALE, ESTROGEN RECEPTOR POSITIVE (HCC): Primary | ICD-10-CM

## 2025-03-03 DIAGNOSIS — T38.6X5A ADVERSE EFFECT OF ANTIGONADOTROPHINS, ANTIESTROGENS, ANTIANDROGENS, NOT ELSEWHERE CLASSIFIED, INITIAL ENCOUNTER: ICD-10-CM

## 2025-03-03 DIAGNOSIS — E55.9 VITAMIN D DEFICIENCY, UNSPECIFIED: ICD-10-CM

## 2025-03-03 DIAGNOSIS — M81.8 OTHER OSTEOPOROSIS WITHOUT CURRENT PATHOLOGICAL FRACTURE: Primary | ICD-10-CM

## 2025-03-03 RX ORDER — CLOTRIMAZOLE AND BETAMETHASONE DIPROPIONATE 10; .64 MG/G; MG/G
CREAM TOPICAL
Qty: 45 G | Refills: 1 | Status: SHIPPED | OUTPATIENT
Start: 2025-03-03

## 2025-03-19 ENCOUNTER — OFFICE VISIT (OUTPATIENT)
Age: 60
End: 2025-03-19
Payer: COMMERCIAL

## 2025-03-19 VITALS — BODY MASS INDEX: 24.24 KG/M2 | WEIGHT: 142 LBS | HEIGHT: 64 IN

## 2025-03-19 DIAGNOSIS — C50.412 MALIGNANT NEOPLASM OF UPPER-OUTER QUADRANT OF LEFT BREAST IN FEMALE, ESTROGEN RECEPTOR POSITIVE: Primary | ICD-10-CM

## 2025-03-19 DIAGNOSIS — Z17.0 MALIGNANT NEOPLASM OF UPPER-OUTER QUADRANT OF LEFT BREAST IN FEMALE, ESTROGEN RECEPTOR POSITIVE: Primary | ICD-10-CM

## 2025-03-19 PROCEDURE — 99213 OFFICE O/P EST LOW 20 MIN: CPT | Performed by: SURGERY

## 2025-03-19 NOTE — PROGRESS NOTES
HISTORY OF PRESENT ILLNESS  Michelle Urena is a 60 y.o. female     HPI ESTABLISHED Patient here for follow up Rash. Given cortisone ointment  to try. Feels like the area has  improved but not resolved with the ointment.       Breast Cancer:  6/21/21 - LEFT breast bx:  LEFT breast IDC, gr2,  ER 99%, NE 80%, HER2 neg, ki-67 30%  7/13/21 - LEFT breast lumpectomy, SNbx   T2 N1   Score 23 / Oncotype  Dr Stokes - AI, letrozole  Kirstin- XRT  BRACA2-VUS         Review of Systems   All other systems reviewed and are negative.        Physical Exam  Vitals and nursing note reviewed.   Chest:      Comments: Left breast rash inferior breast resolved             ASSESSMENT and PLAN   Diagnosis Orders   1. Malignant neoplasm of upper-outer quadrant of left breast in female, estrogen receptor positive (HCC)          - will observe  Continue screening imaging  Rtc in 6 months  20 minutes was spent with patient on counseling and coordination of care.

## 2025-03-26 DIAGNOSIS — K21.00 GASTROESOPHAGEAL REFLUX DISEASE WITH ESOPHAGITIS WITHOUT HEMORRHAGE: Primary | ICD-10-CM

## 2025-03-26 RX ORDER — OMEPRAZOLE 20 MG/1
20 CAPSULE, DELAYED RELEASE ORAL 2 TIMES DAILY
Qty: 60 CAPSULE | Refills: 1 | Status: SHIPPED | OUTPATIENT
Start: 2025-03-26

## 2025-04-30 ENCOUNTER — HOSPITAL ENCOUNTER (OUTPATIENT)
Facility: HOSPITAL | Age: 60
Discharge: HOME | End: 2025-05-03
Attending: INTERNAL MEDICINE
Payer: COMMERCIAL

## 2025-04-30 VITALS — WEIGHT: 142 LBS | HEIGHT: 64 IN | BODY MASS INDEX: 24.24 KG/M2

## 2025-04-30 DIAGNOSIS — Z17.0 MALIGNANT NEOPLASM OF UPPER-OUTER QUADRANT OF LEFT BREAST IN FEMALE, ESTROGEN RECEPTOR POSITIVE (HCC): ICD-10-CM

## 2025-04-30 DIAGNOSIS — C50.412 MALIGNANT NEOPLASM OF UPPER-OUTER QUADRANT OF LEFT BREAST IN FEMALE, ESTROGEN RECEPTOR POSITIVE (HCC): ICD-10-CM

## 2025-04-30 DIAGNOSIS — N63.41 SUBAREOLAR LUMP OF RIGHT BREAST: ICD-10-CM

## 2025-04-30 PROCEDURE — 76642 ULTRASOUND BREAST LIMITED: CPT

## 2025-04-30 PROCEDURE — G0279 TOMOSYNTHESIS, MAMMO: HCPCS

## 2025-05-09 DIAGNOSIS — M54.9 CHRONIC MID BACK PAIN: Primary | ICD-10-CM

## 2025-05-09 DIAGNOSIS — G89.29 CHRONIC MID BACK PAIN: Primary | ICD-10-CM

## 2025-05-21 DIAGNOSIS — K21.00 GASTROESOPHAGEAL REFLUX DISEASE WITH ESOPHAGITIS WITHOUT HEMORRHAGE: ICD-10-CM

## 2025-05-21 DIAGNOSIS — J20.8 ACUTE BRONCHITIS DUE TO OTHER SPECIFIED ORGANISMS: ICD-10-CM

## 2025-05-21 RX ORDER — OMEPRAZOLE 20 MG/1
20 CAPSULE, DELAYED RELEASE ORAL 2 TIMES DAILY
Qty: 60 CAPSULE | Refills: 1 | Status: SHIPPED | OUTPATIENT
Start: 2025-05-21

## 2025-05-21 RX ORDER — AMOXICILLIN 500 MG/1
500 CAPSULE ORAL 3 TIMES DAILY
Qty: 30 CAPSULE | Refills: 0 | Status: SHIPPED | OUTPATIENT
Start: 2025-05-21 | End: 2025-05-31

## 2025-05-23 ENCOUNTER — HOSPITAL ENCOUNTER (OUTPATIENT)
Facility: HOSPITAL | Age: 60
Discharge: HOME | End: 2025-05-26
Payer: COMMERCIAL

## 2025-05-23 DIAGNOSIS — G89.29 CHRONIC MID BACK PAIN: ICD-10-CM

## 2025-05-23 DIAGNOSIS — M54.9 CHRONIC MID BACK PAIN: ICD-10-CM

## 2025-05-23 PROCEDURE — 72100 X-RAY EXAM L-S SPINE 2/3 VWS: CPT

## 2025-05-23 PROCEDURE — 72072 X-RAY EXAM THORAC SPINE 3VWS: CPT

## 2025-05-28 ENCOUNTER — RESULTS FOLLOW-UP (OUTPATIENT)
Age: 60
End: 2025-05-28

## 2025-06-06 ENCOUNTER — TRANSCRIBE ORDERS (OUTPATIENT)
Facility: HOSPITAL | Age: 60
End: 2025-06-06

## 2025-06-06 DIAGNOSIS — R10.84 ABDOMINAL PAIN, GENERALIZED: Primary | ICD-10-CM

## 2025-06-09 ENCOUNTER — HOSPITAL ENCOUNTER (OUTPATIENT)
Facility: HOSPITAL | Age: 60
Discharge: HOME | End: 2025-06-12
Attending: INTERNAL MEDICINE
Payer: COMMERCIAL

## 2025-06-09 VITALS — WEIGHT: 138 LBS | BODY MASS INDEX: 23.69 KG/M2

## 2025-06-09 DIAGNOSIS — S22.080S WEDGE COMPRESSION FRACTURE OF T11-T12 VERTEBRA, SEQUELA: ICD-10-CM

## 2025-06-09 DIAGNOSIS — M85.80 OTHER SPECIFIED DISORDERS OF BONE DENSITY AND STRUCTURE, UNSPECIFIED SITE: ICD-10-CM

## 2025-06-09 DIAGNOSIS — C50.412 MALIGNANT NEOPLASM OF UPPER-OUTER QUADRANT OF LEFT FEMALE BREAST, UNSPECIFIED ESTROGEN RECEPTOR STATUS (HCC): ICD-10-CM

## 2025-06-09 DIAGNOSIS — M54.50 LOW BACK PAIN, UNSPECIFIED BACK PAIN LATERALITY, UNSPECIFIED CHRONICITY, UNSPECIFIED WHETHER SCIATICA PRESENT: ICD-10-CM

## 2025-06-09 DIAGNOSIS — R10.84 GENERALIZED ABDOMINAL PAIN: ICD-10-CM

## 2025-06-09 DIAGNOSIS — R42 DIZZINESS AND GIDDINESS: ICD-10-CM

## 2025-06-09 PROCEDURE — A9579 GAD-BASE MR CONTRAST NOS,1ML: HCPCS | Performed by: RADIOLOGY

## 2025-06-09 PROCEDURE — 6360000004 HC RX CONTRAST MEDICATION: Performed by: RADIOLOGY

## 2025-06-09 PROCEDURE — 72157 MRI CHEST SPINE W/O & W/DYE: CPT

## 2025-06-09 RX ORDER — GADOTERIDOL 279.3 MG/ML
12 INJECTION INTRAVENOUS
Status: COMPLETED | OUTPATIENT
Start: 2025-06-09 | End: 2025-06-09

## 2025-06-09 RX ADMIN — GADOTERIDOL 12 ML: 279.3 INJECTION, SOLUTION INTRAVENOUS at 11:31

## 2025-06-13 DIAGNOSIS — J20.8 ACUTE BRONCHITIS DUE TO OTHER SPECIFIED ORGANISMS: ICD-10-CM

## 2025-06-13 RX ORDER — AMOXICILLIN 500 MG/1
500 CAPSULE ORAL 3 TIMES DAILY
Qty: 30 CAPSULE | Refills: 0 | Status: SHIPPED | OUTPATIENT
Start: 2025-06-13 | End: 2025-06-23

## 2025-07-09 LAB
CHOLEST SERPL-MCNC: 198 MG/DL
GLUCOSE SERPL-MCNC: 91 MG/DL (ref 65–100)
HDLC SERPL-MCNC: 81 MG/DL
LDLC SERPL CALC-MCNC: 103.2 MG/DL (ref 0–100)
TRIGL SERPL-MCNC: 69 MG/DL

## 2025-08-25 DIAGNOSIS — L03.119 CELLULITIS OF LOWER EXTREMITY, UNSPECIFIED LATERALITY: Primary | ICD-10-CM

## 2025-08-25 RX ORDER — CEPHALEXIN 500 MG/1
500 CAPSULE ORAL 2 TIMES DAILY
Qty: 20 CAPSULE | Refills: 0 | Status: SHIPPED | OUTPATIENT
Start: 2025-08-25 | End: 2025-09-04

## (undated) DEVICE — CURVED, SMALL JAW, OPEN SEALER/DIVIDER: Brand: LIGASURE

## (undated) DEVICE — YANKAUER,TAPERED BULBOUS TIP,W/O VENT: Brand: MEDLINE

## (undated) DEVICE — PENCIL SMK EVAC L10FT DIA95MM TBNG NONSTICK W ADPT TO 22MM

## (undated) DEVICE — DERMABOND SKIN ADH 0.7ML -- DERMABOND ADVANCED 12/BX

## (undated) DEVICE — WRAP SURG W1.31XL1.34M CARD FOR PT 165-172CM THERMOWRP

## (undated) DEVICE — COVER US PRB W12XL244CM FLD IORT STR TIP

## (undated) DEVICE — DRAPE,CHEST,FENES,15X10,STERIL: Brand: MEDLINE

## (undated) DEVICE — GARMENT,MEDLINE,DVT,INT,CALF,MED, GEN2: Brand: MEDLINE

## (undated) DEVICE — SYR 10ML LUER LOK 1/5ML GRAD --

## (undated) DEVICE — PACK,BASIC,SIRUS,V: Brand: MEDLINE

## (undated) DEVICE — 1010 S-DRAPE TOWEL DRAPE 10/BX: Brand: STERI-DRAPE™

## (undated) DEVICE — INSULATED BLADE ELECTRODE: Brand: EDGE

## (undated) DEVICE — TUBING, SUCTION, 1/4" X 12', STRAIGHT: Brand: MEDLINE

## (undated) DEVICE — SUT SLK 2-0SH 30IN BLK --

## (undated) DEVICE — TOWEL SURG W17XL27IN STD BLU COT NONFENESTRATED PREWASHED

## (undated) DEVICE — STERILE POLYISOPRENE POWDER-FREE SURGICAL GLOVES WITH EMOLLIENT COATING: Brand: PROTEXIS

## (undated) DEVICE — PREP SKN CHLRAPRP APL 26ML STR --

## (undated) DEVICE — DRAPE,REIN 53X77,STERILE: Brand: MEDLINE

## (undated) DEVICE — SOLUTION IRRIG 1000ML 0.9% SOD CHL USP POUR PLAS BTL

## (undated) DEVICE — SPONGE GZ W4XL4IN COT 12 PLY TYP VII WVN C FLD DSGN

## (undated) DEVICE — SPONGE LAP 18X18IN STRL -- 5/PK

## (undated) DEVICE — 3M™ TEGADERM™ TRANSPARENT FILM DRESSING FRAME STYLE, 1626W, 4 IN X 4-3/4 IN (10 CM X 12 CM), 50/CT 4CT/CASE: Brand: 3M™ TEGADERM™

## (undated) DEVICE — REM POLYHESIVE ADULT PATIENT RETURN ELECTRODE: Brand: VALLEYLAB

## (undated) DEVICE — NEEDLE HYPO 25GA L1.5IN BVL ORIENTED ECLIPSE

## (undated) DEVICE — HANDLE LT SNAP ON ULT DURABLE LENS FOR TRUMPF ALC DISPOSABLE

## (undated) DEVICE — SUTURE MCRYL SZ 4-0 L27IN ABSRB UD L19MM PS-2 1/2 CIR PRIM Y426H

## (undated) DEVICE — BASIN ST MAJOR-NO CAUTERY: Brand: MEDLINE INDUSTRIES, INC.